# Patient Record
Sex: FEMALE | Race: WHITE | NOT HISPANIC OR LATINO | Employment: UNEMPLOYED | URBAN - METROPOLITAN AREA
[De-identification: names, ages, dates, MRNs, and addresses within clinical notes are randomized per-mention and may not be internally consistent; named-entity substitution may affect disease eponyms.]

---

## 2017-01-24 ENCOUNTER — OFFICE VISIT (OUTPATIENT)
Dept: FAMILY MEDICINE | Facility: OTHER | Age: 27
End: 2017-01-24
Payer: COMMERCIAL

## 2017-01-24 VITALS
TEMPERATURE: 98.2 F | BODY MASS INDEX: 21.09 KG/M2 | DIASTOLIC BLOOD PRESSURE: 72 MMHG | RESPIRATION RATE: 12 BRPM | WEIGHT: 119 LBS | SYSTOLIC BLOOD PRESSURE: 112 MMHG | HEIGHT: 63 IN | HEART RATE: 60 BPM

## 2017-01-24 DIAGNOSIS — N30.01 ACUTE CYSTITIS WITH HEMATURIA: Primary | ICD-10-CM

## 2017-01-24 LAB
ALBUMIN UR-MCNC: NEGATIVE MG/DL
APPEARANCE UR: CLEAR
BACTERIA #/AREA URNS HPF: ABNORMAL /HPF
BILIRUB UR QL STRIP: NEGATIVE
COLOR UR AUTO: YELLOW
GLUCOSE UR STRIP-MCNC: NEGATIVE MG/DL
HGB UR QL STRIP: ABNORMAL
KETONES UR STRIP-MCNC: NEGATIVE MG/DL
LEUKOCYTE ESTERASE UR QL STRIP: NEGATIVE
MUCOUS THREADS #/AREA URNS LPF: PRESENT /LPF
NITRATE UR QL: NEGATIVE
NON-SQ EPI CELLS #/AREA URNS LPF: ABNORMAL /LPF
PH UR STRIP: 5.5 PH (ref 5–7)
RBC #/AREA URNS AUTO: ABNORMAL /HPF (ref 0–2)
SP GR UR STRIP: 1.02 (ref 1–1.03)
URN SPEC COLLECT METH UR: ABNORMAL
UROBILINOGEN UR STRIP-ACNC: 0.2 EU/DL (ref 0.2–1)
WBC #/AREA URNS AUTO: ABNORMAL /HPF (ref 0–2)

## 2017-01-24 PROCEDURE — 81001 URINALYSIS AUTO W/SCOPE: CPT | Performed by: NURSE PRACTITIONER

## 2017-01-24 PROCEDURE — 99213 OFFICE O/P EST LOW 20 MIN: CPT | Performed by: NURSE PRACTITIONER

## 2017-01-24 RX ORDER — NITROFURANTOIN 25; 75 MG/1; MG/1
100 CAPSULE ORAL 2 TIMES DAILY
Qty: 14 CAPSULE | Refills: 0 | Status: SHIPPED | OUTPATIENT
Start: 2017-01-24 | End: 2018-02-28

## 2017-01-24 NOTE — MR AVS SNAPSHOT
"              After Visit Summary   2017    Lizzie Oconnell    MRN: [id number]           Patient Information     Date Of Birth          1990        Visit Information        Provider Department      2017 2:40 PM Belle Correia APRN CNP [facility]        Today's Diagnoses     Burning with urination    -  1     Acute cystitis with hematuria            Follow-ups after your visit        Who to contact     If you have questions or need follow up information about today's clinic visit or your schedule please contact [facility] directly at [phone].  Normal or non-critical lab and imaging results will be communicated to you by Etubicshart, letter or phone within 4 business days after the clinic has received the results. If you do not hear from us within 7 days, please contact the clinic through Etubicshart or phone. If you have a critical or abnormal lab result, we will notify you by phone as soon as possible.  Submit refill requests through PolyRemedy or call your pharmacy and they will forward the refill request to us. Please allow 3 business days for your refill to be completed.          Additional Information About Your Visit        MyChart Information     PolyRemedy lets you send messages to your doctor, view your test results, renew your prescriptions, schedule appointments and more. To sign up, go to [email or web address] . Click on \"Log in\" on the left side of the screen, which will take you to the Welcome page. Then click on \"Sign up Now\" on the right side of the page.     You will be asked to enter the access code listed below, as well as some personal information. Please follow the directions to create your username and password.     Your access code is: [email or web address]  Expires: 2017  2:57 PM     Your access code will  in 90 days. If you need help or a new code, please call your [facility] or [phone].        Care EveryWhere ID     This is your " "Care EveryWhere ID. This could be used by other organizations to access your [facility] medical records  [id number]        Your Vitals Were     Pulse Temperature Respirations Height BMI (Body Mass Index) Last Period    60 98.2  F (36.8  C) (Temporal) 12 5' 2.6\" (1.59 m) 21.35 kg/m2 01/18/2017    Breastfeeding?                   No            Blood Pressure from Last 3 Encounters:   01/24/17 112/72   03/14/15 110/72   03/13/15 111/75    Weight from Last 3 Encounters:   01/24/17 119 lb (53.978 kg)   03/14/15 111 lb 4.8 oz (50.485 kg)   03/13/15 109 lb (49.442 kg)              We Performed the Following     UA with Microscopic reflex to Culture          Today's Medication Changes          These changes are accurate as of: 1/24/17  2:57 PM.  If you have any questions, ask your nurse or doctor.               Start taking these medicines.        Dose/Directions    nitrofurantoin (macrocrystal-monohydrate) 100 MG capsule   Commonly known as:  MACROBID   Used for:  Acute cystitis with hematuria   Started by:  Belle Correia APRN CNP        Dose:  100 mg   Take 1 capsule (100 mg) by mouth 2 times daily   Quantity:  14 capsule   Refills:  0            Where to get your medicines      Some of these will need a paper prescription and others can be bought over the counter.  Ask your nurse if you have questions.     Bring a paper prescription for each of these medications    - nitrofurantoin (macrocrystal-monohydrate) 100 MG capsule             Primary Care Provider    None       No address on file        Thank you!     Thank you for choosing [facility]  for your care. Our goal is always to provide you with excellent care. Hearing back from our patients is one way we can continue to improve our services. Please take a few minutes to complete the written survey that you may receive in the mail after your visit with us. Thank you!             Your Updated Medication List - Protect others around you: " Learn how to safely use, store and throw away your medicines at www.disposemymeds.org.          This list is accurate as of: 1/24/17  2:57 PM.  Always use your most recent med list.                   Brand Name Dispense Instructions for use    AZO TABS PO          nitrofurantoin (macrocrystal-monohydrate) 100 MG capsule    MACROBID    14 capsule    Take 1 capsule (100 mg) by mouth 2 times daily

## 2017-01-24 NOTE — PROGRESS NOTES
Quick Note:    Results discussed with patient in clinic. States understanding of these results.    Belle Correia CNP  ______

## 2017-01-24 NOTE — PROGRESS NOTES
"  SUBJECTIVE:                                                    Lizzie Oconnell is a 27 year old female who presents to clinic today for the following health issues:    HPI    URINARY TRACT SYMPTOMS   Onset: 5 days   Description:   Painful urination (Dysuria): YES  Blood in urine (Hematuria): no   Delay in urine (Hesitency): YES  Intensity: moderate  Progression of Symptoms:  same  Accompanying Signs & Symptoms:  Fever/chills: no   Flank pain YES  Nausea and vomiting: no   Any vaginal symptoms: none  Abdominal/Pelvic Pain: YES History:   History of frequent UTI's: YES  History of kidney stones: no   Sexually Active: YES  Possibility of pregnancy: No  Precipitating factors:          Therapies Tried and outcome: AZO and Pyridium         Problem list and histories reviewed & adjusted, as indicated.  Additional history: as documented        Labs reviewed in EPIC  Problem list, Medication list, Allergies, and Medical/Social/Surgical histories reviewed in [facility] and updated as appropriate.    ROS:  Constitutional, HEENT, cardiovascular, pulmonary, GI, , musculoskeletal, neuro, skin, endocrine and psych systems are negative, except as otherwise noted.    OBJECTIVE:                                                    /72 mmHg  Pulse 60  Temp(Src) 98.2  F (36.8  C) (Temporal)  Resp 12  Ht 5' 2.6\" (1.59 m)  Wt 119 lb (53.978 kg)  BMI 21.35 kg/m2  LMP 01/18/2017  Breastfeeding? No  Body mass index is 21.35 kg/(m^2).  GENERAL: healthy, alert and no distress  EYES: Eyes grossly normal to inspection, PERRL and conjunctivae and sclerae normal  NECK: no adenopathy, no asymmetry, masses, or scars and thyroid normal to palpation  RESP: lungs clear to auscultation - no rales, rhonchi or wheezes  CV: regular rate and rhythm, normal S1 S2, no S3 or S4, no murmur, click or rub, no peripheral edema and peripheral pulses strong  ABDOMEN: soft, nontender, no hepatosplenomegaly, no masses and bowel sounds normal  MS: no gross " musculoskeletal defects noted, no edema  BACK: no CVA tenderness, no paralumbar tenderness    Diagnostic Test Results:  Results for orders placed or performed in visit on 01/24/17 (from the past 24 hour(s))   UA with Microscopic reflex to Culture   Result Value Ref Range    Color Urine Yellow     Appearance Urine Clear     Glucose Urine Negative NEG mg/dL    Bilirubin Urine Negative NEG    Ketones Urine Negative NEG mg/dL    Specific Gravity Urine 1.020 1.003 - 1.035    pH Urine 5.5 5.0 - 7.0 pH    Protein Albumin Urine Negative NEG mg/dL    Urobilinogen Urine 0.2 0.2 - 1.0 EU/dL    Nitrite Urine Negative NEG    Blood Urine Trace (A) NEG    Leukocyte Esterase Urine Negative NEG    Source Unspecified Urine     WBC Urine O - 2 0 - 2 /HPF    RBC Urine O - 2 0 - 2 /HPF    Squamous Epithelial /LPF Urine Few FEW /LPF    Bacteria Urine Few (A) NEG /HPF    Mucous Urine Present (A) NEG /LPF        ASSESSMENT/PLAN:                                                      1. Burning with urination  Negative urine for bacterial infection.  - UA with Microscopic reflex to Culture    2. Acute cystitis with hematuria  Printed script for antibiotic given to patient she will start if symptoms worsen or do not improve with increased fluids over the next few days.   - nitrofurantoin, macrocrystal-monohydrate, (MACROBID) 100 MG capsule; Take 1 capsule (100 mg) by mouth 2 times daily  Dispense: 14 capsule; Refill: 0    See Patient Instructions    [place] Alcantara [facility]

## 2017-01-24 NOTE — NURSING NOTE
"Chief Complaint   Patient presents with     UTI       Initial /72 mmHg  Pulse 60  Temp(Src) 98.2  F (36.8  C) (Temporal)  Resp 12  Ht 5' 2.6\" (1.59 m)  Wt 119 lb (53.978 kg)  BMI 21.35 kg/m2  LMP 01/18/2017  Breastfeeding? No Estimated body mass index is 21.35 kg/(m^2) as calculated from the following:    Height as of this encounter: 5' 2.6\" (1.59 m).    Weight as of this encounter: 119 lb (53.978 kg).  BP completed using cuff size: balwinder Holbrook CMA    "

## 2018-02-24 ENCOUNTER — HEALTH MAINTENANCE LETTER (OUTPATIENT)
Age: 28
End: 2018-02-24

## 2018-02-27 NOTE — PROGRESS NOTES
"  SUBJECTIVE:   Lizzie Oconnell is a 28 year old female who presents to clinic today for the following health issues:    History of Present Illness     Depression & Anxiety Follow-up:     Depression/Anxiety:  Anxiety only    Status since last visit::  Stable    Other associated symptoms of anxiety::  YES    Significant life event::  No    Current substance use::  None    Depression symptoms::  None       Today's PHQ-9         PHQ-9 Total Score:     (P) 10   PHQ-9 Q9 Suicidal ideation:   (P) Several days   Thoughts of suicide or self harm:      Self-harm Plan:        Self-harm Action:          Safety concerns for self or others:       TYREL-7 Total Score: (P) 21    TYREL-7 SCORE 2/28/2018   Total Score 21 (severe anxiety)   Total Score 21       PHQ-9 SCORE 2/28/2018   Total Score MyChart 10 (Moderate depression)   Total Score 10       Abnormal Mood Symptoms  Onset: 4 years ago when first son was born, than came on more hard after second was born 1.5 years ago    Description:   Depression: no  Anxiety: YES    Accompanying Signs & Symptoms:  Still participating in activities that you used to enjoy: YES  Fatigue: no  Irritability: YES  Difficulty concentrating: YES  Changes in appetite: YES- \"very hard to eat\"  Problems with sleep: YES- laying awake, waking up frequently  Heart racing/beating fast : YES  Thoughts of hurting yourself or others: yes in past November     History:   Recent stress: YES- son born premature by 6 weeks a year and a half ago, and moved about a year ago  Prior depression hospitalization: None  Family history of depression: YES - moms side  Family history of anxiety: YES - moms side     Precipitating factors:   Alcohol/drug use: no    Alleviating factors:  None    Therapies Tried and outcome: None    Problem list and histories reviewed & adjusted, as indicated.  Additional history: as documented        No current outpatient prescriptions on file.     BP Readings from Last 3 Encounters:   02/28/18 " "128/66   01/24/17 112/72   03/14/15 110/72    Wt Readings from Last 3 Encounters:   02/28/18 106 lb 12.8 oz (48.4 kg)   01/24/17 119 lb (54 kg)   03/14/15 111 lb 4.8 oz (50.5 kg)                 ROS:  CONSTITUTIONAL: NEGATIVE for fever, chills, change in weight  RESP: NEGATIVE for significant cough or SOB  CV: NEGATIVE for chest pain, palpitations or peripheral edema  ENDOCRINE: NEGATIVE for temperature intolerance, skin/hair changes and Hx thyroid disease    OBJECTIVE:     /66  Pulse 84  Temp 98.6  F (37  C) (Temporal)  Resp 12  Ht 5' 2\" (1.575 m)  Wt 106 lb 12.8 oz (48.4 kg)  SpO2 100%  BMI 19.53 kg/m2  Body mass index is 19.53 kg/(m^2).    GENERAL: healthy, alert and no distress  NEURO: Normal strength and tone, mentation intact and speech normal  PSYCH: mentation appears normal, affect normal/bright    Diagnostic Test Results:  Results for orders placed or performed in visit on 02/28/18   Vitamin D Deficiency   Result Value Ref Range    Vitamin D Deficiency screening 44 20 - 75 ug/L   TSH with free T4 reflex   Result Value Ref Range    TSH 2.00 0.40 - 4.00 mU/L       ASSESSMENT/PLAN:       ICD-10-CM    1. TYREL (generalized anxiety disorder) F41.1 Vitamin D Deficiency     TSH with free T4 reflex     MENTAL HEALTH REFERRAL  - Adult; Outpatient Treatment; Individual/Couples/Family/Group Therapy/Health Psychology; [facility] [phone]; We will contact you to schedule the appointment or please call with any questions     hydrOXYzine (ATARAX) 25 MG tablet   2. Mild major depression (H) F32.0 Vitamin D Deficiency     TSH with free T4 reflex     MENTAL HEALTH REFERRAL  - Adult; Outpatient Treatment; Individual/Couples/Family/Group Therapy/Health Psychology; [facility] [phone]; We will contact you to schedule the appointment or please call with any questions     hydrOXYzine (ATARAX) 25 MG tablet   3. Anxiety attack F41.0 hydrOXYzine (ATARAX) 25 MG tablet "     Discussed Depression and anxiety treatment. At this time patient would like to hold off on starting antidepressant. She would like atarax as needed for panic attacks. I educated her on Zoloft including the impacts on mood instabilities. We discussed counseling, which she is interested in. Will check Vitamin D and Thyroid levels. Discussed Atarax side effects and medication.     Follow up in 1 month.     The patient indicates understanding of these issues and agrees with the plan.      [place] Coker [facility]

## 2018-02-28 ENCOUNTER — OFFICE VISIT (OUTPATIENT)
Dept: FAMILY MEDICINE | Facility: OTHER | Age: 28
End: 2018-02-28
Payer: COMMERCIAL

## 2018-02-28 VITALS
HEIGHT: 62 IN | WEIGHT: 106.8 LBS | BODY MASS INDEX: 19.65 KG/M2 | HEART RATE: 84 BPM | OXYGEN SATURATION: 100 % | TEMPERATURE: 98.6 F | DIASTOLIC BLOOD PRESSURE: 66 MMHG | RESPIRATION RATE: 12 BRPM | SYSTOLIC BLOOD PRESSURE: 128 MMHG

## 2018-02-28 DIAGNOSIS — F41.0 ANXIETY ATTACK: ICD-10-CM

## 2018-02-28 DIAGNOSIS — F41.1 GAD (GENERALIZED ANXIETY DISORDER): Primary | ICD-10-CM

## 2018-02-28 DIAGNOSIS — F32.0 MILD MAJOR DEPRESSION (H): ICD-10-CM

## 2018-02-28 LAB — TSH SERPL DL<=0.005 MIU/L-ACNC: 2 MU/L (ref 0.4–4)

## 2018-02-28 PROCEDURE — 99213 OFFICE O/P EST LOW 20 MIN: CPT | Performed by: NURSE PRACTITIONER

## 2018-02-28 PROCEDURE — 36415 COLL VENOUS BLD VENIPUNCTURE: CPT | Performed by: NURSE PRACTITIONER

## 2018-02-28 PROCEDURE — 82306 VITAMIN D 25 HYDROXY: CPT | Performed by: NURSE PRACTITIONER

## 2018-02-28 PROCEDURE — 84443 ASSAY THYROID STIM HORMONE: CPT | Performed by: NURSE PRACTITIONER

## 2018-02-28 RX ORDER — HYDROXYZINE HYDROCHLORIDE 25 MG/1
25-50 TABLET, FILM COATED ORAL EVERY 6 HOURS PRN
Qty: 60 TABLET | Refills: 1 | Status: SHIPPED | OUTPATIENT
Start: 2018-02-28 | End: 2018-03-08

## 2018-02-28 ASSESSMENT — ANXIETY QUESTIONNAIRES
1. FEELING NERVOUS, ANXIOUS, OR ON EDGE: NEARLY EVERY DAY
4. TROUBLE RELAXING: NEARLY EVERY DAY
GAD7 TOTAL SCORE: 21
3. WORRYING TOO MUCH ABOUT DIFFERENT THINGS: NEARLY EVERY DAY
GAD7 TOTAL SCORE: 21
7. FEELING AFRAID AS IF SOMETHING AWFUL MIGHT HAPPEN: NEARLY EVERY DAY
GAD7 TOTAL SCORE: 21
7. FEELING AFRAID AS IF SOMETHING AWFUL MIGHT HAPPEN: NEARLY EVERY DAY
6. BECOMING EASILY ANNOYED OR IRRITABLE: NEARLY EVERY DAY
2. NOT BEING ABLE TO STOP OR CONTROL WORRYING: NEARLY EVERY DAY
5. BEING SO RESTLESS THAT IT IS HARD TO SIT STILL: NEARLY EVERY DAY

## 2018-02-28 ASSESSMENT — PATIENT HEALTH QUESTIONNAIRE - PHQ9
SUM OF ALL RESPONSES TO PHQ QUESTIONS 1-9: 10
SUM OF ALL RESPONSES TO PHQ QUESTIONS 1-9: 10
10. IF YOU CHECKED OFF ANY PROBLEMS, HOW DIFFICULT HAVE THESE PROBLEMS MADE IT FOR YOU TO DO YOUR WORK, TAKE CARE OF THINGS AT HOME, OR GET ALONG WITH OTHER PEOPLE: VERY DIFFICULT

## 2018-02-28 ASSESSMENT — PAIN SCALES - GENERAL: PAINLEVEL: NO PAIN (0)

## 2018-02-28 NOTE — NURSING NOTE
"Chief Complaint   Patient presents with     Anxiety     Depression     Panel Management     mychart, height, tdap, flu, pap       Initial /66  Pulse 84  Temp 98.6  F (37  C) (Temporal)  Resp 12  Ht 5' 2\" (1.575 m)  Wt 106 lb 12.8 oz (48.4 kg)  SpO2 100%  BMI 19.53 kg/m2 Estimated body mass index is 19.53 kg/(m^2) as calculated from the following:    Height as of this encounter: 5' 2\" (1.575 m).    Weight as of this encounter: 106 lb 12.8 oz (48.4 kg).  Medication Reconciliation: complete  "

## 2018-02-28 NOTE — MR AVS SNAPSHOT
After Visit Summary   2/28/2018    Lizzie Oconnell    MRN: [id number]           Patient Information     Date Of Birth          1990        Visit Information        Provider Department      2/28/2018 4:00 PM Charlene Springer APRN CNP [facility]        Today's Diagnoses     TYREL (generalized anxiety disorder)    -  1    Mild major depression (H)        Anxiety attack           Follow-ups after your visit        Additional Services     MENTAL HEALTH REFERRAL  - Adult; Outpatient Treatment; Individual/Couples/Family/Group Therapy/Health Psychology; [facility]: [facility] [phone]; We will contact you to schedule the appointment or please call with any questions       All scheduling is subject to the client's specific insurance plan & benefits, provider/location availability, and provider clinical specialities.  Please arrive 15 minutes early for your first appointment and bring your completed paperwork.    Please be aware that coverage of these services is subject to the terms and limitations of your health insurance plan.  Call member services at your health plan with any benefit or coverage questions.                            Who to contact     If you have questions or need follow up information about today's clinic visit or your schedule please contact [facility] directly at [phone].  Normal or non-critical lab and imaging results will be communicated to you by MyChart, letter or phone within 4 business days after the clinic has received the results. If you do not hear from us within 7 days, please contact the clinic through MyChart or phone. If you have a critical or abnormal lab result, we will notify you by phone as soon as possible.  Submit refill requests through CompareAwayt or call your pharmacy and they will forward the refill request to us. Please allow 3 business days for your refill to be completed.          Additional Information About  "Your Visit        Accipiter RadarharKlangoo Information     Milk lets you send messages to your doctor, view your test results, renew your prescriptions, schedule appointments and more. To sign up, go to [email or web address] . Click on \"Log in\" on the left side of the screen, which will take you to the Welcome page. Then click on \"Sign up Now\" on the right side of the page.     You will be asked to enter the access code listed below, as well as some personal information. Please follow the directions to create your username and password.     Your access code is: [email or web address]  Expires: 2018  4:37 PM     Your access code will  in 90 days. If you need help or a new code, please call your [place] clinic or [phone].        Care EveryWhere ID     This is your Care EveryWhere ID. This could be used by other organizations to access your [place] medical records  [id number]        Your Vitals Were     Pulse Temperature Respirations Height Pulse Oximetry BMI (Body Mass Index)    84 98.6  F (37  C) (Temporal) 12 5' 2\" (1.575 m) 100% 19.53 kg/m2       Blood Pressure from Last 3 Encounters:   18 128/66   17 112/72   03/14/15 110/72    Weight from Last 3 Encounters:   18 106 lb 12.8 oz (48.4 kg)   17 119 lb (54 kg)   03/14/15 111 lb 4.8 oz (50.5 kg)              We Performed the Following     MENTAL HEALTH REFERRAL  - Adult; Outpatient Treatment; Individual/Couples/Family/Group Therapy/Health Psychology; [facility]: [facility] [phone]; We will contact you to schedule the appointment or please call with any questions     TSH with free T4 reflex     Vitamin D Deficiency          Today's Medication Changes          These changes are accurate as of 18  4:37 PM.  If you have any questions, ask your nurse or doctor.               Start taking these medicines.        Dose/Directions    hydrOXYzine 25 MG tablet   Commonly known as:  ATARAX   Used for:  TYREL (generalized anxiety " disorder), Mild major depression (H), Anxiety attack   Started by:  Charlene Springer APRN CNP        Dose:  25-50 mg   Take 1-2 tablets (25-50 mg) by mouth every 6 hours as needed for anxiety Do not take while driving or operating machinery.   Quantity:  60 tablet   Refills:  1            Where to get your medicines      These medications were sent to [facility] [address] [facility] - [place], [place] - [address]  [address], [facility] [address]     Phone:  [phone]     hydrOXYzine 25 MG tablet                Primary Care Provider Office Phone # Fax #    Nupur Connolly [phone]       [address] [facility] [address]  [employer] [address]        Equal Access to Services     [facility]byterian[facility] : Hadii isabella hortono Sodavid, waaxda luqadaha, qaybta kaalmada adeegyada, amina jain . So [facility] [phone].    ATENCIÓN: Si habla español, tiene a schafer disposición servicios gratuitos de asistencia lingüística. [facility] [phone].    We comply with applicable federal civil rights laws and [place] laws. We do not discriminate on the basis of race, color, national origin, age, disability, sex, sexual orientation, or gender identity.            Thank you!     Thank you for choosing [facility]  for your care. Our goal is always to provide you with excellent care. Hearing back from our patients is one way we can continue to improve our services. Please take a few minutes to complete the written survey that you may receive in the mail after your visit with us. Thank you!             Your Updated Medication List - Protect others around you: Learn how to safely use, store and throw away your medicines at www.disposemymeds.org.          This list is accurate as of 2/28/18  4:37 PM.  Always use your most recent med list.                   Brand Name Dispense Instructions for use Diagnosis    hydrOXYzine 25 MG tablet    ATARAX    60 tablet    Take 1-2 tablets (25-50 mg) by mouth every  6 hours as needed for anxiety Do not take while driving or operating machinery.    TYREL (generalized anxiety disorder), Mild major depression (H), Anxiety attack

## 2018-03-01 ENCOUNTER — TELEPHONE (OUTPATIENT)
Dept: FAMILY MEDICINE | Facility: OTHER | Age: 28
End: 2018-03-01

## 2018-03-01 LAB — DEPRECATED CALCIDIOL+CALCIFEROL SERPL-MC: 44 UG/L (ref 20–75)

## 2018-03-01 ASSESSMENT — ANXIETY QUESTIONNAIRES: GAD7 TOTAL SCORE: 21

## 2018-03-01 ASSESSMENT — PATIENT HEALTH QUESTIONNAIRE - PHQ9: SUM OF ALL RESPONSES TO PHQ QUESTIONS 1-9: 10

## 2018-03-01 NOTE — TELEPHONE ENCOUNTER
----- Message from [place] Hannah CNP sent at 3/1/2018  2:29 PM CST -----  Please let patient know her Vitamin D and Thyroid are normal. Continue OTC vitamin D daily. Follow up with me in clinic as discussed at OV.     [place] Coker CNP

## 2018-03-06 ENCOUNTER — OFFICE VISIT (OUTPATIENT)
Dept: FAMILY MEDICINE | Facility: OTHER | Age: 28
End: 2018-03-06
Payer: COMMERCIAL

## 2018-03-06 VITALS
RESPIRATION RATE: 16 BRPM | BODY MASS INDEX: 19.51 KG/M2 | TEMPERATURE: 97.7 F | HEART RATE: 86 BPM | SYSTOLIC BLOOD PRESSURE: 104 MMHG | DIASTOLIC BLOOD PRESSURE: 66 MMHG | HEIGHT: 62 IN | OXYGEN SATURATION: 100 % | WEIGHT: 106 LBS

## 2018-03-06 DIAGNOSIS — F41.9 ANXIETY: Primary | ICD-10-CM

## 2018-03-06 PROCEDURE — 99213 OFFICE O/P EST LOW 20 MIN: CPT | Performed by: FAMILY MEDICINE

## 2018-03-06 ASSESSMENT — PAIN SCALES - GENERAL: PAINLEVEL: NO PAIN (0)

## 2018-03-06 NOTE — MR AVS SNAPSHOT
After Visit Summary   3/6/2018    Lizzie Oconnell    MRN: [id number]           Patient Information     Date Of Birth          1990        Visit Information        Provider Department      3/6/2018 3:40 PM Nupur Connolly MD [facility]        Today's Diagnoses     Anxiety    -  1       Follow-ups after your visit        Your next 10 appointments already scheduled     Mar 09, 2018  3:40 PM CST   Office Visit with DENNYS Hannah CNP   [facility] ([facility]    [address]  [facility] [address]   [phone]           Bring a current list of meds and any records pertaining to this visit. For Physicals, please bring immunization records and any forms needing to be filled out. Please arrive 10 minutes early to complete paperwork.            Mar 21, 2018  4:00 PM CDT   Office Visit with DENNYS Hannah CNP   [facility] ([facility]    [address]  [facility] [id number] [phone]           Bring a current list of meds and any records pertaining to this visit. For Physicals, please bring immunization records and any forms needing to be filled out. Please arrive 10 minutes early to complete paperwork.            Apr 12, 2018  2:00 PM CDT   (Arrive by 1:30 PM)   New Visit with Shannon Rivera   [facility] ([facility])    [address]  [facility] [address] [phone]            Apr 19, 2018  4:00 PM CDT   Return Visit with Shannon Rivera   [facility] ([facility])    [address]  [facility] [address] [phone]              Who to contact     If you have questions or need follow up information about today's clinic visit or your schedule please contact [facility] directly at [phone].  Normal or non-critical lab and imaging results will be communicated to  "you by MyChart, letter or phone within 4 business days after the clinic has received the results. If you do not hear from us within 7 days, please contact the clinic through InnerWirelesst or phone. If you have a critical or abnormal lab result, we will notify you by phone as soon as possible.  Submit refill requests through Spotjournal or call your pharmacy and they will forward the refill request to us. Please allow 3 business days for your refill to be completed.          Additional Information About Your Visit        Digital LabharRoot4 Information     Spotjournal lets you send messages to your doctor, view your test results, renew your prescriptions, schedule appointments and more. To sign up, go to [email or web address] . Click on \"Log in\" on the left side of the screen, which will take you to the Welcome page. Then click on \"Sign up Now\" on the right side of the page.     You will be asked to enter the access code listed below, as well as some personal information. Please follow the directions to create your username and password.     Your access code is: [email or web address]  Expires: 2018  4:37 PM     Your access code will  in 90 days. If you need help or a new code, please call your [place] clinic or [phone].        Care EveryWhere ID     This is your Care EveryWhere ID. This could be used by other organizations to access your [place] medical records  [id number]        Your Vitals Were     Pulse Temperature Respirations Height Pulse Oximetry BMI (Body Mass Index)    86 97.7  F (36.5  C) (Oral) 16 5' 2\" (1.575 m) 100% 19.39 kg/m2       Blood Pressure from Last 3 Encounters:   18 110/70   18 104/66   18 104/66    Weight from Last 3 Encounters:   18 107 lb (48.5 kg)   18 106 lb (48.1 kg)   18 106 lb (48.1 kg)              Today, you had the following     No orders found for display       Primary Care Provider Office Phone # Fax #    Nupur Connolly [phone]       " [address]  [facility] [address]        Equal Access to Services     LORE REES : Hadii isabella Flores, arnol pulido, louann lundy, amina finnegan. So [facility] [phone].    ATENCIÓN: Si habla español, tiene a schafer disposición servicios gratuitos de asistencia lingüística. Llame al [phone].    We comply with applicable federal civil rights laws and [place] laws. We do not discriminate on the basis of race, color, national origin, age, disability, sex, sexual orientation, or gender identity.            Thank you!     Thank you for choosing [facility]  for your care. Our goal is always to provide you with excellent care. Hearing back from our patients is one way we can continue to improve our services. Please take a few minutes to complete the written survey that you may receive in the mail after your visit with us. Thank you!             Your Updated Medication List - Protect others around you: Learn how to safely use, store and throw away your medicines at www.disposemymeds.org.      Notice  As of 3/6/2018 11:59 PM    You have not been prescribed any medications.

## 2018-03-06 NOTE — PROGRESS NOTES
SUBJECTIVE:   Lizzie Oconnell is a 28 year old female who presents to clinic today for the following health issues:      HPI  Depression and Anxiety Follow-Up    Status since last visit: No change    Other associated symptoms:None    Complicating factors:     Significant life event: No     Current substance abuse: None    Patient stated she has taken the medication 3 times so far.  Does not kick in for 45 minutes and once it does it wipes patient out.  Feels really groggy the rest of the day and the next morning feels like she is hung over.    PHQ-9 2/28/2018 3/6/2018   Total Score 10 13   Q9: Suicide Ideation Several days Not at all     TYREL-7 SCORE 2/28/2018   Total Score 21 (severe anxiety)   Total Score 21     .  PHQ-9  English  PHQ-9   Any Language  TYREL-7  Suicide Assessment Five-step Evaluation and Treatment (SAFE-T)  Problem list and histories reviewed & adjusted, as indicated.  Additional history: as documented        Patient Active Problem List   Diagnosis     Normal labor     Pain in joint, multiple sites     Postoperative pain     Delivery normal     Vaginal bleeding     Vaginal discharge     TYREL (generalized anxiety disorder)     Mild major depression (H)     Panic attack     Past Surgical History:   Procedure Laterality Date     GALLBLADDER SURGERY         Social History   Substance Use Topics     Smoking status: Never Smoker     Smokeless tobacco: Never Used     Alcohol use No     Family History   Problem Relation Age of Onset     Anxiety Disorder Mother      Depression Mother      Anxiety Disorder Brother      Depression Brother          Current Outpatient Prescriptions   Medication Sig Dispense Refill     DULoxetine (CYMBALTA) 20 MG EC capsule Take 1 capsule (20 mg) by mouth daily 60 capsule 0     ALPRAZolam (XANAX) 0.25 MG tablet Take 1 tablet (0.25 mg) by mouth 3 times daily as needed for anxiety 10 tablet 0     Allergies   Allergen Reactions     Aminothiols      Amoxicillin      Penicillins   "    BP Readings from Last 3 Encounters:   03/07/18 110/70   03/06/18 104/66   03/06/18 104/66    Wt Readings from Last 3 Encounters:   03/07/18 107 lb (48.5 kg)   03/06/18 106 lb (48.1 kg)   03/06/18 106 lb (48.1 kg)                  Labs reviewed in EPIC    ROS:  Constitutional, HEENT, cardiovascular, pulmonary, gi and gu systems are negative, except as otherwise noted.    OBJECTIVE:     /66 (BP Location: Left arm, Patient Position: Chair, Cuff Size: Adult Small)  Pulse 86  Temp 97.7  F (36.5  C) (Oral)  Resp 16  Ht 5' 2\" (1.575 m)  Wt 106 lb (48.1 kg)  SpO2 100%  BMI 19.39 kg/m2  Body mass index is 19.39 kg/(m^2).   Physical Exam   Constitutional: She appears well-developed and well-nourished.   Psychiatric: Her behavior is normal. Judgment and thought content normal.   Anxious appearing         Diagnostic Test Results:  none     ASSESSMENT/PLAN:     Problem List Items Addressed This Visit     None      pt was quite upset that she was not seeing Charlene miller today. I did discuss about rescheduling vs talking. Initially she appeared to open up and discuss why she was here and once I laid out the plan and declined doing xanax, she said she would rather discuss with Charlene Miller and left  Nupur Connolly MD  [facility]"

## 2018-03-06 NOTE — NURSING NOTE
"Chief Complaint   Patient presents with     Anxiety     Depression     Health Maintenance       Initial /66 (BP Location: Left arm, Patient Position: Chair, Cuff Size: Adult Small)  Pulse 86  Temp 97.7  F (36.5  C) (Oral)  Resp 16  Ht 5' 2\" (1.575 m)  Wt 106 lb (48.1 kg)  SpO2 100%  BMI 19.39 kg/m2 Estimated body mass index is 19.39 kg/(m^2) as calculated from the following:    Height as of this encounter: 5' 2\" (1.575 m).    Weight as of this encounter: 106 lb (48.1 kg).  Medication Reconciliation: complete   Dona Blanco CMA (AAMA)      "

## 2018-03-07 ENCOUNTER — OFFICE VISIT (OUTPATIENT)
Dept: FAMILY MEDICINE | Facility: OTHER | Age: 28
End: 2018-03-07
Payer: COMMERCIAL

## 2018-03-07 VITALS
HEART RATE: 88 BPM | WEIGHT: 107 LBS | OXYGEN SATURATION: 100 % | SYSTOLIC BLOOD PRESSURE: 110 MMHG | DIASTOLIC BLOOD PRESSURE: 70 MMHG | TEMPERATURE: 97.7 F | BODY MASS INDEX: 19.57 KG/M2 | RESPIRATION RATE: 16 BRPM

## 2018-03-07 DIAGNOSIS — F41.0 PANIC ATTACK: ICD-10-CM

## 2018-03-07 DIAGNOSIS — F41.1 GAD (GENERALIZED ANXIETY DISORDER): Primary | ICD-10-CM

## 2018-03-07 PROCEDURE — 99214 OFFICE O/P EST MOD 30 MIN: CPT | Performed by: NURSE PRACTITIONER

## 2018-03-07 RX ORDER — ALPRAZOLAM 0.25 MG
0.25 TABLET ORAL 3 TIMES DAILY PRN
Qty: 10 TABLET | Refills: 0 | Status: SHIPPED | OUTPATIENT
Start: 2018-03-07 | End: 2018-05-08

## 2018-03-07 RX ORDER — DULOXETIN HYDROCHLORIDE 20 MG/1
20 CAPSULE, DELAYED RELEASE ORAL DAILY
Qty: 60 CAPSULE | Refills: 0 | Status: SHIPPED | OUTPATIENT
Start: 2018-03-07 | End: 2018-04-18

## 2018-03-07 ASSESSMENT — PATIENT HEALTH QUESTIONNAIRE - PHQ9
5. POOR APPETITE OR OVEREATING: NEARLY EVERY DAY
SUM OF ALL RESPONSES TO PHQ QUESTIONS 1-9: 13
SUM OF ALL RESPONSES TO PHQ QUESTIONS 1-9: 13
10. IF YOU CHECKED OFF ANY PROBLEMS, HOW DIFFICULT HAVE THESE PROBLEMS MADE IT FOR YOU TO DO YOUR WORK, TAKE CARE OF THINGS AT HOME, OR GET ALONG WITH OTHER PEOPLE: VERY DIFFICULT

## 2018-03-07 ASSESSMENT — ANXIETY QUESTIONNAIRES
3. WORRYING TOO MUCH ABOUT DIFFERENT THINGS: NEARLY EVERY DAY
5. BEING SO RESTLESS THAT IT IS HARD TO SIT STILL: NEARLY EVERY DAY
2. NOT BEING ABLE TO STOP OR CONTROL WORRYING: NEARLY EVERY DAY
7. FEELING AFRAID AS IF SOMETHING AWFUL MIGHT HAPPEN: MORE THAN HALF THE DAYS
1. FEELING NERVOUS, ANXIOUS, OR ON EDGE: NEARLY EVERY DAY
GAD7 TOTAL SCORE: 19
6. BECOMING EASILY ANNOYED OR IRRITABLE: MORE THAN HALF THE DAYS
IF YOU CHECKED OFF ANY PROBLEMS ON THIS QUESTIONNAIRE, HOW DIFFICULT HAVE THESE PROBLEMS MADE IT FOR YOU TO DO YOUR WORK, TAKE CARE OF THINGS AT HOME, OR GET ALONG WITH OTHER PEOPLE: VERY DIFFICULT

## 2018-03-07 NOTE — NURSING NOTE
"No chief complaint on file.      Initial /70 (BP Location: Left arm, Patient Position: Chair, Cuff Size: Adult Regular)  Pulse 88  Temp 97.7  F (36.5  C) (Oral)  Resp 16  Wt 107 lb (48.5 kg)  SpO2 100%  BMI 19.57 kg/m2 Estimated body mass index is 19.57 kg/(m^2) as calculated from the following:    Height as of 3/6/18: 5' 2\" (1.575 m).    Weight as of this encounter: 107 lb (48.5 kg).  Medication Reconciliation: complete    "

## 2018-03-07 NOTE — MR AVS SNAPSHOT
After Visit Summary   3/7/2018    Lizzie Oconnell    MRN: [id number]           Patient Information     Date Of Birth          1990        Visit Information        Provider Department      3/7/2018 4:00 PM Belle Correia APRN CNP [facility]        Today's Diagnoses     TYREL (generalized anxiety disorder)    -  1    Panic attack          Care Instructions    Please return to clinic in 6 weeks for follow up anxiety and new medication treatment plan.     Thank you  Belle Correia CNP            Follow-ups after your visit        Your next 10 appointments already scheduled     Mar 09, 2018  3:40 PM CST   Office Visit with DENNYS Hannah CNP   [facility] ([facility])    [address]  [facility] [address]   [phone]           Bring a current list of meds and any records pertaining to this visit. For Physicals, please bring immunization records and any forms needing to be filled out. Please arrive 10 minutes early to complete paperwork.            Mar 21, 2018  4:00 PM CDT   Office Visit with DENNYS Hannah CNP   [facility] ([facility])    [address]  [facility] [address]   [phone]           Bring a current list of meds and any records pertaining to this visit. For Physicals, please bring immunization records and any forms needing to be filled out. Please arrive 10 minutes early to complete paperwork.            Apr 12, 2018  2:00 PM CDT   (Arrive by 1:30 PM)   New Visit with Shannon Rivera   [facility] ([facility])    [address]  [facility] [address]   [phone]            Apr 19, 2018  4:00 PM CDT   [facility]    [address]  [facility] [address]   [phone]              Who to contact     If you have  "questions or need follow up information about today's clinic visit or your schedule please contact [facility] directly at [phone].  Normal or non-critical lab and imaging results will be communicated to you by MyChart, letter or phone within 4 business days after the clinic has received the results. If you do not hear from us within 7 days, please contact the clinic through MyChart or phone. If you have a critical or abnormal lab result, we will notify you by phone as soon as possible.  Submit refill requests through RunAlong or call your pharmacy and they will forward the refill request to us. Please allow 3 business days for your refill to be completed.          Additional Information About Your Visit        ROOOMERSharGini Information     RunAlong lets you send messages to your doctor, view your test results, renew your prescriptions, schedule appointments and more. To sign up, go to [email or web address] . Click on \"Log in\" on the left side of the screen, which will take you to the Welcome page. Then click on \"Sign up Now\" on the right side of the page.     You will be asked to enter the access code listed below, as well as some personal information. Please follow the directions to create your username and password.     Your access code is: [email or web address]  Expires: 2018  4:37 PM     Your access code will  in 90 days. If you need help or a new code, please call your [place] clinic or [phone].        Care EveryWhere ID     This is your Care EveryWhere ID. This could be used by other organizations to access your [place] medical records  [id number]        Your Vitals Were     Pulse Temperature Respirations Pulse Oximetry BMI (Body Mass Index)       88 97.7  F (36.5  C) (Oral) 16 100% 19.57 kg/m2        Blood Pressure from Last 3 Encounters:   18 110/70   18 104/66   18 104/66    Weight from Last 3 Encounters:   18 107 lb (48.5 kg)   18 106 lb (48.1 kg) "   03/06/18 106 lb (48.1 kg)              We Performed the Following     DEPRESSION ACTION PLAN (DAP)          Today's Medication Changes          These changes are accurate as of 3/7/18  4:50 PM.  If you have any questions, ask your nurse or doctor.               Start taking these medicines.        Dose/Directions    ALPRAZolam 0.25 MG tablet   Commonly known as:  XANAX   Used for:  Panic attack   Started by:  Belle Correia APRN CNP        Dose:  0.25 mg   Take 1 tablet (0.25 mg) by mouth 3 times daily as needed for anxiety   Quantity:  10 tablet   Refills:  0       DULoxetine 20 MG EC capsule   Commonly known as:  CYMBALTA   Used for:  TYREL (generalized anxiety disorder)   Started by:  Belle Correia APRN CNP        Dose:  20 mg   Take 1 capsule (20 mg) by mouth daily   Quantity:  60 capsule   Refills:  0            Where to get your medicines      These medications were sent to [place] [address] [place] [facility] [address] [address] [place] [address]  [address] [address] [place] [address]     Phone:  [phone]     DULoxetine 20 MG EC capsule         Some of these will need a paper prescription and others can be bought over the counter.  Ask your nurse if you have questions.     Bring a paper prescription for each of these medications     ALPRAZolam 0.25 MG tablet                Primary Care Provider Office Phone # Fax #    Nupur Connolly [phone]       [address] [facility] [address]  [facility] [address]        Equal Access to Services     [facility] HospitalRAJESH AH: Hadii isabella hortono Sodavid, waaxda luqadaha, qaybta kaalmada aderonnie, waxay felecia finnegan. So [facility] [phone].    ATENCIÓN: Si shyamla rachel, tiene a schafer disposición servicios gratuitos de asistencia lingüística. Llame al [phone].    We comply with applicable federal civil rights laws and [place] laws. We do not discriminate on the basis of race, color, national origin, age, disability, sex, sexual orientation, or  gender identity.            Thank you!     Thank you for choosing [facility]  for your care. Our goal is always to provide you with excellent care. Hearing back from our patients is one way we can continue to improve our services. Please take a few minutes to complete the written survey that you may receive in the mail after your visit with us. Thank you!             Your Updated Medication List - Protect others around you: Learn how to safely use, store and throw away your medicines at www.disposemymeds.org.          This list is accurate as of 3/7/18  4:50 PM.  Always use your most recent med list.                   Brand Name Dispense Instructions for use Diagnosis    ALPRAZolam 0.25 MG tablet    XANAX    10 tablet    Take 1 tablet (0.25 mg) by mouth 3 times daily as needed for anxiety    Panic attack       DULoxetine 20 MG EC capsule    CYMBALTA    60 capsule    Take 1 capsule (20 mg) by mouth daily    TYREL (generalized anxiety disorder)       hydrOXYzine 25 MG tablet    ATARAX    60 tablet    Take 1-2 tablets (25-50 mg) by mouth every 6 hours as needed for anxiety Do not take while driving or operating machinery.    TYREL (generalized anxiety disorder), Mild major depression (H), Anxiety attack

## 2018-03-07 NOTE — LETTER
My Depression Action Plan  Name: Lizzie Oconnell   Date of Birth 1990  Date: 3/7/2018    My doctor: Nupur Connolly   My clinic: [address]  [facility] [address] [phone]          GREEN    ZONE   Good Control    What it looks like:     Things are going generally well. You have normal up s and down s. You may even feel depressed from time to time, but bad moods usually last less than a day.   What you need to do:  1. Continue to care for yourself (see self care plan)  2. Check your depression survival kit and update it as needed  3. Follow your physician s recommendations including any medication.  4. Do not stop taking medication unless you consult with your physician first.           YELLOW         ZONE Getting Worse    What it looks like:     Depression is starting to interfere with your life.     It may be hard to get out of bed; you may be starting to isolate yourself from others.    Symptoms of depression are starting to last most all day and this has happened for several days.     You may have suicidal thoughts but they are not constant.   What you need to do:     1. Call your care team, your response to treatment will improve if you keep your care team informed of your progress. Yellow periods are signs an adjustment may need to be made.     2. Continue your self-care, even if you have to fake it!    3. Talk to someone in your support network    4. Open up your depression survival kit           RED    ZONE Medical Alert - Get Help    What it looks like:     Depression is seriously interfering with your life.     You may experience these or other symptoms: You can t get out of bed most days, can t work or engage in other necessary activities, you have trouble taking care of basic hygiene, or basic responsibilities, thoughts of suicide or death that will not go away, self-injurious behavior.     What you need to do:  1. Call your care team and  request a same-day appointment. If they are not available (weekends or after hours) call your local crisis line, emergency room or 911.      Electronically signed by: Henna Puente, March 7, 2018    Depression Self Care Plan / Survival Kit    Self-Care for Depression  Here s the deal. Your body and mind are really not as separate as most people think.  What you do and think affects how you feel and how you feel influences what you do and think. This means if you do things that people who feel good do, it will help you feel better.  Sometimes this is all it takes.  There is also a place for medication and therapy depending on how severe your depression is, so be sure to consult with your medical provider and/ or Behavioral Health Consultant if your symptoms are worsening or not improving.     In order to better manage my stress, I will:    Exercise  Get some form of exercise, every day. This will help reduce pain and release endorphins, the  feel good  chemicals in your brain. This is almost as good as taking antidepressants!  This is not the same as joining a gym and then never going! (they count on that by the way ) It can be as simple as just going for a walk or doing some gardening, anything that will get you moving.      Hygiene   Maintain good hygiene (Get out of bed in the morning, Make your bed, Brush your teeth, Take a shower, and Get dressed like you were going to work, even if you are unemployed).  If your clothes don't fit try to get ones that do.    Diet  I will strive to eat foods that are good for me, drink plenty of water, and avoid excessive sugar, caffeine, alcohol, and other mood-altering substances.  Some foods that are helpful in depression are: complex carbohydrates, B vitamins, flaxseed, fish or fish oil, fresh fruits and vegetables.    Psychotherapy  I agree to participate in Individual Therapy (if recommended).    Medication  If prescribed medications, I agree to take them.  Missing doses can  result in serious side effects.  I understand that drinking alcohol, or other illicit drug use, may cause potential side effects.  I will not stop my medication abruptly without first discussing it with my provider.    Staying Connected With Others  I will stay in touch with my friends, family members, and my primary care provider/team.    Use your imagination  Be creative.  We all have a creative side; it doesn t matter if it s oil painting, sand castles, or mud pies! This will also kick up the endorphins.    Witness Beauty  (AKA stop and smell the roses) Take a look outside, even in mid-winter. Notice colors, textures. Watch the squirrels and birds.     Service to others  Be of service to others.  There is always someone else in need.  By helping others we can  get out of ourselves  and remember the really important things.  This also provides opportunities for practicing all the other parts of the program.    Humor  Laugh and be silly!  Adjust your TV habits for less news and crime-drama and more comedy.    Control your stress  Try breathing deep, massage therapy, biofeedback, and meditation. Find time to relax each day.     My support system    Clinic Contact:  Phone number:    Contact 1:  Phone number:    Contact 2:  Phone number:    Jainism/:  Phone number:    Therapist:  Phone number:    Local crisis center:    Phone number:    Other community support:  Phone number:

## 2018-03-07 NOTE — PATIENT INSTRUCTIONS
Please return to clinic in 6 weeks for follow up anxiety and new medication treatment plan.     Thank you  Belle Correia CNP

## 2018-03-07 NOTE — PROGRESS NOTES
SUBJECTIVE:   Lizzie Oconnell is a 28 year old female who presents to clinic today for the following health issues:      HPI   Answers for HPI/ROS submitted by the patient on 3/7/2018   If you checked off any problems, how difficult have these problems made it for you to do your work, take care of things at home, or get along with other people?: Very difficult  PHQ9 TOTAL SCORE: 13      Depression and Anxiety Follow-Up    Status since last visit: Worsened, still trying to figure out a treatment. Prn med is not working out     Other associated symptoms: hard time eating because stomach is so upset from nerves, anxious throughout the day, racing thoughts     Complicating factors:     Significant life event: No     Current substance abuse: None    PHQ-9 2/28/2018 3/6/2018 3/7/2018   Total Score 10 13 13   Q9: Suicide Ideation Several days Not at all Not at all     TYREL-7 SCORE 2/28/2018   Total Score 21 (severe anxiety)   Total Score 21     Has an appointment with therapy in April to start therapy as she has not been seen by therapist in past.   Started Hydroxyzine and did not like the side effect she stated that she took 1/2 tab (12.5 mg) which did not help so she waited about 45 min then took another 12.5 mg and fell asleep for hours. The next evening she tried a 25 mg tab and again fell asleep and was very drowsy when she woke up . She states she has young children at home and does not want to take any medication if it will make her this sleepy.   She states she has a strong family history of anxiety and depression; mother and brother both take medication for anxiety.     She has not wanted to start taking a medication to help her with her symptoms however she feels that her symptoms have exacerbated after having children. She is concerned because she is worried all of the time. She is unable to fall asleep or stay asleep due to her worry. She has been having increased panic attacks due to obsessing over worrying  "about \"everything\".   She explained that in highschool she felt she was somewhat compulsive and orderly. She had and continues to have a hard time with concentration and completing tasks. She denies every having formal psychiatry evaluation. Would recommend this for her.     She describes her panic attacks as being overwhelming; sweating, rapid heart rate, and feeling of doom. \"its just awful and I can't control it\"     She was referred to therapy which she states she has not done in past and feels that this will help her.       In the past two weeks have you had thoughts of suicide or self-harm?  No.    Do you have concerns about your personal safety or the safety of others?   No  PHQ-9  English  PHQ-9   Any Language  TYREL-7  Suicide Assessment Five-step Evaluation and Treatment (SAFE-T)  Problem list and histories reviewed & adjusted, as indicated.  Additional history: as documented      Patient Active Problem List   Diagnosis     Normal labor     Pain in joint, multiple sites     Postoperative pain     Delivery normal     Vaginal bleeding     Vaginal discharge     TYREL (generalized anxiety disorder)     Mild major depression (H)     Panic attack     Past Surgical History:   Procedure Laterality Date     GALLBLADDER SURGERY         Social History   Substance Use Topics     Smoking status: Never Smoker     Smokeless tobacco: Never Used     Alcohol use No     Family History   Problem Relation Age of Onset     Anxiety Disorder Mother      Depression Mother      Anxiety Disorder Brother      Depression Brother          Current Outpatient Prescriptions   Medication Sig Dispense Refill     DULoxetine (CYMBALTA) 20 MG EC capsule Take 1 capsule (20 mg) by mouth daily 60 capsule 0     ALPRAZolam (XANAX) 0.25 MG tablet Take 1 tablet (0.25 mg) by mouth 3 times daily as needed for anxiety 10 tablet 0     hydrOXYzine (ATARAX) 25 MG tablet Take 1-2 tablets (25-50 mg) by mouth every 6 hours as needed for anxiety Do not take while " driving or operating machinery. 60 tablet 1     Allergies   Allergen Reactions     Aminothiols      Amoxicillin      Penicillins      BP Readings from Last 3 Encounters:   03/07/18 110/70   03/06/18 104/66   03/06/18 104/66    Wt Readings from Last 3 Encounters:   03/07/18 107 lb (48.5 kg)   03/06/18 106 lb (48.1 kg)   03/06/18 106 lb (48.1 kg)                  Labs reviewed in EPIC    ROS:  Constitutional, HEENT, cardiovascular, pulmonary, gi and gu systems are negative, except as otherwise noted.    OBJECTIVE:     /70 (BP Location: Left arm, Patient Position: Chair, Cuff Size: Adult Regular)  Pulse 88  Temp 97.7  F (36.5  C) (Oral)  Resp 16  Wt 107 lb (48.5 kg)  SpO2 100%  BMI 19.57 kg/m2  Body mass index is 19.57 kg/(m^2).  GENERAL: healthy, alert and no distress  EYES: Eyes grossly normal to inspection, PERRL and conjunctivae and sclerae normal  NECK: no adenopathy, no asymmetry, masses, or scars and thyroid normal to palpation  RESP: lungs clear to auscultation - no rales, rhonchi or wheezes  CV: regular rate and rhythm, normal S1 S2, no S3 or S4, no murmur, click or rub, no peripheral edema and peripheral pulses strong  MS: no gross musculoskeletal defects noted, no edema  SKIN: no suspicious lesions or rashes  PSYCH: mentation appears normal, tearful, anxious, judgement and insight intact and appearance well groomed    Diagnostic Test Results:  none     ASSESSMENT/PLAN:     1. TYREL (generalized anxiety disorder)  Recommend starting an SNRI due to the amount of worry she has. Encouraged to follow up with therapy as she has planned.   Side effects of medications, proper use, the associated risk/benefits and other options were discussed. Patient understands these risks and agrees to take the medication as instructed.   Recommend starting at a low dose and increasing as tolerated with close monitoring. Discussed the length of time it will take this medication to have optimal therapeutic effect (6 wks)    - DULoxetine (CYMBALTA) 20 MG EC capsule; Take 1 capsule (20 mg) by mouth daily  Dispense: 60 capsule; Refill: 0    2. Panic attack  Due to the severity of her panic attacks recommend Alprazolam discussed taking 1/2 tab (12.5 mg) if not effective may take total of 12.5 mg and as directed.   Discussed that this is a medication that is for short term use only until able to control her anxiety and reach a therapeutic dose of Duloxetine and establish therapy. She voices understanding.   - ALPRAZolam (XANAX) 0.25 MG tablet; Take 1 tablet (0.25 mg) by mouth 3 times daily as needed for anxiety  Dispense: 10 tablet; Refill: 0    Patient Instructions   Please return to clinic in 6 weeks for follow up anxiety and new medication treatment plan.     Thank you  [facility]

## 2018-03-08 PROBLEM — F41.0 PANIC ATTACK: Status: ACTIVE | Noted: 2018-03-08

## 2018-03-08 ASSESSMENT — ANXIETY QUESTIONNAIRES: GAD7 TOTAL SCORE: 19

## 2018-03-08 ASSESSMENT — PATIENT HEALTH QUESTIONNAIRE - PHQ9: SUM OF ALL RESPONSES TO PHQ QUESTIONS 1-9: 13

## 2018-03-14 NOTE — PROGRESS NOTES
SUBJECTIVE:   Lizzie Oconnell is a 28 year old female who presents to clinic today for the following health issues:      HPI  Anxiety Follow-Up    Status since last visit: Improved     Other associated symptoms:trouble sleeping     Complicating factors:   Significant life event: Yes-  Grandpa passed away    Current substance abuse: None  Depression symptoms: No  TYRLE-7 SCORE 2/28/2018 3/7/2018   Total Score 21 (severe anxiety) -   Total Score 21 19     PHQ-9 SCORE 3/6/2018 3/7/2018 4/18/2018   Total Score MyChart 13 (Moderate depression) 13 (Moderate depression) -   Total Score 13 13 5     TYREL-7 SCORE 2/28/2018 3/7/2018 4/18/2018   Total Score 21 (severe anxiety) - -   Total Score 21 19 13     Has been using xanax very sparingly 1/2 of a 0.25 mg tab at a time that is helping a great deal.   She also started taking Cymbalta daily which she states has decreased the amount of panic attacks that she has been having.   Stressors include her grandfather passed away in past 2 weeks and she is going to be the maid of honor in best friends wedding in July. She is in another wedding in May that is out of town she has a phobia of flying.   She would also like to discuss that she and her spouse are going to be trying to have a baby.  And she is concerned about taking medication while she is pregnant.      TYREL-7  Problem list and histories reviewed & adjusted, as indicated.  Additional history: as documented        Patient Active Problem List   Diagnosis     Normal labor     Pain in joint, multiple sites     Postoperative pain     Delivery normal     Vaginal bleeding     Vaginal discharge     TYREL (generalized anxiety disorder)     Mild major depression (H)     Panic attack     Past Surgical History:   Procedure Laterality Date     GALLBLADDER SURGERY         Social History   Substance Use Topics     Smoking status: Never Smoker     Smokeless tobacco: Never Used     Alcohol use No     Family History   Problem Relation Age of  Onset     Anxiety Disorder Mother      Depression Mother      Anxiety Disorder Brother      Depression Brother          Current Outpatient Prescriptions   Medication Sig Dispense Refill     ALPRAZolam (XANAX) 0.25 MG tablet Take 1 tablet (0.25 mg) by mouth 3 times daily as needed for anxiety 10 tablet 0     DULoxetine (CYMBALTA) 20 MG EC capsule Take 1 capsule (20 mg) by mouth daily 60 capsule 1     Allergies   Allergen Reactions     Aminothiols      Amoxicillin      Penicillins      BP Readings from Last 3 Encounters:   04/18/18 118/70   04/04/18 104/68   03/07/18 110/70    Wt Readings from Last 3 Encounters:   04/18/18 108 lb (49 kg)   04/04/18 106 lb (48.1 kg)   03/07/18 107 lb (48.5 kg)                  Labs reviewed in EPIC    ROS:  Constitutional, HEENT, cardiovascular, pulmonary, gi and gu systems are negative, except as otherwise noted.    OBJECTIVE:     /70 (BP Location: Left arm, Patient Position: Chair, Cuff Size: Adult Regular)  Pulse 70  Temp 98.1  F (36.7  C) (Oral)  Resp 16  Wt 108 lb (49 kg)  BMI 19.75 kg/m2  Body mass index is 19.75 kg/(m^2).  GENERAL: healthy, alert and no distress  NECK: no adenopathy, no asymmetry, masses, or scars and thyroid normal to palpation  RESP: lungs clear to auscultation - no rales, rhonchi or wheezes  CV: regular rate and rhythm, normal S1 S2, no S3 or S4, no murmur, click or rub, no peripheral edema and peripheral pulses strong  MS: no gross musculoskeletal defects noted, no edema  PSYCH: mentation appears normal, affect normal/bright    ASSESSMENT/PLAN:       1. TYREL (generalized anxiety disorder)  Refill given for Cymbalta she denies current side effects of this medication. Discussed the use of Xanax she has been utilizing this very minimally as stated above.  - DULoxetine (CYMBALTA) 20 MG EC capsule; Take 1 capsule (20 mg) by mouth daily  Dispense: 60 capsule; Refill: 1    Discussed pregnancy risks related to SSRI and SNRI medications. Noted that ACOG  states risk of being off therapy can also be detrimental to fetus as well as the risks of being on the medication.  Both would have to be weighed.  Discussed that she may continue on Cymbalta until she is known to be pregnant and then she has 6 weeks to wean off of this medication once pregnancy is confirmed if this is a path that she chooses.  Recommended starting to interview obstetricians at this time to build a relationship and have a discussion related to this with her OB/GYN.    Home care instructions were reviewed with the patient. The risks, benefits and treatment options of prescribed medications or other treatments have been discussed with the patient. The patient verbalized their understanding and should call or follow up if no improvement or if they develop further problems.      Patient Instructions   May have Xanax 0.25 mg 10 tabs refilled if needed in May or later.   Please follow in 6 months. You are also due for physical including pap.     Thank you  [facility]

## 2018-04-04 ENCOUNTER — OFFICE VISIT (OUTPATIENT)
Dept: FAMILY MEDICINE | Facility: OTHER | Age: 28
End: 2018-04-04
Payer: COMMERCIAL

## 2018-04-04 VITALS
RESPIRATION RATE: 16 BRPM | BODY MASS INDEX: 19.51 KG/M2 | HEIGHT: 62 IN | TEMPERATURE: 98.7 F | OXYGEN SATURATION: 98 % | WEIGHT: 106 LBS | DIASTOLIC BLOOD PRESSURE: 68 MMHG | HEART RATE: 82 BPM | SYSTOLIC BLOOD PRESSURE: 104 MMHG

## 2018-04-04 DIAGNOSIS — J02.0 STREPTOCOCCAL SORE THROAT: Primary | ICD-10-CM

## 2018-04-04 DIAGNOSIS — J32.9 BACTERIAL SINUSITIS: ICD-10-CM

## 2018-04-04 DIAGNOSIS — B96.89 BACTERIAL SINUSITIS: ICD-10-CM

## 2018-04-04 LAB
DEPRECATED S PYO AG THROAT QL EIA: NORMAL
SPECIMEN SOURCE: NORMAL

## 2018-04-04 PROCEDURE — 99213 OFFICE O/P EST LOW 20 MIN: CPT | Performed by: NURSE PRACTITIONER

## 2018-04-04 PROCEDURE — 87880 STREP A ASSAY W/OPTIC: CPT | Performed by: NURSE PRACTITIONER

## 2018-04-04 PROCEDURE — 87081 CULTURE SCREEN ONLY: CPT | Performed by: NURSE PRACTITIONER

## 2018-04-04 RX ORDER — AZITHROMYCIN 250 MG/1
TABLET, FILM COATED ORAL
Qty: 6 TABLET | Refills: 0 | Status: SHIPPED | OUTPATIENT
Start: 2018-04-04 | End: 2018-04-18

## 2018-04-04 NOTE — PATIENT INSTRUCTIONS
Please take antibiotic with a probiotic or yogurt (3 small containers) daily not directly with the antibiotic for optimal good bacterial protection.     Return to clinic in 3-5 days is symptoms are not improving or worsening    Thank you  [facility] CNP    Sinusitis   What is sinusitis?   Sinusitis is swollen, infected linings of the sinuses. The sinuses are hollow spaces in the bones of your face and skull. They connect with the nose through small openings. Like the nose, their linings make mucus.   How does it occur?   Sinusitis occurs when the sinus linings become infected. The passageways from the sinuses to the nose are very narrow. Swelling and mucus may block the passageways. This leads to pressure changes in the sinuses that can be painful.   A number of things can cause swelling and sinusitis. Most often it's allergens (things that cause allergies, like pollen and mold) and viruses, such as viruses that cause the common cold. Whether the cause is allergies or a virus, the sinus linings can swell. When swelling causes the sinus passageway to swell shut, bacteria, viruses, and even fungus can be trapped in the sinuses and cause a sinus infection.   If your nasal bones have been injured or are deformed, causing partial blockage of the sinus openings, you are more likely to get sinusitis.   What are the symptoms?   Symptoms include:   feeling of fullness or pressure in your head   a headache that is most painful when you first wake up in the morning or when you bend your head down or forward   pain above or below your eyes   aching in the upper jaw and teeth   runny or stuffy nose   cough, especially at night   fluid draining down the back of your throat (postnasal drainage)   sore throat in the morning or evening.   How is it diagnosed?   Your healthcare provider will ask about your symptoms and will examine you. You may have an X-ray to look for swelling, fluid, or small benign growths (polyps) in the  sinuses.   How is it treated?   Decongestants may help. They may be nonprescription or prescription. They are available as liquids, pills, and nose sprays.   Your healthcare provider may prescribe an antibiotic. In some cases you may need to take decongestants and antibiotics for several weeks.   You may need nonprescription medicine for pain, such as acetaminophen or ibuprofen. Children under 18 years of age should not take aspirin or products containing salicylate (such as Pepto-Bismol) because of the risk of Reye's syndrome unless recommended by a healthcare provider.   If you have chronic or repeated sinus infections, allergies may be the cause. Your healthcare provider may prescribe antihistamine tablets or prescription nasal sprays (steroids or cromolyn) to treat the allergies.   If you have chronic, severe sinusitis that does not respond to treatment with medicines, surgery may be done. The surgeon can create an extra or enlarged passageway in the wall of the sinus cavity. This allows the sinuses to drain more easily through the nasal passages. This should help them stay free of infection.   How long will the effects last?   Symptoms may improve gradually over 3 to 10 days. Depending on what caused the sinusitis and how severe it is, it may last for days or weeks.   How can I take care of myself?   Follow your healthcare provider's instructions.   Avoid tobacco smoke.   If you have allergies, take care to avoid the things you are allergic to, such as animal dander.   Add moisture to the air with a humidifier or a vaporizer, unless you have mold allergy (mold may grow in your vaporizer).   Inhale steam from a basin of hot water or shower to open your sinuses and relieve pain.   Use saline nasal sprays to help wash out nasal passages and clear some mucus from the airways.   Use decongestants as directed on the label or by your provider.   If you are using a nonprescription nasal-spray decongestant, generally  you should not use it for more than 3 days. After 3 days it may cause your symptoms to get worse. Ask your healthcare provider if it is OK for you to use a nasal spray decongestant longer than this.   Get plenty of rest.   Drink more fluids to keep the mucus as moist and liquid as possible to help draining of your sinuses.   Put warm compresses on painful areas.   Take antibiotics as prescribed. Use all of the medicine, even after you feel better. Some sinus infections require 2 to 4 weeks of antibiotic treatment.   See your healthcare provider if the pain lasts for several days or gets worse.   If the sinus areas above or below your eyes are swollen or bulging, see your healthcare provider right away. This symptom may mean that the infection is spreading. A spreading infection can affect other parts of your body--even the brain--and needs to be treated promptly.   How can I help prevent sinusitis?   Treat your colds and allergies promptly. Use decongestants as soon as you start having symptoms.   Do not smoke and stay away from secondhand smoke.   Drink lots of fluids to keep the mucus thin.   Humidify your home if the air is particularly dry.   If you have sinus infections often, consider having allergy tests.   If sinusitis continues to be a problem despite treatment, you might need an exam by an ear, nose, and throat doctor (called an ENT or otolaryngologist). The specialist will check for polyps or a deformed bone that may be blocking your sinuses.

## 2018-04-04 NOTE — MR AVS SNAPSHOT
After Visit Summary   4/4/2018    Lizzie Oconnell    MRN: [id number]           Patient Information     Date Of Birth          1990        Visit Information        Provider Department      4/4/2018 4:00 PM Belle Correia APRN CNP [facility]        Today's Diagnoses     Streptococcal sore throat    -  1    Bacterial sinusitis          Care Instructions    Please take antibiotic with a probiotic or yogurt (3 small containers) daily not directly with the antibiotic for optimal good bacterial protection.     Return to clinic in 3-5 days is symptoms are not improving or worsening    Thank you  Belle Correia CNP    Sinusitis   What is sinusitis?   Sinusitis is swollen, infected linings of the sinuses. The sinuses are hollow spaces in the bones of your face and skull. They connect with the nose through small openings. Like the nose, their linings make mucus.   How does it occur?   Sinusitis occurs when the sinus linings become infected. The passageways from the sinuses to the nose are very narrow. Swelling and mucus may block the passageways. This leads to pressure changes in the sinuses that can be painful.   A number of things can cause swelling and sinusitis. Most often it's allergens (things that cause allergies, like pollen and mold) and viruses, such as viruses that cause the common cold. Whether the cause is allergies or a virus, the sinus linings can swell. When swelling causes the sinus passageway to swell shut, bacteria, viruses, and even fungus can be trapped in the sinuses and cause a sinus infection.   If your nasal bones have been injured or are deformed, causing partial blockage of the sinus openings, you are more likely to get sinusitis.   What are the symptoms?   Symptoms include:   feeling of fullness or pressure in your head   a headache that is most painful when you first wake up in the morning or when you bend your head down or forward   pain above or  below your eyes   aching in the upper jaw and teeth   runny or stuffy nose   cough, especially at night   fluid draining down the back of your throat (postnasal drainage)   sore throat in the morning or evening.   How is it diagnosed?   Your healthcare provider will ask about your symptoms and will examine you. You may have an X-ray to look for swelling, fluid, or small benign growths (polyps) in the sinuses.   How is it treated?   Decongestants may help. They may be nonprescription or prescription. They are available as liquids, pills, and nose sprays.   Your healthcare provider may prescribe an antibiotic. In some cases you may need to take decongestants and antibiotics for several weeks.   You may need nonprescription medicine for pain, such as acetaminophen or ibuprofen. Children under 18 years of age should not take aspirin or products containing salicylate (such as Pepto-Bismol) because of the risk of Reye's syndrome unless recommended by a healthcare provider.   If you have chronic or repeated sinus infections, allergies may be the cause. Your healthcare provider may prescribe antihistamine tablets or prescription nasal sprays (steroids or cromolyn) to treat the allergies.   If you have chronic, severe sinusitis that does not respond to treatment with medicines, surgery may be done. The surgeon can create an extra or enlarged passageway in the wall of the sinus cavity. This allows the sinuses to drain more easily through the nasal passages. This should help them stay free of infection.   How long will the effects last?   Symptoms may improve gradually over 3 to 10 days. Depending on what caused the sinusitis and how severe it is, it may last for days or weeks.   How can I take care of myself?   Follow your healthcare provider's instructions.   Avoid tobacco smoke.   If you have allergies, take care to avoid the things you are allergic to, such as animal dander.   Add moisture to the air with a humidifier or a  vaporizer, unless you have mold allergy (mold may grow in your vaporizer).   Inhale steam from a basin of hot water or shower to open your sinuses and relieve pain.   Use saline nasal sprays to help wash out nasal passages and clear some mucus from the airways.   Use decongestants as directed on the label or by your provider.   If you are using a nonprescription nasal-spray decongestant, generally you should not use it for more than 3 days. After 3 days it may cause your symptoms to get worse. Ask your healthcare provider if it is OK for you to use a nasal spray decongestant longer than this.   Get plenty of rest.   Drink more fluids to keep the mucus as moist and liquid as possible to help draining of your sinuses.   Put warm compresses on painful areas.   Take antibiotics as prescribed. Use all of the medicine, even after you feel better. Some sinus infections require 2 to 4 weeks of antibiotic treatment.   See your healthcare provider if the pain lasts for several days or gets worse.   If the sinus areas above or below your eyes are swollen or bulging, see your healthcare provider right away. This symptom may mean that the infection is spreading. A spreading infection can affect other parts of your body--even the brain--and needs to be treated promptly.   How can I help prevent sinusitis?   Treat your colds and allergies promptly. Use decongestants as soon as you start having symptoms.   Do not smoke and stay away from secondhand smoke.   Drink lots of fluids to keep the mucus thin.   Humidify your home if the air is particularly dry.   If you have sinus infections often, consider having allergy tests.   If sinusitis continues to be a problem despite treatment, you might need an exam by an ear, nose, and throat doctor (called an ENT or otolaryngologist). The specialist will check for polyps or a deformed bone that may be blocking your sinuses.             Follow-ups after your visit        Your next 10  "appointments already scheduled     Apr 12, 2018  2:00 PM CDT   (Arrive by 1:30 PM)   New Visit with Shannon Sawyerlure   [facility] ([facility])    [address]  [place] [id number] [phone]            Apr 18, 2018  4:00 PM CDT   [facility] with DENNYS Camargo CNP   [facility]    [address]  [place] [id number] [phone]           Bring a current list of meds and any records pertaining to this visit. For Physicals, please bring immunization records and any forms needing to be filled out. Please arrive 10 minutes early to complete paperwork.            Apr 19, 2018  4:00 PM CDT   [facility]    [address]  [place] [id number] [phone]              Who to contact     If you have questions or need follow up information about today's clinic visit or your schedule please contact [facility] directly at [phone].  Normal or non-critical lab and imaging results will be communicated to you by MyChart, letter or phone within 4 business days after the clinic has received the results. If you do not hear from us within 7 days, please contact the clinic through [employer] or phone. If you have a critical or abnormal lab result, we will notify you by phone as soon as possible.  Submit refill requests through [employer] ( [employer] ) or call your pharmacy and they will forward the refill request to us. Please allow 3 business days for your refill to be completed.          Additional Information About Your Visit        [employer] ( [employer] ) Information     [employer] ( [employer] ) lets you send messages to your doctor, view your test results, renew your prescriptions, schedule appointments and more. To sign up, go to [email or web address] . Click on \"Log in\" on the left side of the screen, which will take you to the " "Welcome page. Then click on \"Sign up Now\" on the right side of the page.     You will be asked to enter the access code listed below, as well as some personal information. Please follow the directions to create your username and password.     Your access code is: [email or web address]  Expires: 2018  5:37 PM     Your access code will  in 90 days. If you need help or a new code, please call your [facility] or [phone].        Care EveryWhere ID     This is your Care EveryWhere ID. This could be used by other organizations to access your [facility] medical records  [id number]        Your Vitals Were     Pulse Temperature Respirations Height Pulse Oximetry BMI (Body Mass Index)    82 98.7  F (37.1  C) (Temporal) 16 5' 2\" (1.575 m) 98% 19.39 kg/m2       Blood Pressure from Last 3 Encounters:   18 104/68   18 110/70   18 104/66    Weight from Last 3 Encounters:   18 106 lb (48.1 kg)   18 107 lb (48.5 kg)   18 106 lb (48.1 kg)              We Performed the Following     Beta strep group A culture     Strep, Rapid Screen          Today's Medication Changes          These changes are accurate as of 18  4:12 PM.  If you have any questions, ask your nurse or doctor.               Start taking these medicines.        Dose/Directions    azithromycin 250 MG tablet   Commonly known as:  ZITHROMAX   Used for:  Bacterial sinusitis   Started by:  Belle Correia APRN CNP        Two tablets first day, then one tablet daily for four days.   Quantity:  6 tablet   Refills:  0            Where to get your medicines      These medications were sent to [place] [address] [place] [facility] [place] - [address]  [address] [facility] [address]     Phone:  [phone]     azithromycin 250 MG tablet                Primary Care Provider Office Phone # Fax #    DENNYS Camargo [phone]       [address]  [facility] [address]        Equal Access " to Services     LORE REES : Manasa Flores, waisaiah pulido, qaamina jung. So [facility] [phone].    ATENCIÓN: Si habla rachel, tiene a schafer disposición servicios gratuitos de asistencia lingüística. Llame al [phone].    We comply with applicable federal civil rights laws and [place] laws. We do not discriminate on the basis of race, color, national origin, age, disability, sex, sexual orientation, or gender identity.            Thank you!     Thank you for choosing [facility]  for your care. Our goal is always to provide you with excellent care. Hearing back from our patients is one way we can continue to improve our services. Please take a few minutes to complete the written survey that you may receive in the mail after your visit with us. Thank you!             Your Updated Medication List - Protect others around you: Learn how to safely use, store and throw away your medicines at www.disposemymeds.org.          This list is accurate as of 4/4/18  4:12 PM.  Always use your most recent med list.                   Brand Name Dispense Instructions for use Diagnosis    ALPRAZolam 0.25 MG tablet    XANAX    10 tablet    Take 1 tablet (0.25 mg) by mouth 3 times daily as needed for anxiety    Panic attack       azithromycin 250 MG tablet    ZITHROMAX    6 tablet    Two tablets first day, then one tablet daily for four days.    Bacterial sinusitis       DULoxetine 20 MG EC capsule    CYMBALTA    60 capsule    Take 1 capsule (20 mg) by mouth daily    TYREL (generalized anxiety disorder)

## 2018-04-04 NOTE — PROGRESS NOTES
"  SUBJECTIVE:   Lizzie Oconnell is a 28 year old female who presents to clinic today for the following health issues:      HPI  Acute Illness   Acute illness concerns: sore throat   Onset: 1 week     Fever: YES    Chills/Sweats: YES- chills    Headache (location?): no     Sinus Pressure:no    Conjunctivitis:  no    Ear Pain: YES: right    Rhinorrhea: no    Congestion: no    Sore Throat: YES     Cough: no    Wheeze: no    Decreased Appetite: no    Nausea: no    Vomiting: no    Diarrhea:  no    Dysuria/Freq.: no    Fatigue/Achiness: YES- achey    Sick/Strep Exposure: YES- son is now sick      Therapies Tried and outcome: tylenol and ibuprofen last dose was at 10 am.   Max temp 100. Ear pain in right ear waking her up at night      Problem list and histories reviewed & adjusted, as indicated.  Additional history: as documented    BP Readings from Last 3 Encounters:   04/04/18 104/68   03/07/18 110/70   03/06/18 104/66    Wt Readings from Last 3 Encounters:   04/04/18 106 lb (48.1 kg)   03/07/18 107 lb (48.5 kg)   03/06/18 106 lb (48.1 kg)                  Labs reviewed in EPIC    ROS:  Constitutional, HEENT, cardiovascular, pulmonary, gi and gu systems are negative, except as otherwise noted.    OBJECTIVE:     /68 (BP Location: Left arm, Patient Position: Chair, Cuff Size: Adult Regular)  Pulse 82  Temp 98.7  F (37.1  C) (Temporal)  Resp 16  Ht 5' 2\" (1.575 m)  Wt 106 lb (48.1 kg)  SpO2 98%  BMI 19.39 kg/m2  Body mass index is 19.39 kg/(m^2).  GENERAL: alert and mild distress  EYES: Eyes grossly normal to inspection, PERRL and conjunctivae and sclerae normal  HENT: normal cephalic/atraumatic, right ear: erythematous, left ear: normal: no effusions, no erythema, normal landmarks, nose and mouth without ulcers or lesions, nasal mucosa edematous , rhinorrhea yellow, oropharynx clear, oral mucous membranes moist and tonsillar erythema  NECK: bilateral anterior cervical adenopathy, no asymmetry, masses, or " scars and thyroid normal to palpation  RESP: lungs clear to auscultation - no rales, rhonchi or wheezes  CV: regular rate and rhythm, normal S1 S2, no S3 or S4, no murmur, click or rub, no peripheral edema and peripheral pulses strong  MS: no gross musculoskeletal defects noted, no edema    ASSESSMENT/PLAN:     1. Streptococcal sore throat    - Strep, Rapid Screen  - Beta strep group A culture    2. Bacterial sinusitis  Side effects of medications, proper use, the associated risk/benefits and other options were discussed. Patient understands these risks and agrees to take the medication as instructed.     - azithromycin (ZITHROMAX) 250 MG tablet; Two tablets first day, then one tablet daily for four days.  Dispense: 6 tablet; Refill: 0    Patient Instructions   Please take antibiotic with a probiotic or yogurt (3 small containers) daily not directly with the antibiotic for optimal good bacterial protection.     Return to clinic in 3-5 days is symptoms are not improving or worsening    Thank you  Belle Correia CNP    Sinusitis   What is sinusitis?   Sinusitis is swollen, infected linings of the sinuses. The sinuses are hollow spaces in the bones of your face and skull. They connect with the nose through small openings. Like the nose, their linings make mucus.   How does it occur?   Sinusitis occurs when the sinus linings become infected. The passageways from the sinuses to the nose are very narrow. Swelling and mucus may block the passageways. This leads to pressure changes in the sinuses that can be painful.   A number of things can cause swelling and sinusitis. Most often it's allergens (things that cause allergies, like pollen and mold) and viruses, such as viruses that cause the common cold. Whether the cause is allergies or a virus, the sinus linings can swell. When swelling causes the sinus passageway to swell shut, bacteria, viruses, and even fungus can be trapped in the sinuses and cause a sinus infection.    If your nasal bones have been injured or are deformed, causing partial blockage of the sinus openings, you are more likely to get sinusitis.   What are the symptoms?   Symptoms include:   feeling of fullness or pressure in your head   a headache that is most painful when you first wake up in the morning or when you bend your head down or forward   pain above or below your eyes   aching in the upper jaw and teeth   runny or stuffy nose   cough, especially at night   fluid draining down the back of your throat (postnasal drainage)   sore throat in the morning or evening.   How is it diagnosed?   Your healthcare provider will ask about your symptoms and will examine you. You may have an X-ray to look for swelling, fluid, or small benign growths (polyps) in the sinuses.   How is it treated?   Decongestants may help. They may be nonprescription or prescription. They are available as liquids, pills, and nose sprays.   Your healthcare provider may prescribe an antibiotic. In some cases you may need to take decongestants and antibiotics for several weeks.   You may need nonprescription medicine for pain, such as acetaminophen or ibuprofen. Children under 18 years of age should not take aspirin or products containing salicylate (such as Pepto-Bismol) because of the risk of Reye's syndrome unless recommended by a healthcare provider.   If you have chronic or repeated sinus infections, allergies may be the cause. Your healthcare provider may prescribe antihistamine tablets or prescription nasal sprays (steroids or cromolyn) to treat the allergies.   If you have chronic, severe sinusitis that does not respond to treatment with medicines, surgery may be done. The surgeon can create an extra or enlarged passageway in the wall of the sinus cavity. This allows the sinuses to drain more easily through the nasal passages. This should help them stay free of infection.   How long will the effects last?   Symptoms may improve  gradually over 3 to 10 days. Depending on what caused the sinusitis and how severe it is, it may last for days or weeks.   How can I take care of myself?   Follow your healthcare provider's instructions.   Avoid tobacco smoke.   If you have allergies, take care to avoid the things you are allergic to, such as animal dander.   Add moisture to the air with a humidifier or a vaporizer, unless you have mold allergy (mold may grow in your vaporizer).   Inhale steam from a basin of hot water or shower to open your sinuses and relieve pain.   Use saline nasal sprays to help wash out nasal passages and clear some mucus from the airways.   Use decongestants as directed on the label or by your provider.   If you are using a nonprescription nasal-spray decongestant, generally you should not use it for more than 3 days. After 3 days it may cause your symptoms to get worse. Ask your healthcare provider if it is OK for you to use a nasal spray decongestant longer than this.   Get plenty of rest.   Drink more fluids to keep the mucus as moist and liquid as possible to help draining of your sinuses.   Put warm compresses on painful areas.   Take antibiotics as prescribed. Use all of the medicine, even after you feel better. Some sinus infections require 2 to 4 weeks of antibiotic treatment.   See your healthcare provider if the pain lasts for several days or gets worse.   If the sinus areas above or below your eyes are swollen or bulging, see your healthcare provider right away. This symptom may mean that the infection is spreading. A spreading infection can affect other parts of your body--even the brain--and needs to be treated promptly.   How can I help prevent sinusitis?   Treat your colds and allergies promptly. Use decongestants as soon as you start having symptoms.   Do not smoke and stay away from secondhand smoke.   Drink lots of fluids to keep the mucus thin.   Humidify your home if the air is particularly dry.   If you  have sinus infections often, consider having allergy tests.   If sinusitis continues to be a problem despite treatment, you might need an exam by an ear, nose, and throat doctor (called an ENT or otolaryngologist). The specialist will check for polyps or a deformed bone that may be blocking your sinuses.         [place] [facility]

## 2018-04-04 NOTE — PROGRESS NOTES
Results discussed with patient in clinic. States understanding of these results.    Belle Correia CNP

## 2018-04-05 LAB
BACTERIA SPEC CULT: NORMAL
SPECIMEN SOURCE: NORMAL

## 2018-04-18 ENCOUNTER — OFFICE VISIT (OUTPATIENT)
Dept: FAMILY MEDICINE | Facility: OTHER | Age: 28
End: 2018-04-18
Payer: COMMERCIAL

## 2018-04-18 VITALS
WEIGHT: 108 LBS | DIASTOLIC BLOOD PRESSURE: 70 MMHG | HEART RATE: 70 BPM | RESPIRATION RATE: 16 BRPM | BODY MASS INDEX: 19.75 KG/M2 | SYSTOLIC BLOOD PRESSURE: 118 MMHG | TEMPERATURE: 98.1 F

## 2018-04-18 DIAGNOSIS — F41.1 GAD (GENERALIZED ANXIETY DISORDER): ICD-10-CM

## 2018-04-18 PROCEDURE — 99214 OFFICE O/P EST MOD 30 MIN: CPT | Performed by: NURSE PRACTITIONER

## 2018-04-18 RX ORDER — DULOXETIN HYDROCHLORIDE 20 MG/1
20 CAPSULE, DELAYED RELEASE ORAL DAILY
Qty: 60 CAPSULE | Refills: 1 | Status: SHIPPED | OUTPATIENT
Start: 2018-04-18 | End: 2018-08-28

## 2018-04-18 ASSESSMENT — ANXIETY QUESTIONNAIRES
7. FEELING AFRAID AS IF SOMETHING AWFUL MIGHT HAPPEN: SEVERAL DAYS
3. WORRYING TOO MUCH ABOUT DIFFERENT THINGS: NEARLY EVERY DAY
5. BEING SO RESTLESS THAT IT IS HARD TO SIT STILL: MORE THAN HALF THE DAYS
GAD7 TOTAL SCORE: 13
6. BECOMING EASILY ANNOYED OR IRRITABLE: SEVERAL DAYS
2. NOT BEING ABLE TO STOP OR CONTROL WORRYING: MORE THAN HALF THE DAYS
IF YOU CHECKED OFF ANY PROBLEMS ON THIS QUESTIONNAIRE, HOW DIFFICULT HAVE THESE PROBLEMS MADE IT FOR YOU TO DO YOUR WORK, TAKE CARE OF THINGS AT HOME, OR GET ALONG WITH OTHER PEOPLE: SOMEWHAT DIFFICULT
1. FEELING NERVOUS, ANXIOUS, OR ON EDGE: SEVERAL DAYS

## 2018-04-18 ASSESSMENT — PATIENT HEALTH QUESTIONNAIRE - PHQ9: 5. POOR APPETITE OR OVEREATING: NEARLY EVERY DAY

## 2018-04-18 NOTE — PATIENT INSTRUCTIONS
May have Xanax 0.25 mg 10 tabs refilled if needed in May or later.   Please follow in 6 months. You are also due for physical including pap.     Thank you  Belle Correia CNP

## 2018-04-18 NOTE — NURSING NOTE
"Chief Complaint   Patient presents with     RECHECK     Panel Management     tdap, pap       Initial /70 (BP Location: Left arm, Patient Position: Chair, Cuff Size: Adult Regular)  Pulse 70  Temp 98.1  F (36.7  C) (Oral)  Resp 16  Wt 108 lb (49 kg)  BMI 19.75 kg/m2 Estimated body mass index is 19.75 kg/(m^2) as calculated from the following:    Height as of 4/4/18: 5' 2\" (1.575 m).    Weight as of this encounter: 108 lb (49 kg).  Medication Reconciliation: complete    "

## 2018-04-18 NOTE — MR AVS SNAPSHOT
"              After Visit Summary   4/18/2018    Lizzie Oconnell    MRN: [id number]           Patient Information     Date Of Birth          1990        Visit Information        Provider Department      4/18/2018 4:00 PM Belle Correia APRN CNP [facility]        Today's Diagnoses     TYREL (generalized anxiety disorder)          Care Instructions    May have Xanax 0.25 mg 10 tabs refilled if needed in May or later.   Please follow in 6 months. You are also due for physical including pap.     Thank you  Belle Correia CNP            Follow-ups after your visit        Who to contact     If you have questions or need follow up information about today's clinic visit or your schedule please contact [facility] directly at [phone].  Normal or non-critical lab and imaging results will be communicated to you by MyChart, letter or phone within 4 business days after the clinic has received the results. If you do not hear from us within 7 days, please contact the clinic through MyChart or phone. If you have a critical or abnormal lab result, we will notify you by phone as soon as possible.  Submit refill requests through [employer] or call your pharmacy and they will forward the refill request to us. Please allow 3 business days for your refill to be completed.          Additional Information About Your Visit        MyChart Information     [employer] lets you send messages to your doctor, view your test results, renew your prescriptions, schedule appointments and more. To sign up, go to [email or web address] . Click on \"Log in\" on the left side of the screen, which will take you to the Welcome page. Then click on \"Sign up Now\" on the right side of the page.     You will be asked to enter the access code listed below, as well as some personal information. Please follow the directions to create your username and password.     Your access code is: [email or web address]  Expires: 5/29/2018  " 5:37 PM     Your access code will  in 90 days. If you need help or a new code, please call your [place] clinic or [phone].        Care EveryWhere ID     This is your Care EveryWhere ID. This could be used by other organizations to access your [place] medical records  [id number]        Your Vitals Were     Pulse Temperature Respirations BMI (Body Mass Index)          70 98.1  F (36.7  C) (Oral) 16 19.75 kg/m2         Blood Pressure from Last 3 Encounters:   18 118/70   18 104/68   18 110/70    Weight from Last 3 Encounters:   18 108 lb (49 kg)   18 106 lb (48.1 kg)   18 107 lb (48.5 kg)              Today, you had the following     No orders found for display         Where to get your medicines      These medications were sent to [place] [address] [place] [facility] [address] [address] [place] [address]  [address] [address] [address]     Phone:  [phone]     DULoxetine 20 MG EC capsule          Primary Care Provider Office Phone # Fax #    Belle Sue Bronson Correia, APRN [phone]       [address] [facility] [address]  [facility] [address]        Equal Access to Services     LORE REES AH: Hadii isabella ku hadasho Soomaali, waaxda luqadaha, qaybta kaalmada adeegyada, amina izquierdo haydonte jain . So [facility] [phone].    ATENCIÓN: Si habla español, tiene a schafer disposición servicios gratuitos de asistencia lingüística. Llame al [phone].    We comply with applicable federal civil rights laws and [place] laws. We do not discriminate on the basis of race, color, national origin, age, disability, sex, sexual orientation, or gender identity.            Thank you!     Thank you for choosing [facility]  for your care. Our goal is always to provide you with excellent care. Hearing back from our patients is one way we can continue to improve our services. Please take a few minutes to complete the written survey that you may receive in the mail after  your visit with us. Thank you!             Your Updated Medication List - Protect others around you: Learn how to safely use, store and throw away your medicines at www.disposemymeds.org.          This list is accurate as of 4/18/18  4:30 PM.  Always use your most recent med list.                   Brand Name Dispense Instructions for use Diagnosis    ALPRAZolam 0.25 MG tablet    XANAX    10 tablet    Take 1 tablet (0.25 mg) by mouth 3 times daily as needed for anxiety    Panic attack       DULoxetine 20 MG EC capsule    CYMBALTA    60 capsule    Take 1 capsule (20 mg) by mouth daily    TYREL (generalized anxiety disorder)

## 2018-04-19 ASSESSMENT — ANXIETY QUESTIONNAIRES: GAD7 TOTAL SCORE: 13

## 2018-04-19 ASSESSMENT — PATIENT HEALTH QUESTIONNAIRE - PHQ9: SUM OF ALL RESPONSES TO PHQ QUESTIONS 1-9: 5

## 2018-05-08 DIAGNOSIS — F41.0 PANIC ATTACK: ICD-10-CM

## 2018-05-08 NOTE — TELEPHONE ENCOUNTER
Requested Prescriptions   Pending Prescriptions Disp Refills     ALPRAZolam (XANAX) 0.25 MG tablet [Pharmacy Med Name: ALPRAZOLAM 0.25 MG TABLET] 10 tablet 0     Sig: TAKE 1 TABLET BY MOUTH THREE TIMES A DAY AS NEEDED FOR ANXIETY    There is no refill protocol information for this order

## 2018-05-10 RX ORDER — ALPRAZOLAM 0.25 MG
TABLET ORAL
Qty: 10 TABLET | Refills: 0 | Status: SHIPPED | OUTPATIENT
Start: 2018-05-10 | End: 2018-07-09

## 2018-05-10 NOTE — TELEPHONE ENCOUNTER
Refill given for Xanax 0.25 mg 10 tabs please fax [facility].     Thank you  Belle Correia CNP

## 2018-05-22 ENCOUNTER — TELEPHONE (OUTPATIENT)
Dept: FAMILY MEDICINE | Facility: OTHER | Age: 28
End: 2018-05-22

## 2018-05-22 NOTE — TELEPHONE ENCOUNTER
Please abstract the following data from this visit with this patient into the appropriate field in Epic:    Pap smear done on this date: 08/2016 (approximately), by this group: [facility], results.    Juana Quintero       Panel Management Review      Patient has the following on her problem list:     Depression / Dysthymia review    Measure:  Needs PHQ-9 score of 4 or less during index window.  Administer PHQ-9 and if score is 5 or more, send encounter to provider for next steps.        PHQ-9 SCORE 3/6/2018 3/7/2018 4/18/2018   Total Score MyChart 13 (Moderate depression) 13 (Moderate depression) -   Total Score 13 13 5       If PHQ-9 recheck is 5 or more, route to provider for next steps.    Patient is due for:  None      Composite cancer screening  Chart review shows that this patient is due/due soon for the following Pap Smear  Summary:    Patient is due/failing the following:   PAP    Action needed:   Patient needs office visit for PAP .    Type of outreach:    Phone, spoke to patient.  patient states she completed PAP in 08/2016 after her son was born at [facility] in [place]     Questions for provider review:    None                                                                                                                                    Juana Quintero       Chart routed to Care Team .

## 2018-07-09 ENCOUNTER — PRENATAL OFFICE VISIT (OUTPATIENT)
Dept: OBGYN | Facility: OTHER | Age: 28
End: 2018-07-09
Payer: COMMERCIAL

## 2018-07-09 VITALS
WEIGHT: 112.5 LBS | BODY MASS INDEX: 20.7 KG/M2 | DIASTOLIC BLOOD PRESSURE: 76 MMHG | HEIGHT: 62 IN | SYSTOLIC BLOOD PRESSURE: 110 MMHG | HEART RATE: 66 BPM

## 2018-07-09 DIAGNOSIS — Z34.80 SUPERVISION OF OTHER NORMAL PREGNANCY, ANTEPARTUM: Primary | ICD-10-CM

## 2018-07-09 LAB
ABO + RH BLD: NORMAL
ABO + RH BLD: NORMAL
ALBUMIN UR-MCNC: NEGATIVE MG/DL
APPEARANCE UR: CLEAR
BILIRUB UR QL STRIP: NEGATIVE
BLD GP AB SCN SERPL QL: NORMAL
BLOOD BANK CMNT PATIENT-IMP: NORMAL
COLOR UR AUTO: YELLOW
ERYTHROCYTE [DISTWIDTH] IN BLOOD BY AUTOMATED COUNT: 13.2 % (ref 10–15)
GLUCOSE UR STRIP-MCNC: NEGATIVE MG/DL
HCT VFR BLD AUTO: 38.5 % (ref 35–47)
HGB BLD-MCNC: 12.6 G/DL (ref 11.7–15.7)
HGB UR QL STRIP: NEGATIVE
KETONES UR STRIP-MCNC: NEGATIVE MG/DL
LEUKOCYTE ESTERASE UR QL STRIP: NEGATIVE
MCH RBC QN AUTO: 29.4 PG (ref 26.5–33)
MCHC RBC AUTO-ENTMCNC: 32.7 G/DL (ref 31.5–36.5)
MCV RBC AUTO: 90 FL (ref 78–100)
NITRATE UR QL: NEGATIVE
PH UR STRIP: 6.5 PH (ref 5–7)
PLATELET # BLD AUTO: 331 10E9/L (ref 150–450)
RBC # BLD AUTO: 4.29 10E12/L (ref 3.8–5.2)
SOURCE: NORMAL
SP GR UR STRIP: <=1.005 (ref 1–1.03)
SPECIMEN EXP DATE BLD: NORMAL
UROBILINOGEN UR STRIP-ACNC: 0.2 EU/DL (ref 0.2–1)
WBC # BLD AUTO: 10.2 10E9/L (ref 4–11)

## 2018-07-09 PROCEDURE — 81003 URINALYSIS AUTO W/O SCOPE: CPT | Performed by: OBSTETRICS & GYNECOLOGY

## 2018-07-09 PROCEDURE — 36415 COLL VENOUS BLD VENIPUNCTURE: CPT | Performed by: OBSTETRICS & GYNECOLOGY

## 2018-07-09 PROCEDURE — 87086 URINE CULTURE/COLONY COUNT: CPT | Performed by: OBSTETRICS & GYNECOLOGY

## 2018-07-09 PROCEDURE — 85027 COMPLETE CBC AUTOMATED: CPT | Performed by: OBSTETRICS & GYNECOLOGY

## 2018-07-09 PROCEDURE — 87340 HEPATITIS B SURFACE AG IA: CPT | Performed by: OBSTETRICS & GYNECOLOGY

## 2018-07-09 PROCEDURE — 99207 ZZC FIRST OB VISIT: CPT | Performed by: OBSTETRICS & GYNECOLOGY

## 2018-07-09 PROCEDURE — 86850 RBC ANTIBODY SCREEN: CPT | Performed by: OBSTETRICS & GYNECOLOGY

## 2018-07-09 PROCEDURE — 86900 BLOOD TYPING SEROLOGIC ABO: CPT | Performed by: OBSTETRICS & GYNECOLOGY

## 2018-07-09 PROCEDURE — 86901 BLOOD TYPING SEROLOGIC RH(D): CPT | Performed by: OBSTETRICS & GYNECOLOGY

## 2018-07-09 PROCEDURE — 86780 TREPONEMA PALLIDUM: CPT | Performed by: OBSTETRICS & GYNECOLOGY

## 2018-07-09 PROCEDURE — 86762 RUBELLA ANTIBODY: CPT | Performed by: OBSTETRICS & GYNECOLOGY

## 2018-07-09 PROCEDURE — 87389 HIV-1 AG W/HIV-1&-2 AB AG IA: CPT | Performed by: OBSTETRICS & GYNECOLOGY

## 2018-07-09 ASSESSMENT — ANXIETY QUESTIONNAIRES
2. NOT BEING ABLE TO STOP OR CONTROL WORRYING: NOT AT ALL
5. BEING SO RESTLESS THAT IT IS HARD TO SIT STILL: NOT AT ALL
6. BECOMING EASILY ANNOYED OR IRRITABLE: NOT AT ALL
1. FEELING NERVOUS, ANXIOUS, OR ON EDGE: NOT AT ALL
GAD7 TOTAL SCORE: 0
IF YOU CHECKED OFF ANY PROBLEMS ON THIS QUESTIONNAIRE, HOW DIFFICULT HAVE THESE PROBLEMS MADE IT FOR YOU TO DO YOUR WORK, TAKE CARE OF THINGS AT HOME, OR GET ALONG WITH OTHER PEOPLE: NOT DIFFICULT AT ALL
3. WORRYING TOO MUCH ABOUT DIFFERENT THINGS: NOT AT ALL
7. FEELING AFRAID AS IF SOMETHING AWFUL MIGHT HAPPEN: NOT AT ALL

## 2018-07-09 ASSESSMENT — PATIENT HEALTH QUESTIONNAIRE - PHQ9: 5. POOR APPETITE OR OVEREATING: NOT AT ALL

## 2018-07-09 NOTE — PROGRESS NOTES
28 year old  at about 8 wks by sure LMP presents for New OB appointment.  Estimated Date of Delivery:   by LMP.      Some nausea, no vomiting, no weight loss.    No vaginal bleeding, no leaking fluid, no contractions.      Electronic chart, including labs and imaging reviewed.  History of term SGA baby and  delivery.    Both the patient and her  are small patient per patient.      Last PHQ-9 score on record=   PHQ-9 SCORE 2018   Total Score MyChart -   Total Score 5       Obstetric History  Obstetric History       T1      L2     SAB0   TAB0   Ectopic0   Multiple0   Live Births2       # Outcome Date GA Lbr Chu/2nd Weight Sex Delivery Anes PTL Lv   4 Current            3  16 34w0d  4 lb 11 oz (2.126 kg) M    DESIREE      Complications:  premature rupture of membranes (PPROM) with onset of labor after 24 hours of rupture in third trimester, antepartum   2 Term 13 39w0d  5 lb 8 oz (2.495 kg) F   N DESIREE      Complications: SGA (small for gestational age)   1 AB                 History of spontaneous vaginal delivery at [facility] 13 at 39 wks, 5 lb 7.8 oz (SGA).  Female  History of PPROM at 33w 2d at [facility], IOL at 34 weeks --> spontaneous vaginal delivery 4# 11 oz  16  Male      Most Recent Immunizations   Administered Date(s) Administered     HepB 2004     Hib (PRP-T) 1992     Historical DTP/aP 1999     MMR 10/11/2001     OPV, trivalent, live 1992     Poliovirus, inactivated (IPV) 2001     TD (ADULT, 7+) 1999     Varicella 2001        Patient Active Problem List   Diagnosis     Normal labor     Pain in joint, multiple sites     Postoperative pain     Delivery normal     Vaginal bleeding     Vaginal discharge     TYREL (generalized anxiety disorder)     Mild major depression (H)     Panic attack       Current Outpatient Prescriptions   Medication     DULoxetine (CYMBALTA) 20 MG EC capsule  "    No current facility-administered medications for this visit.        Allergies   Allergen Reactions     Aminothiols      Amoxicillin      Penicillins        History  Past Medical History:   Diagnosis Date     Anxiety 2018     Depressive disorder      Pregnancy      Past Surgical History:   Procedure Laterality Date     GALLBLADDER SURGERY         Social History     Social History     Marital status:      Spouse name: N/A     Number of children: N/A     Years of education: N/A     Occupational History     Not on file.     Social History Main Topics     Smoking status: Never Smoker     Smokeless tobacco: Never Used     Alcohol use No     Drug use: No     Sexual activity: Yes     Partners: Male      Comment: pregnant     Other Topics Concern     Not on file     Social History Narrative       ROS - Please see HPI, otherwise 10pt ROS negative.      Physical Exam  Vitals: /76 (BP Location: Left arm, Patient Position: Chair, Cuff Size: Adult Regular)  Pulse 66  Ht 5' 2.32\" (1.583 m)  Wt 112 lb 8 oz (51 kg)  LMP 2018 (Exact Date)  BMI 20.36 kg/m2  BMI= Body mass index is 20.36 kg/(m^2).  Gen: Alert and oriented times 3, no acute distress.  Well developed, well nourished, pleasant.    Neck: Supple, no masses.  No thyromegaly.  Chest:  Non labored.  Clear to auscultation bilaterally.    Heart: Regular, normal S1, S2.  No murmurs.   Abdomen: Soft, nontender, nondistended.  No palpable masses.    :  Normal female external genitalia, Ayden stage V.  No lesions.  Speculum exam reveals a normal vaginal vault, normal cervix.  No abnormal discharge.  Bimanual exam reveals a normal, mobile, nontender uterus, size consistent with dates.  No cervical motion tenderness.  Adnexa nontender with no palpable masses.   Extremities:  Nontender, no edema.        Assessment and Plan:  28 year old  with IUP at 8 wks by LMP, which she is sure of.        ICD-10-CM    1. Supervision of other normal pregnancy, " antepartum Z34.80 CBC with Platelets     HIV Antigen Antibody Combo     Rubella Antibody IgG Quantitative     Hepatitis B surface antigen     Treponema Abs w Reflex to RPR and Titer     ABO/RH Type and Screen     Urine Culture Aerobic Bacterial     UA reflex to Microscopic     US OB < 14 Weeks w Transvaginal     CANCELED: US OB > 14 Weeks Complete Single     History of PPROM with IOL at 34 weeks.  Plan to refer to [facility] at about 16 weeks for CL and 17OHPC    Nausea - discussed supportive care.  She will let me know if her symptoms worsen.      Cymbalta for moderate to severe anxiety.  Will continue for now.      Autism in the FOB's brothers, also some other possible genetic abnormality    History of anemia in pregnancy    Discussed:    Genetic screening options:  Declined    Folder given - exercise, weight gain, schedule of visits, routine and indicated ultrasounds, prenatal vitamins and childbirth education.      Body mass index is 20.36 kg/(m^2). - recommend 25-35 lbs (BMI 18.5-24.9)    Return in 4 weeks for routine OB care      30 minutes was spent face to face with the patient today discussing her history, diagnosis, and follow-up plan as noted above.  Over 50% of the visit was spent in counseling and coordination of care.    Diane Dolan MD FACOG

## 2018-07-09 NOTE — MR AVS SNAPSHOT
After Visit Summary   7/9/2018    Lizzie Oconnell    MRN: [id number]           Patient Information     Date Of Birth          1990        Visit Information        Provider Department      7/9/2018 2:00 PM Diane Dolan MD [facility]        Today's Diagnoses     Supervision of other normal pregnancy, antepartum    -  1      Care Instructions      Treatment of Nausea and Vomiting in Pregnancy    Stop prenatal vitamin and start a folic acid supplement only until nausea improves (folic acid 400 micrograms by mouth daily)    Ginger capsules 250 mg by mouth daily     Consider acupressure with wrist bands    Vitamin B6 (pyridoxine) 10-25 mg by mouth 3-4 times per day.  This may be taken in combination with doxylamine.  Doxylamine 25 mg by mouth every night before bed.  You may also take doxylamine 12.5 mg (half tab) in the am and in the afternoon as needed.      It is important to stay hydrated.  Please let us know if your symptoms worsen or do not improve with the treatment plan listed above.                Follow-ups after your visit        Future tests that were ordered for you today     Open Future Orders        Priority Expected Expires Ordered    US OB > 14 Weeks Complete Single Routine  7/9/2019 7/9/2018            Who to contact     If you have questions or need follow up information about today's clinic visit or your schedule please contact [facility] directly at [phone].  Normal or non-critical lab and imaging results will be communicated to you by MyChart, letter or phone within 4 business days after the clinic has received the results. If you do not hear from us within 7 days, please contact the clinic through MyChart or phone. If you have a critical or abnormal lab result, we will notify you by phone as soon as possible.  Submit refill requests through [employer] or call your pharmacy and they will forward the refill request to us. Please allow 3  "business days for your refill to be completed.          Additional Information About Your Visit        MyChart Information     [employer] lets you send messages to your doctor, view your test results, renew your prescriptions, schedule appointments and more. To sign up, go to [email or web address] . Click on \"Log in\" on the left side of the screen, which will take you to the Welcome page. Then click on \"Sign up Now\" on the right side of the page.     You will be asked to enter the access code listed below, as well as some personal information. Please follow the directions to create your username and password.     Your access code is: [email or web address]  Expires: 10/7/2018  1:53 PM     Your access code will  in 90 days. If you need help or a new code, please call your [place] clinic or [phone].        Care EveryWhere ID     This is your [facility] EveryWhere ID. This could be used by other organizations to access your [place] medical records  [id number]        Your Vitals Were     Pulse Height Last Period BMI (Body Mass Index)          66 5' 2.32\" (1.583 m) 2018 (Exact Date) 20.36 kg/m2         Blood Pressure from Last 3 Encounters:   18 110/76   18 118/70   18 104/68    Weight from Last 3 Encounters:   18 112 lb 8 oz (51 kg)   18 108 lb (49 kg)   18 106 lb (48.1 kg)              We Performed the Following     ABO/RH Type and Screen     CBC with Platelets     Hepatitis B surface antigen     HIV Antigen Antibody Combo     Rubella Antibody IgG Quantitative     Treponema Abs w Reflex to RPR and Titer     UA reflex to Microscopic     Urine Culture Aerobic Bacterial          Today's Medication Changes          These changes are accurate as of 18  2:30 PM.  If you have any questions, ask your nurse or doctor.               Stop taking these medicines if you haven't already. Please contact your care team if you have questions.     ALPRAZolam 0.25 MG tablet   Commonly known as: "  XANAX   Stopped by:  Diane Dolan MD                    Primary Care Provider Office Phone # Fax #    DENNYS Camargo [phone]       [address] [place] [address]        Equal Access to Services     [facility]: Hadii aad ku hadasho Soomaali, waaxda luqadaha, qaybta kaalmada adeegyada, waxay idiin hayaan adeeg kharash lasebastiann . So [facility] [phone].    ATENCIÓN: Si habla español, tiene a schafer disposición servicios gratuitos de asistencia lingüística. Martiname al [phone].    We comply with applicable federal civil rights laws and [place] laws. We do not discriminate on the basis of race, color, national origin, age, disability, sex, sexual orientation, or gender identity.            Thank you!     Thank you for choosing [facility]  for your care. Our goal is always to provide you with excellent care. Hearing back from our patients is one way we can continue to improve our services. Please take a few minutes to complete the written survey that you may receive in the mail after your visit with us. Thank you!             Your Updated Medication List - Protect others around you: Learn how to safely use, store and throw away your medicines at www.disposemymeds.org.          This list is accurate as of 7/9/18  2:30 PM.  Always use your most recent med list.                   Brand Name Dispense Instructions for use Diagnosis    DULoxetine 20 MG EC capsule    CYMBALTA    60 capsule    Take 1 capsule (20 mg) by mouth daily    TYREL (generalized anxiety disorder)

## 2018-07-09 NOTE — PATIENT INSTRUCTIONS
Treatment of Nausea and Vomiting in Pregnancy    Stop prenatal vitamin and start a folic acid supplement only until nausea improves (folic acid 400 micrograms by mouth daily)    Ginger capsules 250 mg by mouth daily     Consider acupressure with wrist bands    Vitamin B6 (pyridoxine) 10-25 mg by mouth 3-4 times per day.  This may be taken in combination with doxylamine.  Doxylamine 25 mg by mouth every night before bed.  You may also take doxylamine 12.5 mg (half tab) in the am and in the afternoon as needed.      It is important to stay hydrated.  Please let us know if your symptoms worsen or do not improve with the treatment plan listed above.

## 2018-07-09 NOTE — NURSING NOTE
"Chief Complaint   Patient presents with     Prenatal Care     new prenatal       Initial /76 (BP Location: Left arm, Patient Position: Chair, Cuff Size: Adult Regular)  Pulse 66  Ht 5' 2.32\" (1.583 m)  Wt 112 lb 8 oz (51 kg)  LMP 2018 (Exact Date)  BMI 20.36 kg/m2 Estimated body mass index is 20.36 kg/(m^2) as calculated from the following:    Height as of this encounter: 5' 2.32\" (1.583 m).    Weight as of this encounter: 112 lb 8 oz (51 kg).  BP completed using cuff size: regular        Tia Fry, RAMIRO  2018             "

## 2018-07-10 ENCOUNTER — RADIANT APPOINTMENT (OUTPATIENT)
Dept: ULTRASOUND IMAGING | Facility: OTHER | Age: 28
End: 2018-07-10
Attending: OBSTETRICS & GYNECOLOGY
Payer: COMMERCIAL

## 2018-07-10 DIAGNOSIS — Z34.80 SUPERVISION OF OTHER NORMAL PREGNANCY, ANTEPARTUM: ICD-10-CM

## 2018-07-10 LAB
BACTERIA SPEC CULT: NO GROWTH
HBV SURFACE AG SERPL QL IA: NONREACTIVE
HIV 1+2 AB+HIV1 P24 AG SERPL QL IA: NONREACTIVE
Lab: NORMAL
RUBV IGG SERPL IA-ACNC: 11 IU/ML
SPECIMEN SOURCE: NORMAL
T PALLIDUM AB SER QL: NONREACTIVE

## 2018-07-10 PROCEDURE — 76801 OB US < 14 WKS SINGLE FETUS: CPT

## 2018-07-10 PROCEDURE — 76817 TRANSVAGINAL US OBSTETRIC: CPT

## 2018-07-10 ASSESSMENT — ANXIETY QUESTIONNAIRES: GAD7 TOTAL SCORE: 0

## 2018-07-12 ENCOUNTER — TELEPHONE (OUTPATIENT)
Dept: OBGYN | Facility: OTHER | Age: 28
End: 2018-07-12

## 2018-07-12 NOTE — TELEPHONE ENCOUNTER
LM asking patient to return our call.     When she calls back, please let her know her ultrasound was normal, but the baby was measuring closer to 7 weeks.    I recommend we use the due date by the Ultrasound 2/25/19.    I am happy to answer any questions she may have

## 2018-07-12 NOTE — TELEPHONE ENCOUNTER
Pt returned call,relayed below. Pt would like to speak with dimple as she has some additional questions regarding these results. Please advise

## 2018-07-12 NOTE — TELEPHONE ENCOUNTER
Patient actually had no questions about her US aside from wanting to keep her due date by LMP.    She had an episode of dizziness the other night and her left hand went numb.  This has resolved and she has no symptoms at this time.  I encouraged her to come in for evaluation if this happens again. She is only 7 weeks pregnant, so should not be pregnancy related.  It is reassuring that it resolved spontaneously.  Patient voiced understanding and had no further questions.   Hemoglobin   Date Value Ref Range Status   07/09/2018 12.6 11.7 - 15.7 g/dL Final

## 2018-07-24 ENCOUNTER — TELEPHONE (OUTPATIENT)
Dept: FAMILY MEDICINE | Facility: OTHER | Age: 28
End: 2018-07-24

## 2018-07-24 DIAGNOSIS — O21.9 NAUSEA/VOMITING IN PREGNANCY: Primary | ICD-10-CM

## 2018-07-24 RX ORDER — ONDANSETRON 4 MG/1
4 TABLET, FILM COATED ORAL EVERY 8 HOURS PRN
Qty: 30 TABLET | Refills: 0 | Status: SHIPPED | OUTPATIENT
Start: 2018-07-24 | End: 2018-12-10

## 2018-07-24 NOTE — TELEPHONE ENCOUNTER
Reason for call:  Medication  Reason for Call:  Medication or medication refill:    Do you use a [facility]?  [facility] [place]    Name of the medication requested: zofran 8mg every 8 hours as needed    Other request: pt has had it with 2 previous pregnancy     Can we leave a detailed message on this number? YES    Phone number patient can be reached at: Cell number on file:    Telephone Information:   Mobile [phone]       Best Time: anytime    Call taken on 7/24/2018 at 12:42 PM by Diane Chambers

## 2018-07-24 NOTE — TELEPHONE ENCOUNTER
Zofran 8mg tablet   Routing refill request to provider for review/approval because:  Drug not active on patient's medication list  Nancy Warren RN, BSN         '

## 2018-08-09 ENCOUNTER — PRENATAL OFFICE VISIT (OUTPATIENT)
Dept: OBGYN | Facility: OTHER | Age: 28
End: 2018-08-09
Payer: COMMERCIAL

## 2018-08-09 VITALS
HEART RATE: 74 BPM | SYSTOLIC BLOOD PRESSURE: 100 MMHG | DIASTOLIC BLOOD PRESSURE: 60 MMHG | BODY MASS INDEX: 20.68 KG/M2 | WEIGHT: 114.25 LBS

## 2018-08-09 DIAGNOSIS — Z87.59 HISTORY OF PRIOR PREGNANCY WITH SGA NEWBORN: ICD-10-CM

## 2018-08-09 DIAGNOSIS — O09.899 HISTORY OF PRETERM DELIVERY, CURRENTLY PREGNANT: ICD-10-CM

## 2018-08-09 PROCEDURE — 99207 ZZC COMPLICATED OB VISIT: CPT | Performed by: OBSTETRICS & GYNECOLOGY

## 2018-08-09 NOTE — PROGRESS NOTES
Presents for routine  appointment.    Nauseated, but that is improving. She is using Zofran occasionally.  No vomiting.   No LOF/VB/Ctxs.    ROS:   and GI  negative.     Please see Prenatal Vitals and Notes Flowsheet for objective data.    A/P:  28 year old  at 12w3d       ICD-10-CM    1. History of prior pregnancy with SGA  Z87.59    2. History of  delivery, currently pregnant O09.219          Discussed changing due date to that by US 7/10/18 at 7w 1d, GINGER 19 - Pt declined and would like to keep GINGER by LMP.    Hx PPROM --> PTD at 34 wks.  Plan 17OHP.  M referral placed.    History of term SGA baby, likely constitutional  Genetic screening declined  Moderate/severe anxiety - stable on cymbalta.  She has not tried anything else for this.  She is not willing to try a different medication and would just stop the cymbalta if needed.    Follow up in 4  weeks.      Diane Dolan MD

## 2018-08-09 NOTE — NURSING NOTE
"Chief Complaint   Patient presents with     Prenatal Care       Initial /60 (BP Location: Right arm, Patient Position: Chair, Cuff Size: Adult Regular)  Pulse 74  Wt 114 lb 4 oz (51.8 kg)  LMP 2018 (Exact Date)  BMI 20.68 kg/m2 Estimated body mass index is 20.68 kg/(m^2) as calculated from the following:    Height as of 18: 5' 2.32\" (1.583 m).    Weight as of this encounter: 114 lb 4 oz (51.8 kg).  BP completed using cuff size: regular        Tia Fry, RAMIRO  2018          "

## 2018-08-09 NOTE — MR AVS SNAPSHOT
After Visit Summary   2018    Lizzie Oconnell    MRN: [id number]           Patient Information     Date Of Birth          1990        Visit Information        Provider Department      2018 4:30 PM Diane Dolan MD [facility]        Today's Diagnoses     History of prior pregnancy with SGA         History of  delivery, currently pregnant           Follow-ups after your visit        Follow-up notes from your care team     Return in about 4 weeks (around 2018) for OB Visit.      Your next 10 appointments already scheduled     Sep 06, 2018  1:00 PM CDT   ESTABLISHED PRENATAL with Diane Dolan MD   [facility] ([facility]    [address] [place] [address] [phone]              Who to contact     If you have questions or need follow up information about today's clinic visit or your schedule please contact [facility] directly at [phone].  Normal or non-critical lab and imaging results will be communicated to you by MyChart, letter or phone within 4 business days after the clinic has received the results. If you do not hear from us within 7 days, please contact the clinic through Skemazhart or phone. If you have a critical or abnormal lab result, we will notify you by phone as soon as possible.  Submit refill requests through [email or web address] or call your pharmacy and they will forward the refill request to us. Please allow 3 business days for your refill to be completed.          Additional Information About Your Visit        Care EveryWhere ID     This is your Care EveryWhere ID. This could be used by other organizations to access your [place] medical records  [id number]        Your Vitals Were     Pulse Last Period BMI (Body Mass Index)             74 2018 (Exact Date) 20.68 kg/m2          Blood Pressure from Last 3 Encounters:   18 100/60   18 110/76   18 118/70     Weight from Last 3 Encounters:   08/09/18 114 lb 4 oz (51.8 kg)   07/09/18 112 lb 8 oz (51 kg)   04/18/18 108 lb (49 kg)              Today, you had the following     No orders found for display       Primary Care Provider Office Phone # Fax #    [place] Camargo [phone]       [address]  [facility] [address]        Equal Access to Services     LORE REES : Hadii aad ku hadasho Soomaali, waaxda luqadaha, qaybta kaalmada adeegyada, waxay idiin hayaan adeeg kharash la'donte . So [facility] [phone].    ATENCIÓN: Si habla español, tiene a schafer disposición servicios gratuitos de asistencia lingüística. Ll[facility] [phone].    We comply with applicable federal civil rights laws and [place] laws. We do not discriminate on the basis of race, color, national origin, age, disability, sex, sexual orientation, or gender identity.            Thank you!     Thank you for choosing [facility]  for your care. Our goal is always to provide you with excellent care. Hearing back from our patients is one way we can continue to improve our services. Please take a few minutes to complete the written survey that you may receive in the mail after your visit with us. Thank you!             Your Updated Medication List - Protect others around you: Learn how to safely use, store and throw away your medicines at www.disposemymeds.org.          This list is accurate as of 8/9/18  5:03 PM.  Always use your most recent med list.                   Brand Name Dispense Instructions for use Diagnosis    DULoxetine 20 MG EC capsule    CYMBALTA    60 capsule    Take 1 capsule (20 mg) by mouth daily    TYREL (generalized anxiety disorder)       ondansetron 4 MG tablet    ZOFRAN    30 tablet    Take 1 tablet (4 mg) by mouth every 8 hours as needed for nausea    Nausea/vomiting in pregnancy

## 2018-08-10 ENCOUNTER — TELEPHONE (OUTPATIENT)
Dept: OBGYN | Facility: OTHER | Age: 28
End: 2018-08-10

## 2018-08-10 NOTE — TELEPHONE ENCOUNTER
LM asking patient to return our call.    When she calls back please let her know that I talked with someone at [facility] an she can continue the Cymbalta.

## 2018-08-28 DIAGNOSIS — F41.1 GAD (GENERALIZED ANXIETY DISORDER): ICD-10-CM

## 2018-08-29 NOTE — TELEPHONE ENCOUNTER
"Requested Prescriptions   Pending Prescriptions Disp Refills     DULoxetine (CYMBALTA) 20 MG EC capsule [Pharmacy Med Name: DULOXETINE HCL DR 20 MG CAP] 60 capsule 1     Sig: TAKE 1 CAPSULE (20 MG) BY MOUTH DAILY    Serotonin-Norepinephrine Reuptake Inhibitors  Failed    8/28/2018  1:59 AM       Failed - No active pregnancy on record       Passed - Blood pressure under 140/90 in past 12 months    BP Readings from Last 3 Encounters:   08/09/18 100/60   07/09/18 110/76   04/18/18 118/70          Passed - Recent (12 mo) or future (30 days) visit within the authorizing provider's specialty    Patient had office visit in the last 12 months or has a visit in the next 30 days with authorizing provider or within the authorizing provider's specialty.  See \"Patient Info\" tab in inbasket, or \"Choose Columns\" in Meds & Orders section of the refill encounter.           Passed - Patient is age 18 or older       Passed - No positive pregnancy test in past 12 months        DULoxetine (CYMBALTA) 20 MG EC capsule  Routing refill request to provider for review/approval because:  Patient is pregnant, known will be on medication and wean prior to delivery    Nancy Warren, RN, BSN           "

## 2018-08-30 RX ORDER — DULOXETIN HYDROCHLORIDE 20 MG/1
CAPSULE, DELAYED RELEASE ORAL
Qty: 60 CAPSULE | Refills: 0 | Status: SHIPPED | OUTPATIENT
Start: 2018-08-30 | End: 2018-10-25

## 2018-09-04 ENCOUNTER — TRANSFERRED RECORDS (OUTPATIENT)
Dept: HEALTH INFORMATION MANAGEMENT | Facility: CLINIC | Age: 28
End: 2018-09-04

## 2018-09-06 ENCOUNTER — PRENATAL OFFICE VISIT (OUTPATIENT)
Dept: OBGYN | Facility: OTHER | Age: 28
End: 2018-09-06
Payer: COMMERCIAL

## 2018-09-06 ENCOUNTER — TELEPHONE (OUTPATIENT)
Dept: OBGYN | Facility: OTHER | Age: 28
End: 2018-09-06

## 2018-09-06 VITALS
WEIGHT: 117.5 LBS | DIASTOLIC BLOOD PRESSURE: 60 MMHG | HEART RATE: 80 BPM | BODY MASS INDEX: 21.27 KG/M2 | SYSTOLIC BLOOD PRESSURE: 110 MMHG

## 2018-09-06 DIAGNOSIS — F41.1 GAD (GENERALIZED ANXIETY DISORDER): ICD-10-CM

## 2018-09-06 DIAGNOSIS — O09.899 HISTORY OF PRETERM DELIVERY, CURRENTLY PREGNANT: Primary | ICD-10-CM

## 2018-09-06 PROCEDURE — 99207 ZZC COMPLICATED OB VISIT: CPT | Performed by: OBSTETRICS & GYNECOLOGY

## 2018-09-06 RX ORDER — HYDROXYPROGESTERONE CAPROATE 250 MG/ML
250 INJECTION INTRAMUSCULAR
Qty: 4 ML | Refills: 5 | Status: SHIPPED | OUTPATIENT
Start: 2018-09-06 | End: 2018-09-10

## 2018-09-06 NOTE — MR AVS SNAPSHOT
After Visit Summary   2018    Lizzie Oconnell    MRN: [id number]           Patient Information     Date Of Birth          1990        Visit Information        Provider Department      2018 1:00 PM Diane Dolan MD [facility]        Today's Diagnoses     History of  delivery, currently pregnant    -  1    TYREL (generalized anxiety disorder)           Follow-ups after your visit        Follow-up notes from your care team     Return in about 4 weeks (around 10/4/2018).      Your next 10 appointments already scheduled     Oct 04, 2018  3:30 PM CDT   ESTABLISHED PRENATAL with Diane Dolan MD   [facility] ([facility]    [address] [facility] [address] [phone]              Who to contact     If you have questions or need follow up information about today's clinic visit or your schedule please contact [facility] directly at [phone].  Normal or non-critical lab and imaging results will be communicated to you by MyChart, letter or phone within 4 business days after the clinic has received the results. If you do not hear from us within 7 days, please contact the clinic through MyChart or phone. If you have a critical or abnormal lab result, we will notify you by phone as soon as possible.  Submit refill requests through [employer]ize or call your pharmacy and they will forward the refill request to us. Please allow 3 business days for your refill to be completed.          Additional Information About Your Visit        Care EveryWhere ID     This is your Care EveryWhere ID. This could be used by other organizations to access your [place] medical records  [id number]        Your Vitals Were     Pulse Last Period BMI (Body Mass Index)             80 2018 (Exact Date) 21.27 kg/m2          Blood Pressure from Last 3 Encounters:   18 110/60   18 100/60   18 110/76    Weight from Last 3  Encounters:   18 117 lb 8 oz (53.3 kg)   18 114 lb 4 oz (51.8 kg)   18 112 lb 8 oz (51 kg)              Today, you had the following     No orders found for display         Today's Medication Changes          These changes are accurate as of 18  1:19 PM.  If you have any questions, ask your nurse or doctor.               Start taking these medicines.        Dose/Directions    hydroxyprogesterone caproate in oil 250 mg/mL IM injection   Commonly known as:  alcohol free--compound   Used for:  History of  delivery, currently pregnant   Started by:  Diane Dolan MD        Dose:  250 mg   Inject 1 mL (250 mg) into the muscle every 7 days Please dispense in 1 mL vials.   Quantity:  4 vial   Refills:  5            Where to get your medicines      These medications were sent to [place] [address] [facility] - [place], [place] - [address]  [address] [facility] [address]     Phone:  [phone]     hydroxyprogesterone caproate in oil 250 mg/mL IM injection                Primary Care Provider Office Phone # Fax #    Belley[facility] [place] Bronson Corre[place], [place] [phone] [address] [place] [address]        Equal Access to Services     LORE REES AH: Hadii isabella ku hadasho Soomaali, waaxda luqadaha, qaybta kaalmada adeegyada, waxay felecia haydonte finnegan. So [facility] [phone].    ATENCIÓN: Si habla español, tiene a schafer disposición servicios gratuitos de asistencia lingüística. Llame al [phone].    We comply with applicable federal civil rights laws and [place] laws. We do not discriminate on the basis of race, color, national origin, age, disability, sex, sexual orientation, or gender identity.            Thank you!     Thank you for choosing [facility]  for your care. Our goal is always to provide you with excellent care. Hearing back from our patients is one way we can continue to improve our services. Please take a few minutes to complete the  written survey that you may receive in the mail after your visit with us. Thank you!             Your Updated Medication List - Protect others around you: Learn how to safely use, store and throw away your medicines at www.disposemymeds.org.          This list is accurate as of 18  1:19 PM.  Always use your most recent med list.                   Brand Name Dispense Instructions for use Diagnosis    DULoxetine 20 MG EC capsule    CYMBALTA    60 capsule    TAKE 1 CAPSULE (20 MG) BY MOUTH DAILY    TYREL (generalized anxiety disorder)       hydroxyprogesterone caproate in oil 250 mg/mL IM injection    alcohol free--compound    4 vial    Inject 1 mL (250 mg) into the muscle every 7 days Please dispense in 1 mL vials.    History of  delivery, currently pregnant       ondansetron 4 MG tablet    ZOFRAN    30 tablet    Take 1 tablet (4 mg) by mouth every 8 hours as needed for nausea    Nausea/vomiting in pregnancy

## 2018-09-06 NOTE — TELEPHONE ENCOUNTER
RN spoke with [facility] ([phone]) and informed this is now considered a commercial medication and can be sent to pharmacy. RN called local pharmacy and informed they are unable to do this medication as it is a compound. RN LM for [facility] to discuss alternative options further. Nancy Warren RN, BSN

## 2018-09-06 NOTE — TELEPHONE ENCOUNTER
Katelyn at [facility] was informed. She will let her pharmacist know. We do not need to do anything further at this time and they will take it from here. They will call back if any questions arise.     Adriana Linares, RN, BSN

## 2018-09-06 NOTE — NURSING NOTE
"Chief Complaint   Patient presents with     Prenatal Care       Initial /60 (BP Location: Left arm, Patient Position: Chair, Cuff Size: Adult Regular)  Pulse 80  Wt 117 lb 8 oz (53.3 kg)  LMP 2018 (Exact Date)  BMI 21.27 kg/m2 Estimated body mass index is 21.27 kg/(m^2) as calculated from the following:    Height as of 18: 5' 2.32\" (1.583 m).    Weight as of this encounter: 117 lb 8 oz (53.3 kg).  BP completed using cuff size: regular        Tia Fry, RAMIRO  2018           "

## 2018-09-06 NOTE — TELEPHONE ENCOUNTER
[facility] received a script for hydroxyprogesterone caproate in oil 250 mg/mL (ALCOHOL FREE--COMPOUND) IM injection, was this sent to them by accident? They do not do this at that pharmacy.

## 2018-09-06 NOTE — PROGRESS NOTES
Presents for routine  appointment.     No complaints.    No LOF/VB/Ctxs.    ROS:   and GI  negative.     Please see Prenatal Vitals and Notes Flowsheet for objective data.  MFM US reviewed.      A/P:  28 year old  at 16w3d       ICD-10-CM    1. History of  delivery, currently pregnant O09.219 hydroxyprogesterone caproate in oil 250 mg/mL (ALCOHOL FREE--COMPOUND) IM injection   2. TYREL (generalized anxiety disorder) F41.1        History of PPROM --> PTD.  Plan to start weekly 17-OHP.  Normal CL 18.  Plan repeat CL with anatomy scan at about 20 wks with [facility]   ASA 81 mg due to h x of nonspecified connective tissue disorder  TYREL/Depression stable on Cymbalta   Follow up in 4  week(s).      Diane Dolan MD

## 2018-09-06 NOTE — TELEPHONE ENCOUNTER
hydroxyprogesterone caproate in oil 250 mg/mL (ALCOHOL FREE--COMPOUND) IM injection 4 vial 5 9/6/2018  --   Sig - Route: Inject 1 mL (250 mg) into the muscle every 7 days Please dispense in 1 mL vials. - Intramuscular   Class: E-Prescribe   Notes to Pharmacy: Please deliver to [facility] ([address] [facility])     Phone call to pharmacy and spoke to Lynne. She verified that  [facility] does still do a PA on [place] Rx's and need to have the Rx sent to that pharmacy instead of local [facility].   Unsure why Dr. Dolan ordered Alcohol free compound.   Pended [facility] Rx for Dr. Dolan's approval.   Phone call to [facility] pharmacy and cancelled the Rx that was sent incorrectly.    Will route to Dr. Dolan for review & orders. Freda Ramirez RN, BAN

## 2018-09-10 ENCOUNTER — TELEPHONE (OUTPATIENT)
Dept: OBGYN | Facility: OTHER | Age: 28
End: 2018-09-10

## 2018-09-10 DIAGNOSIS — O09.899 HISTORY OF PRETERM DELIVERY, CURRENTLY PREGNANT: ICD-10-CM

## 2018-09-10 RX ORDER — HYDROXYPROGESTERONE CAPROATE 250 MG/ML
250 INJECTION INTRAMUSCULAR
Qty: 4 ML | Refills: 5 | Status: SHIPPED | OUTPATIENT
Start: 2018-09-10 | End: 2018-11-20

## 2018-09-10 NOTE — TELEPHONE ENCOUNTER
Phone call placed to  [facility].  Spoke to Ann. Verified [facility] Rx needs to be resent with MD's signature. Sun Mccain RN on 9/10/2018 at 3:43 PM

## 2018-09-10 NOTE — TELEPHONE ENCOUNTER
Reason for Call: Request for an order or referral:    Order or referral being requested: progesterone inj    Date needed: as soon as possible    Has the patient been seen by the PCP for this problem? YES    Additional comments: will need order. Call when order in to schedule.    Phone number Patient can be reached at:  Home number on file [phone] (home)    Best Time:  any    Can we leave a detailed message on this number?  YES    Call taken on 9/10/2018 at 1:34 PM by Lyla Mcdaniel

## 2018-09-10 NOTE — TELEPHONE ENCOUNTER
Phone call to  Specialty Pharmacy- they state they never received the [facility] prescription sent by Dr. Dolan on 9/6/18.  Informed Pharmacy of notes in 9/6 telephone encounter- they are unsure why the prescription was not received.  Was asked to give a verbal order to the pharmacist- however- will need to OB RN call back with this.

## 2018-09-10 NOTE — TELEPHONE ENCOUNTER
Please let the patient know we are still working on this and will get back to her as soon as possible.

## 2018-09-11 ENCOUNTER — TELEPHONE (OUTPATIENT)
Dept: OBGYN | Facility: OTHER | Age: 28
End: 2018-09-11

## 2018-09-11 NOTE — TELEPHONE ENCOUNTER
[facility] Prior Authorization Team   Phone: [phone]  Fax: [phone]    PA Initiation    Medication: HYDROXYprogesterone caproate in oil 250 mg/mL - PA Pending  Insurance Company: [facility] [employer] - Phone [phone] Fax [phone]:  [facility]  Pharmacy Filling the Rx: [facility] - [address] [place] [address]  Filling Pharmacy Phone:    Filling Pharmacy Fax:    Start Date: 9/11/2018

## 2018-09-12 NOTE — TELEPHONE ENCOUNTER
Prior Authorization Approval    Authorization Effective Date: 9/11/2018  Authorization Expiration Date: 1/29/2019  Medication: HYDROXYprogesterone caproate in oil 250 mg/mL - PA Approved  Approved Dose/Quantity: 4ml per 28 days  Reference #: [id number]   Insurance Company: [employer] - Phone [phone] Fax [phone]:  [facility]  Expected CoPay: N/A     CoPay Card Available:      Foundation Assistance Needed: N/A  Which Pharmacy is filling the prescription (Not needed for infusion/clinic administered): [place], [place] - [address] [place] [address]  Pharmacy Notified: Yes  Patient Notified: Yes

## 2018-09-20 NOTE — TELEPHONE ENCOUNTER
Received fax from [facility] on patient's Hydroxyprogesterone 250 mg prescription.  Fax dated 9/20/18 [facility]- Formulary Exception/ Prior Authorization request.  Forwarded to PA pool to advise- as it appears the PA was approved below?

## 2018-09-25 ENCOUNTER — TELEPHONE (OUTPATIENT)
Dept: OBGYN | Facility: OTHER | Age: 28
End: 2018-09-25

## 2018-09-25 NOTE — TELEPHONE ENCOUNTER
Reason for Call:  Medication or medication refill:    Do you use a [facility]?  Name of the pharmacy and phone number for the current request:  [facility] pharmacy    Name of the medication requested: sancho    Other request: please send rx to [facility]  Fax [phone]    Can we leave a detailed message on this number? YES    Phone number patient can be reached at: Other phone number:  [phone]*    Best Time: any    Call taken on 9/25/2018 at 3:14 PM by Kimber Alarcon

## 2018-09-26 NOTE — TELEPHONE ENCOUNTER
This has already been ordered.  RN please fax to pharmacy as requested.  Ok for verbal order if it needs to be reordered.  This has been in process for weeks now, so please assist the patient asap.

## 2018-09-26 NOTE — TELEPHONE ENCOUNTER
Called placed to [facility].  Verbal [facility] order, NPI number and [facility] address given to Daniella at [facility]. Informed it needed to be ASAP.  Daniella stated it will be filled and call will be placed to pt when it is ready. Sun Mccain RN on 9/26/2018 at 12:24 PM

## 2018-10-02 ENCOUNTER — TELEPHONE (OUTPATIENT)
Dept: OBGYN | Facility: CLINIC | Age: 28
End: 2018-10-02

## 2018-10-02 ENCOUNTER — TRANSFERRED RECORDS (OUTPATIENT)
Dept: HEALTH INFORMATION MANAGEMENT | Facility: CLINIC | Age: 28
End: 2018-10-02

## 2018-10-02 NOTE — TELEPHONE ENCOUNTER
"Phone call from Dr. Monaco stating she was seen in clinic today and discussed starting [facility].   Phone call to [facility] at [phone] and spoke with Alida. She was not able to get an update on [facility] and suggested this RN contact [facility] at [phone].   Phone call to [facility] and spoke to Belle. She noted that [facility] orders have been received but they not contacted patient to get her approval to ship the medication.   Belle called patient and discussed insurance coverage with patient. Belle stated patient's insurance has a $200 copay and that is \"more than she can afford.\" Belle gave patient the [facility] assistance phone number and encouraged patient to contact them. Belle thinks patient will call clinic if she decides she can afford med. Explained patient has appt with Dr. Dolan on 10-04-18 and she can discuss at that appt.   Will route to Dr. Dolan as an FYI. Appt notes edited to discuss [facility]. Freda Ramirez RN, BAN      "

## 2018-10-03 ENCOUNTER — TELEPHONE (OUTPATIENT)
Dept: OBGYN | Facility: OTHER | Age: 28
End: 2018-10-03

## 2018-10-03 NOTE — TELEPHONE ENCOUNTER
Reason for Call:  Medication or medication refill:    Do you use a [facility]?  Name of the pharmacy and phone number for the current request:  [facility] specialty pharmacy [phone] option [phone] fax [phone]    Name of the medication requested: sancho    Other request: pharmacy requesting rx for sancho. Needs the brand not generic of this due to co pay assistance    Can we leave a detailed message on this number? YES    Phone number patient can be reached at: Home number on file [phone] (home)    Best Time: any    Call taken on 10/3/2018 at 10:49 AM by Lyla Mcdaniel

## 2018-10-04 ENCOUNTER — PRENATAL OFFICE VISIT (OUTPATIENT)
Dept: OBGYN | Facility: OTHER | Age: 28
End: 2018-10-04
Payer: COMMERCIAL

## 2018-10-04 VITALS
DIASTOLIC BLOOD PRESSURE: 64 MMHG | HEART RATE: 76 BPM | WEIGHT: 122 LBS | SYSTOLIC BLOOD PRESSURE: 102 MMHG | BODY MASS INDEX: 22.08 KG/M2

## 2018-10-04 DIAGNOSIS — O09.899 HISTORY OF PRETERM DELIVERY, CURRENTLY PREGNANT: ICD-10-CM

## 2018-10-04 DIAGNOSIS — Z87.59 HISTORY OF PRIOR PREGNANCY WITH SGA NEWBORN: Primary | ICD-10-CM

## 2018-10-04 PROCEDURE — 99207 ZZC PRENATAL VISIT: CPT | Performed by: OBSTETRICS & GYNECOLOGY

## 2018-10-04 ASSESSMENT — PATIENT HEALTH QUESTIONNAIRE - PHQ9
SUM OF ALL RESPONSES TO PHQ QUESTIONS 1-9: 7
10. IF YOU CHECKED OFF ANY PROBLEMS, HOW DIFFICULT HAVE THESE PROBLEMS MADE IT FOR YOU TO DO YOUR WORK, TAKE CARE OF THINGS AT HOME, OR GET ALONG WITH OTHER PEOPLE: SOMEWHAT DIFFICULT
SUM OF ALL RESPONSES TO PHQ QUESTIONS 1-9: 7

## 2018-10-04 NOTE — NURSING NOTE
"Chief Complaint   Patient presents with     Prenatal Care       Initial /64 (BP Location: Left arm, Patient Position: Chair, Cuff Size: Adult Regular)  Pulse 76  Wt 122 lb (55.3 kg)  LMP 2018 (Exact Date)  BMI 22.08 kg/m2 Estimated body mass index is 22.08 kg/(m^2) as calculated from the following:    Height as of 18: 5' 2.32\" (1.583 m).    Weight as of this encounter: 122 lb (55.3 kg).  BP completed using cuff size: regular        Tia Fry, RAMIRO  2018          "

## 2018-10-04 NOTE — PROGRESS NOTES
Presents for routine  appointment.    She has had some cramping.  Started about 3 weeks ago for two days.  It has been off and on since.  It will usually last all day, but not really intense.  It is achy.  Full bladder makes it worse.  No symptoms of UTI.  No abnormal discharge.  Normal fetal movements.    No LOF/VB/Ctxs.    ROS:   and GI  negative.     Please see Prenatal Vitals and Notes Flowsheet for objective data.    A/P:  28 year old  at 20w3d       ICD-10-CM    1. History of prior pregnancy with SGA  Z87.59 OB hemoglobin: FUTURE     Glucose Challenge Test (GCT)1 Hour, (Future)     Treponema Abs w Reflex to RPR and Titer   2. History of  delivery, currently pregnant O09.219        Patient has not received the 17OHP yet due to insurance problems.  iCare placed.  I will contact her when we get the medication.    MFM results reviewed. Cervix is closed.  CL 31.3 mm.  This is still considered normal but shorter.  Repeat planned in two weeks.   GCT, hgb and anti-treponema testing  after 24 weeks   Follow up in 4  weeks.      Diane Dolan MD        Answers for HPI/ROS submitted by the patient on 10/4/2018   If you checked off any problems, how difficult have these problems made it for you to do your work, take care of things at home, or get along with other people?: Somewhat difficult  PHQ9 TOTAL SCORE: 7    MFM Impression 10/2/18  Impression  =========    1) Intrauterine pregnancy at 20 1/7 weeks gestational age.  2) None of the anomalies commonly detected by ultrasound were evident in the detailed fetal anatomic survey described above.  3) Growth parameters and estimated fetal weight were consistent with an appropriate for gestation age pattern of growth.  4) The amniotic fluid volume appeared normal.  5) The cervix appeared long and closed and without funneling by transvaginal imaging.    Recommendation  ==============    We discussed the findings on today's ultrasound with  the patient.    Lizzie reports that she desires 17-OH progesterone treatment, but that she has not yet received the medication. She reports that she last talked with the specialty  pharmacy approximately 3 weeks ago and was told that they would be calling her providers office for a prescription. She wishes to start the medication and is frustrated  with the process of trying to obtain the medication. I called Dr. Dolan's nurse and conveyed the difficulties that Lizzie has been experiencing obtaining 17-OH  progesterone and asked if they could follow up and assist Lizzie further.    A repeat ultrasound has been scheduled here in 2 weeks to reevaluate the cervical length given the history of  birth.    Return to primary provider for continued prenatal care.    Thank you for the opportunity to participate in the care of this patient. If you have questions regarding today's evaluation or if we can be of further service, please contact the  [facility].    **Fetal anomalies may be present but not detected**.

## 2018-10-04 NOTE — TELEPHONE ENCOUNTER
Called [facility] specialty pharmacy and gave a verbal order for the [facility].  They state they are going to get that shipped out asa.  They needed the verbal order to make sure the 5 mL vials were ok ([facility] brand is not available in the 1 mL vials).

## 2018-10-04 NOTE — MR AVS SNAPSHOT
After Visit Summary   10/4/2018    Lizzie Oconnell    MRN: [id number]           Patient Information     Date Of Birth          1990        Visit Information        Provider Department      10/4/2018 3:30 PM Diane Dolan MD [facility]        Today's Diagnoses     History of prior pregnancy with SGA     -  1    History of  delivery, currently pregnant           Follow-ups after your visit        Follow-up notes from your care team     Return in about 4 weeks (around 2018) for OB Visit.      Who to contact     If you have questions or need follow up information about today's clinic visit or your schedule please contact [facility] directly at [phone].  Normal or non-critical lab and imaging results will be communicated to you by MyChart, letter or phone within 4 business days after the clinic has received the results. If you do not hear from us within 7 days, please contact the clinic through MyChart or phone. If you have a critical or abnormal lab result, we will notify you by phone as soon as possible.  Submit refill requests through [employer] or call your pharmacy and they will forward the refill request to us. Please allow 3 business days for your refill to be completed.          Additional Information About Your Visit        Care EveryWhere ID     This is your Care EveryWhere ID. This could be used by other organizations to access your [facility] medical records  [id number]        Your Vitals Were     Pulse Last Period BMI (Body Mass Index)             76 2018 (Exact Date) 22.08 kg/m2          Blood Pressure from Last 3 Encounters:   10/04/18 102/64   18 110/60   18 100/60    Weight from Last 3 Encounters:   10/04/18 122 lb (55.3 kg)   18 117 lb 8 oz (53.3 kg)   18 114 lb 4 oz (51.8 kg)               Primary Care Provider Office Phone # Fax #    [place] [phone]        [address] [place] [address]        Equal Access to Services     LORE REES : Hadii isabella Flores, arnol pulido, louann lundy, amina finnegan. So [facility] [phone].    ATENCIÓN: Si habla español, tiene a schafer disposición servicios gratuitos de asistencia lingüística. Ll[facility] [phone].    We comply with applicable federal civil rights laws and [place] laws. We do not discriminate on the basis of race, color, national origin, age, disability, sex, sexual orientation, or gender identity.            Thank you!     Thank you for choosing [facility]  for your care. Our goal is always to provide you with excellent care. Hearing back from our patients is one way we can continue to improve our services. Please take a few minutes to complete the written survey that you may receive in the mail after your visit with us. Thank you!             Your Updated Medication List - Protect others around you: Learn how to safely use, store and throw away your medicines at www.disposemymeds.org.          This list is accurate as of 10/4/18 11:59 PM.  Always use your most recent med list.                   Brand Name Dispense Instructions for use Diagnosis    DULoxetine 20 MG EC capsule    CYMBALTA    60 capsule    TAKE 1 CAPSULE (20 MG) BY MOUTH DAILY    TYREL (generalized anxiety disorder)       HYDROXYprogesterone caproate in oil 250 mg/mL intramuscular injection    AKUA    4 mL    Inject 1 mL (250 mg) into the muscle every 7 days    History of  delivery, currently pregnant       ondansetron 4 MG tablet    ZOFRAN    30 tablet    Take 1 tablet (4 mg) by mouth every 8 hours as needed for nausea    Nausea/vomiting in pregnancy       PRENATAL VITAMINS PO

## 2018-10-05 ENCOUNTER — TELEPHONE (OUTPATIENT)
Dept: OBGYN | Facility: OTHER | Age: 28
End: 2018-10-05

## 2018-10-05 ASSESSMENT — PATIENT HEALTH QUESTIONNAIRE - PHQ9: SUM OF ALL RESPONSES TO PHQ QUESTIONS 1-9: 7

## 2018-10-05 NOTE — TELEPHONE ENCOUNTER
Per [facility] pharmacy- they received the patient's copayment amount this morning.  Once the payment is processed- they will contact patient to verify they can ship to ER clinic.  [facility] will then contact clinic to verify shipping information.  Asked to put a rush on this- and they state this should be done by end of business day today.   FYI to provider.

## 2018-10-05 NOTE — TELEPHONE ENCOUNTER
Please call Pharmacy to find out if they have shipped the [place] yet and when we can expect it at our [facility].      [facility] [phone] option [phone] fax [phone]

## 2018-10-08 NOTE — TELEPHONE ENCOUNTER
Phone call placed to [facility].  Per Maddy GARCIA Certified Pharmacy Tech, mail date for [facility] is today 10/8/2018. Sun Mccain RN on 10/8/2018 at 12:09 PM

## 2018-10-09 ENCOUNTER — ALLIED HEALTH/NURSE VISIT (OUTPATIENT)
Dept: FAMILY MEDICINE | Facility: OTHER | Age: 28
End: 2018-10-09
Payer: COMMERCIAL

## 2018-10-09 DIAGNOSIS — O09.899 HISTORY OF PRETERM DELIVERY, CURRENTLY PREGNANT: Primary | ICD-10-CM

## 2018-10-09 PROCEDURE — 96372 THER/PROPH/DIAG INJ SC/IM: CPT

## 2018-10-09 NOTE — NURSING NOTE
MEDICATION: [place] 250 MG/ML  ROUTE: IM  SITE: Ventrogluteal - Left  DOSE: 1 ML  LOT #: [id number]  :  [employer]  EXPIRATION DATE:  4/2020  NDC: 38488-782-65    Prior to injection verified patient identity using patient's name and date of birth.  Due to injection administration, patient instructed to remain in clinic for 15 minutes  afterwards, and to report any adverse reaction to me immediately.    Patient has own [facility] medication in our med room locked up.  In a large plastic bag with patient's label on it.  Needs to be drawn up (1 ML) each time out of a multi dose vial.      Dona Blanco CMA (AKOSUA)

## 2018-10-09 NOTE — MR AVS SNAPSHOT
After Visit Summary   10/9/2018    Lizzie Oconnell    MRN: [id number]           Patient Information     Date Of Birth          1990        Visit Information        Provider Department      10/9/2018 1:30 PM JAY JAY CORRALES TEAM B, [facility]        Today's Diagnoses     History of  delivery, currently pregnant    -  1       Follow-ups after your visit        Your next 10 appointments already scheduled     Oct 16, 2018 11:30 AM CDT   Nurse Only with JAY JAY CORRALES TEAM DAVID, [facility] ([facility])    [address]  [facility] [id number] [phone]            2018  3:30 PM CST   ESTABLISHED PRENATAL with Diane Dolna [facility]   [facility]    [address] [id number] [phone]              Who to contact     If you have questions or need follow up information about today's clinic visit or your schedule please contact [facility] directly at [phone].  Normal or non-critical lab and imaging results will be communicated to you by MyChart, letter or phone within 4 business days after the clinic has received the results. If you do not hear from us within 7 days, please contact the clinic through MyChart or phone. If you have a critical or abnormal lab result, we will notify you by phone as soon as possible.  Submit refill requests through [employer] or call your pharmacy and they will forward the refill request to us. Please allow 3 business days for your refill to be completed.          Additional Information About Your Visit        Care EveryWhere ID     This is your Care EveryWhere ID. This could be used by other organizations to access your [facility] medical records  [id number]        Your Vitals Were     Last Period                   2018 (Exact Date)            Blood Pressure from Last 3 Encounters:   10/04/18 102/64   18 110/60    18 100/60    Weight from Last 3 Encounters:   10/04/18 122 lb (55.3 kg)   18 117 lb 8 oz (53.3 kg)   18 114 lb 4 oz (51.8 kg)              We Performed the Following     INJECTION INTRAMUSCULAR OR SUB-Q        Primary Care Provider Office Phone # Fax #    [place], [place] [phone]       [address] [place] [address]        Equal Access to Services     LORE REES : Hadii aad ku hadasho Soomaali, waaxda luqadaha, qaybta kaalmada adeegyada, waxay idiin hayaan adeeg kharash susy . So [facility] [phone].    ATENCIÓN: Si habla español, tiene a schafer disposición servicios gratuitos de asistencia lingüística. Ll[facility] [phone].    We comply with applicable federal civil rights laws and [place] laws. We do not discriminate on the basis of race, color, national origin, age, disability, sex, sexual orientation, or gender identity.            Thank you!     Thank you for choosing [facility]  for your care. Our goal is always to provide you with excellent care. Hearing back from our patients is one way we can continue to improve our services. Please take a few minutes to complete the written survey that you may receive in the mail after your visit with us. Thank you!             Your Updated Medication List - Protect others around you: Learn how to safely use, store and throw away your medicines at www.disposemymeds.org.          This list is accurate as of 10/9/18  2:10 PM.  Always use your most recent med list.                   Brand Name Dispense Instructions for use Diagnosis    DULoxetine 20 MG EC capsule    CYMBALTA    60 capsule    TAKE 1 CAPSULE (20 MG) BY MOUTH DAILY    TYREL (generalized anxiety disorder)       HYDROXYprogesterone caproate in oil 250 mg/mL intramuscular injection    AKUA    4 mL    Inject 1 mL (250 mg) into the muscle every 7 days    History of  delivery, currently pregnant       ondansetron 4 MG tablet     ZOFRAN    30 tablet    Take 1 tablet (4 mg) by mouth every 8 hours as needed for nausea    Nausea/vomiting in pregnancy       PRENATAL VITAMINS PO

## 2018-10-10 ENCOUNTER — NURSE TRIAGE (OUTPATIENT)
Dept: NURSING | Facility: CLINIC | Age: 28
End: 2018-10-10

## 2018-10-10 ENCOUNTER — TELEPHONE (OUTPATIENT)
Dept: OBGYN | Facility: OTHER | Age: 28
End: 2018-10-10

## 2018-10-10 ENCOUNTER — PRENATAL OFFICE VISIT (OUTPATIENT)
Dept: OBGYN | Facility: CLINIC | Age: 28
End: 2018-10-10
Payer: COMMERCIAL

## 2018-10-10 ENCOUNTER — RADIANT APPOINTMENT (OUTPATIENT)
Dept: ULTRASOUND IMAGING | Facility: CLINIC | Age: 28
End: 2018-10-10
Attending: OBSTETRICS & GYNECOLOGY
Payer: COMMERCIAL

## 2018-10-10 VITALS
OXYGEN SATURATION: 100 % | DIASTOLIC BLOOD PRESSURE: 76 MMHG | HEART RATE: 87 BPM | BODY MASS INDEX: 22.52 KG/M2 | SYSTOLIC BLOOD PRESSURE: 112 MMHG | WEIGHT: 124.4 LBS

## 2018-10-10 DIAGNOSIS — O09.892 HISTORY OF PRETERM DELIVERY, CURRENTLY PREGNANT IN SECOND TRIMESTER: ICD-10-CM

## 2018-10-10 DIAGNOSIS — O09.892 HISTORY OF PRETERM DELIVERY, CURRENTLY PREGNANT IN SECOND TRIMESTER: Primary | ICD-10-CM

## 2018-10-10 PROCEDURE — 76816 OB US FOLLOW-UP PER FETUS: CPT | Performed by: STUDENT IN AN ORGANIZED HEALTH CARE EDUCATION/TRAINING PROGRAM

## 2018-10-10 PROCEDURE — 99207 ZZC COMPLICATED OB VISIT: CPT | Performed by: OBSTETRICS & GYNECOLOGY

## 2018-10-10 NOTE — PROGRESS NOTES
She presents c/o uterine contractions which began this morning at around 0230 and awoke her from sleep.  She initially experienced 2 contractions, about 5 minutes apart.  Since then, she has felt menstrual-like cramping but no spotting or bleeding.  Due to her hx of  delivery, she is quite nervous and would like a measurement of her cervical length today.  She is scheduled with [facility] next week for her next growth US and was instructed to keep this appt.  She just recently started on [place] injections and does not have any questions regarding these.

## 2018-10-10 NOTE — TELEPHONE ENCOUNTER
"Reason for Call/Nurse Assessment:  27 y/o female calls about 21 weeks pregnant, hx of pre-term labor, using progesterone injections, first one yesterday. Had 2 episodes of achy back pain, radiation to stomach and down legs - thinks source of pain was at site of injection. The two waves or contractions of pain were 5 minutes apart - has been almost 1 hour since the episodes, no additional contractions. She is lying in bed, still feels achy in low back but no aditional waves/contractoions. Advise her per protocols, to call PCP in the morning when clinic opens (next 4 hours) and let them know her symptoms, see if there is anything else thay want her to do. If she has another episode or symptoms worsen, call back and we will engage a 2nd level triage with Labor and Delivery. Patient verbalized understanding of and agreement with plan and had no further questions.     Delivering at [facility]     Adriana Church RN - [place] Nurse Advisor  10/10/2018   4:05 AM    Reason for Disposition    Prior history of  labor    Additional Information    Negative: Passed out (i.e., lost consciousness, collapsed and was not responding)    Negative: Shock suspected (e.g., cold/pale/clammy skin, too weak to stand, low BP, rapid pulse)    Negative: Difficult to awaken or acting confused  (e.g., disoriented, slurred speech)    Negative: [1] SEVERE abdominal pain (e.g., excruciating) AND [2] constant AND [3] present > 1 hour    Negative: Severe bleeding (e.g., continuous red blood from vagina, or large blood clots)    Negative: Umbilical cord hanging out of the vagina (shiny, white, curled appearance, \"like telephone cord\")    Negative: Uncontrollable urge to push (i.e., feels like baby is coming out now)    Negative: Can see baby    Negative: Sounds like a life-threatening emergency to the triager    Negative: MODERATE-SEVERE abdominal pain    Negative: Contractions < 10 minutes apart for 1 hour (i.e., 6 or more contractions " an hour)    Negative: [1] Contractions > 10 minutes apart AND [2] persist > 24 hours AND [3] no improvement using Care Advice    Negative: [1] Contractions AND [2] any vaginal bleeding (including: red blood, clots, spotting, or pink/brown mucous)    Negative: Vaginal bleeding    (Exception: spotting after intercourse or pelvic exam, or slight pinkish or brownish mucous discharge)    Negative: Leakage of fluid from vagina    Negative: [1] Baby moving less today (e.g., kick count < 5 in 1 hour or < 10 in 2 hours) AND [2] pregnant 23 or more weeks    Negative: No movement of baby for 8 hours    Negative: New hand or face swelling    Negative: Fever > 100.4 F (38.0 C)    Negative: Increased pressure in pelvic area    Negative: [1] Lower back discomfort AND [2] not relieved by rest    Negative: Has a cerclage (i.e., a procedure where the obstetrician stitches shut the cervix)    Negative: Pinkish or brownish mucous discharge  (Exception: spotting after intercourse or pelvic exam)    Negative: Patient sounds very sick or weak to the triager    Negative: Baby has dropped    Negative: Diarrhea    Negative: Increase in vaginal discharge    Protocols used: PREGNANCY - LABOR - -ADULT-

## 2018-10-10 NOTE — PATIENT INSTRUCTIONS
If you have any questions regarding your visit, Please contact your care team.    [facility]: [phone]      [facility] CLINIC HOURS TELEPHONE NUMBER   Devika Barreto DO.    RAMIRO Contreras -    DEA Barba       Monday, Wednesday, Thursday and Friday, [place]  8:30a.m-5:00 p.m   [facility]  [address] [place] [address]  [phone] ask for [facility]    Imaging [phone]       Urgent Care locations:    [facility] Saturday and Sunday   9 am - 5 pm    Monday-Friday   12 pm - 8 pm  Saturday and Sunday   9 am - 5 pm   [phone]     [facility] Labor and Delivery:  [phone]    If you need a medication refill, please contact your pharmacy. Please allow 3 business days for your refill to be completed.  As always, Thank you for trusting us with your healthcare needs!

## 2018-10-10 NOTE — MR AVS SNAPSHOT
After Visit Summary   10/10/2018    Lizzie Oconnell    MRN: [id number]           Patient Information     Date Of Birth          1990        Visit Information        Provider Department      10/10/2018 1:30 PM Devika Barreto DO [facility]        Care Instructions                                                         If you have any questions regarding your visit, Please contact your care team.    [facility] Access Services: [phone]      Women s Health CLINIC HOURS TELEPHONE NUMBER   Devika Barreto DO.    RAMIRO Contreras -    DAE Barba       Monday, Wednesday, [facility]  8:30a.m-5:00 p.m   [facility]  [address] [place] [address]  [phone] ask for Women's [facility]    Imaging [phone]       Urgent Care locations:    [facility] Saturday and Sunday   9 am - 5 pm    Monday-Friday   12 pm - 8 pm  Saturday and Sunday   9 am - 5 pm   [phone]     [facility] Labor and Delivery:  [phone]    If you need a medication refill, please contact your pharmacy. Please allow 3 business days for your refill to be completed.  As always, Thank you for trusting us with your healthcare needs!                Follow-ups after your visit        Your next 10 appointments already scheduled     Oct 10, 2018  1:30 PM CDT   ESTABLISHED PRENATAL with Devika Barreto DO   [facility]    [address]  [facility] [address] [phone]            Oct 16, 2018 11:30 AM CDT   Nurse Only with NL SAVANNA TEAM B, C   [facility]    [address]  [facility] [place] [facility] [address] [phone]            Nov 05, 2018  3:30 PM CST   ESTABLISHED PRENATAL with Diane Dolan MD   [facility]    [address] [facility] [address] [phone]              Who to contact     If you have questions or need follow up information about today's clinic visit or your schedule please contact [facility] directly at [phone].  Normal or non-critical lab and imaging results will be communicated to you by MyChart, letter or phone within 4 business days after the clinic has received the results. If you do not hear from us within 7 days, please contact the clinic through MyChart or phone. If you have a critical or abnormal lab result, we will notify you by phone as soon as possible.  Submit refill requests through [employer] or call your pharmacy and they will forward the refill request to us. Please allow 3 business days for your refill to be completed.          Additional Information About Your Visit        Care EveryWhere ID     This is your Care EveryWhere ID. This could be used by other organizations to access your [facility] medical records  [id number]        Your Vitals Were     Pulse Last Period Pulse Oximetry Breastfeeding? BMI (Body Mass Index)       87 05/14/2018 (Exact Date) 100% No 22.52 kg/m2        Blood Pressure from Last 3 Encounters:   10/10/18 112/76   10/04/18 102/64   09/06/18 110/60    Weight from Last 3 Encounters:   10/10/18 124 lb 6.4 oz (56.4 kg)   10/04/18 122 lb (55.3 kg)   09/06/18 117 lb 8 oz (53.3 kg)              Today, you had the following     No orders found for display       Primary Care Provider Office Phone # Fax #    [place] Mcn[place] [phone]       [address] [place] [address]        Equal Access to Services     LORE REES : Hadii isabella Flores, washellyda tess, qaybta kaalamina levy. So [facility] [phone].    ATENCIÓN: Si habla español, tiene a schafer disposición servicios gratuitos de asistencia lingüística. Llame al [phone].    We comply with applicable  federal civil rights laws and [place] laws. We do not discriminate on the basis of race, color, national origin, age, disability, sex, sexual orientation, or gender identity.            Thank you!     Thank you for choosing [facility]  for your care. Our goal is always to provide you with excellent care. Hearing back from our patients is one way we can continue to improve our services. Please take a few minutes to complete the written survey that you may receive in the mail after your visit with us. Thank you!             Your Updated Medication List - Protect others around you: Learn how to safely use, store and throw away your medicines at www.disposemymeds.org.          This list is accurate as of 10/10/18  1:20 PM.  Always use your most recent med list.                   Brand Name Dispense Instructions for use Diagnosis    DULoxetine 20 MG EC capsule    CYMBALTA    60 capsule    TAKE 1 CAPSULE (20 MG) BY MOUTH DAILY    TYREL (generalized anxiety disorder)       HYDROXYprogesterone caproate in oil 250 mg/mL intramuscular injection    AKUA    4 mL    Inject 1 mL (250 mg) into the muscle every 7 days    History of  delivery, currently pregnant       ondansetron 4 MG tablet    ZOFRAN    30 tablet    Take 1 tablet (4 mg) by mouth every 8 hours as needed for nausea    Nausea/vomiting in pregnancy       PRENATAL VITAMINS PO

## 2018-10-15 ENCOUNTER — TELEPHONE (OUTPATIENT)
Dept: FAMILY MEDICINE | Facility: OTHER | Age: 28
End: 2018-10-15

## 2018-10-15 DIAGNOSIS — O21.0 HYPEREMESIS GRAVIDARUM: Primary | ICD-10-CM

## 2018-10-15 RX ORDER — ONDANSETRON 8 MG/1
8 TABLET, FILM COATED ORAL EVERY 8 HOURS PRN
Qty: 30 TABLET | Refills: 1 | Status: SHIPPED | OUTPATIENT
Start: 2018-10-15 | End: 2019-10-16

## 2018-10-15 NOTE — TELEPHONE ENCOUNTER
"ondansetron (ZOFRAN) 4 MG tablet 30 tablet 0 7/24/2018  --   Sig - Route: Take 1 tablet (4 mg) by mouth every 8 hours as needed for nausea - Oral     Phone call to patient. Patient stated she was given Zofran 8 mg with her other pregnancies and not sure why she was given 4 mg with this pregnancy. Patient stated she had Zofran 4 mg #12 at the beginning of August and \"just ran out now.\" Patient stated she continues to have occasional nausea and \"nice to have it on hand if I need it.\" Patient stated she took 2 tabs in the past week. Verified pharmacy. Explained will get a message to Dr. Barreto to see if she can give Zofran 8 mg Rx instead of RF Zofran 4 mg.  Verified patient has [place] injection on 10-16-18.   Will route to Dr. Barreto for review & orders. Freda Ramirez RN, BAN      "

## 2018-10-15 NOTE — TELEPHONE ENCOUNTER
Reason for call:  Medication  Reason for Call:  Medication or medication refill:    Do you use a [facility]?  Name of the pharmacy and phone number for the current request:  [facility]    Name of the medication requested: ondansetron (ZOFRAN) 4 MG tablet    Other request: pt stated that she has been requesting 8 mg because that  Is what she takes and her prescription goes quick. Please advise.    Can we leave a detailed message on this number? YES    Phone number patient can be reached at: Cell number on file:    Telephone Information:   Mobile [phone]       Best Time: anytime    Call taken on 10/15/2018 at 11:16 AM by Diane Chambers

## 2018-10-15 NOTE — TELEPHONE ENCOUNTER
Devika Barreto DO  P [facility] Ob[facility] Patient Care        Caller: Unspecified (Today, 11:18 AM)                     Please let this pt know that her refill for Zofran 8 mg was sent to her pharmacy - [facility].  She was given 4 mg before because we start at the lower dose and work up to the higher dose only if needed      ondansetron (ZOFRAN) 8 MG tablet 30 tablet 1 10/15/2018  --   Sig - Route: Take 1 tablet (8 mg) by mouth every 8 hours as needed for nausea - Oral     Phone call to patient. Gave orders per Dr. Barreto as noted above. Patient was appreciative of assistance. Freda Ramirez RN, BAN

## 2018-10-16 ENCOUNTER — ALLIED HEALTH/NURSE VISIT (OUTPATIENT)
Dept: FAMILY MEDICINE | Facility: OTHER | Age: 28
End: 2018-10-16
Payer: COMMERCIAL

## 2018-10-16 ENCOUNTER — TRANSFERRED RECORDS (OUTPATIENT)
Dept: HEALTH INFORMATION MANAGEMENT | Facility: CLINIC | Age: 28
End: 2018-10-16

## 2018-10-16 PROCEDURE — 96372 THER/PROPH/DIAG INJ SC/IM: CPT

## 2018-10-16 PROCEDURE — 99207 ZZC NO CHARGE NURSE ONLY: CPT

## 2018-10-16 NOTE — MR AVS SNAPSHOT
After Visit Summary   10/16/2018    Lizzie Oconnell    MRN: [id number]           Patient Information     Date Of Birth          1990        Visit Information        Provider Department      10/16/2018 11:30 AM JAY JAY CORRALES TEAM B, [facility]        Today's Diagnoses      delivery    -  1       Follow-ups after your visit        Your next 10 appointments already scheduled     2018  3:30 PM CST   ESTABLISHED PRENATAL with Diane Dolan MD   [facility] ([facility]    [address] [facility] [address] [phone]              Who to contact     If you have questions or need follow up information about today's clinic visit or your schedule please contact [facility] directly at [phone].  Normal or non-critical lab and imaging results will be communicated to you by MyChart, letter or phone within 4 business days after the clinic has received the results. If you do not hear from us within 7 days, please contact the clinic through MyChart or phone. If you have a critical or abnormal lab result, we will notify you by phone as soon as possible.  Submit refill requests through [employer] or call your pharmacy and they will forward the refill request to us. Please allow 3 business days for your refill to be completed.          Additional Information About Your Visit        Care EveryWhere ID     This is your Care EveryWhere ID. This could be used by other organizations to access your [place] medical records  [id number]        Your Vitals Were     Last Period                   2018 (Exact Date)            Blood Pressure from Last 3 Encounters:   10/10/18 112/76   10/04/18 102/64   18 110/60    Weight from Last 3 Encounters:   10/10/18 124 lb 6.4 oz (56.4 kg)   10/04/18 122 lb (55.3 kg)   18 117 lb 8 oz (53.3 kg)              Today, you had the following     No orders found for display        Primary Care Provider Office Phone # Fax #    [place], [place] [phone]       [address] [place] [address]        Equal Access to Services     LORE REES : Hadii aad ku hadallyo Soomaali, waaxda luqadaha, qaybta kaalmada adeegyada, amina harrisn dora johns susy finnegan. So [facility] [phone].    ATENCIÓN: Si habla español, tiene a schafer disposición servicios gratuitos de asistencia lingüística. Llame al [phone].    We comply with applicable federal civil rights laws and [place] laws. We do not discriminate on the basis of race, color, national origin, age, disability, sex, sexual orientation, or gender identity.            Thank you!     Thank you for choosing [facility]  for your care. Our goal is always to provide you with excellent care. Hearing back from our patients is one way we can continue to improve our services. Please take a few minutes to complete the written survey that you may receive in the mail after your visit with us. Thank you!             Your Updated Medication List - Protect others around you: Learn how to safely use, store and throw away your medicines at www.disposemymeds.org.          This list is accurate as of 10/16/18 11:59 AM.  Always use your most recent med list.                   Brand Name Dispense Instructions for use Diagnosis    DULoxetine 20 MG EC capsule    CYMBALTA    60 capsule    TAKE 1 CAPSULE (20 MG) BY MOUTH DAILY    TYREL (generalized anxiety disorder)       HYDROXYprogesterone caproate in oil 250 mg/mL intramuscular injection    AKUA    4 mL    Inject 1 mL (250 mg) into the muscle every 7 days    History of  delivery, currently pregnant       * ondansetron 4 MG tablet    ZOFRAN    30 tablet    Take 1 tablet (4 mg) by mouth every 8 hours as needed for nausea    Nausea/vomiting in pregnancy       * ondansetron 8 MG tablet    ZOFRAN    30 tablet    Take 1 tablet (8 mg) by mouth every 8 hours as needed  for nausea    Hyperemesis gravidarum       PRENATAL VITAMINS PO           * Notice:  This list has 2 medication(s) that are the same as other medications prescribed for you. Read the directions carefully, and ask your doctor or other care provider to review them with you.

## 2018-10-16 NOTE — PROGRESS NOTES
The following medication was given:     MEDICATION: Mercy 250 MG/ML  ROUTE: IM  SITE: Ventrogluteal - Right  DOSE: 1 ML  LOT #: [id number]  :  [employer]  EXPIRATION DATE:  4/2020  NDC#: 20968-770-48    Roxana Puente MA

## 2018-10-23 ENCOUNTER — ALLIED HEALTH/NURSE VISIT (OUTPATIENT)
Dept: FAMILY MEDICINE | Facility: OTHER | Age: 28
End: 2018-10-23
Payer: COMMERCIAL

## 2018-10-23 DIAGNOSIS — Z87.51 HISTORY OF PRETERM DELIVERY: ICD-10-CM

## 2018-10-23 PROCEDURE — 96372 THER/PROPH/DIAG INJ SC/IM: CPT

## 2018-10-23 PROCEDURE — 99207 ZZC NO CHARGE NURSE ONLY: CPT

## 2018-10-23 NOTE — MR AVS SNAPSHOT
After Visit Summary   10/23/2018    Lizzie Oconnell    MRN: [id number]           Patient Information     Date Of Birth          1990        Visit Information        Provider Department      10/23/2018 4:00 PM JAY JAY CORRALES TEAM B, [facility]        Today's Diagnoses      labor with  delivery, fetus 1    -  1    History of  delivery           Follow-ups after your visit        Your next 10 appointments already scheduled     Oct 30, 2018  4:00 PM CDT   Nurse Only with JAY JAY CORRALES TEAM DAVID, [facility] ([facility])    [address] [address] [phone]            2018  3:30 PM CST   ESTABLISHED PRENATAL with Diane Dolan MD   [facility] ([facility])    [address] [id number] [phone]              Who to contact     If you have questions or need follow up information about today's clinic visit or your schedule please contact [facility] directly at [phone].  Normal or non-critical lab and imaging results will be communicated to you by MyChart, letter or phone within 4 business days after the clinic has received the results. If you do not hear from us within 7 days, please contact the clinic through MyChart or phone. If you have a critical or abnormal lab result, we will notify you by phone as soon as possible.  Submit refill requests through [employer] or call your pharmacy and they will forward the refill request to us. Please allow 3 business days for your refill to be completed.          Additional Information About Your Visit        Care EveryWhere ID     This is your Care EveryWhere ID. This could be used by other organizations to access your [place] medical records  [id number]        Your Vitals Were     Last Period                   2018 (Exact Date)            Blood Pressure from Last 3 Encounters:    10/10/18 112/76   10/04/18 102/64   18 110/60    Weight from Last 3 Encounters:   10/10/18 124 lb 6.4 oz (56.4 kg)   10/04/18 122 lb (55.3 kg)   18 117 lb 8 oz (53.3 kg)              We Performed the Following     INJECTION INTRAMUSCULAR OR SUB-Q        Primary Care Provider Office Phone # Fax #    [place], [place] [phone]       [address] [place] [address]        Equal Access to Services     [facility]: Hadii aad ku hadasho Soomaali, waaxda luqadaha, qaybta kaalmada adeegyada, amina jain . So [facility] [phone].    ATENCIÓN: Si habla español, tiene a schafer disposición servicios gratuitos de asistencia lingüística. [facility] [phone].    We comply with applicable federal civil rights laws and [place] laws. We do not discriminate on the basis of race, color, national origin, age, disability, sex, sexual orientation, or gender identity.            Thank you!     Thank you for choosing [facility]  for your care. Our goal is always to provide you with excellent care. Hearing back from our patients is one way we can continue to improve our services. Please take a few minutes to complete the written survey that you may receive in the mail after your visit with us. Thank you!             Your Updated Medication List - Protect others around you: Learn how to safely use, store and throw away your medicines at www.disposemymeds.org.          This list is accurate as of 10/23/18  4:11 PM.  Always use your most recent med list.                   Brand Name Dispense Instructions for use Diagnosis    DULoxetine 20 MG EC capsule    CYMBALTA    60 capsule    TAKE 1 CAPSULE (20 MG) BY MOUTH DAILY    TYREL (generalized anxiety disorder)       HYDROXYprogesterone caproate in oil 250 mg/mL intramuscular injection    AKUA    4 mL    Inject 1 mL (250 mg) into the muscle every 7 days    History of  delivery, currently  pregnant       * ondansetron 4 MG tablet    ZOFRAN    30 tablet    Take 1 tablet (4 mg) by mouth every 8 hours as needed for nausea    Nausea/vomiting in pregnancy       * ondansetron 8 MG tablet    ZOFRAN    30 tablet    Take 1 tablet (8 mg) by mouth every 8 hours as needed for nausea    Hyperemesis gravidarum       PRENATAL VITAMINS PO           * Notice:  This list has 2 medication(s) that are the same as other medications prescribed for you. Read the directions carefully, and ask your doctor or other care provider to review them with you.

## 2018-10-23 NOTE — PROGRESS NOTES
The following medication was given:     MEDICATION: Hydroxyprogesterone Caproate 250mg/mL in oil ([facility])  ROUTE: IM  SITE: Ventrogluteal - Left  DOSE: 250ml  LOT #: [id number]  :  [employer]  EXPIRATION DATE:  04/2020  NDC#: 92447-380-43   Jossie Kwong MA  Prior to injection verified patient identity using patient's name and date of birth.  Due to injection administration, patient instructed to remain in clinic for 15 minutes  afterwards, and to report any adverse reaction to me immediately.

## 2018-10-25 DIAGNOSIS — F41.1 GAD (GENERALIZED ANXIETY DISORDER): ICD-10-CM

## 2018-10-25 RX ORDER — DULOXETIN HYDROCHLORIDE 20 MG/1
CAPSULE, DELAYED RELEASE ORAL
Qty: 60 CAPSULE | Refills: 0 | Status: SHIPPED | OUTPATIENT
Start: 2018-10-25 | End: 2019-01-07

## 2018-10-25 NOTE — TELEPHONE ENCOUNTER
Cymbalta    Routing refill request to provider for review/approval because:  Pt is currently pregnant, routing to that provider who has a plan to wean medication prior to delivery    Leora Khalil RN, BSN

## 2018-10-26 ENCOUNTER — TELEPHONE (OUTPATIENT)
Dept: OBGYN | Facility: OTHER | Age: 28
End: 2018-10-26

## 2018-10-26 NOTE — TELEPHONE ENCOUNTER
Phone call placed to pt. Pt reports lightheadedness for the past several days.  Educated on hydration  ounces of water daily.  Informed it could be due to circulation changes or also low hemoglobin.  Encouraged to eat a iron rich diet and along with vitamin c rich juice or foods to help with absorption.  Pt verbalized understanding.  Sun Mccain RN on 10/26/2018 at 10:02 AM

## 2018-10-26 NOTE — TELEPHONE ENCOUNTER
Reason for call:  Patient reporting a symptom    Symptom or request: Lightheadedness    Duration (how long have symptoms been present): several days    Have you been treated for this before? No    Additional comments: Patient would like a call back.     Phone Number patient can be reached at:  Home number on file [phone] (home)    Best Time:  any    Can we leave a detailed message on this number:  YES    Call taken on 10/26/2018 at 9:25 AM by Alek Kwong

## 2018-10-30 ENCOUNTER — ALLIED HEALTH/NURSE VISIT (OUTPATIENT)
Dept: FAMILY MEDICINE | Facility: OTHER | Age: 28
End: 2018-10-30
Payer: COMMERCIAL

## 2018-10-30 DIAGNOSIS — O09.899 HISTORY OF PRETERM DELIVERY, CURRENTLY PREGNANT: Primary | ICD-10-CM

## 2018-10-30 PROCEDURE — 99207 ZZC NO CHARGE NURSE ONLY: CPT

## 2018-10-30 PROCEDURE — 96372 THER/PROPH/DIAG INJ SC/IM: CPT

## 2018-10-30 NOTE — MR AVS SNAPSHOT
After Visit Summary   10/30/2018    Lizzie Oconnell    MRN: [id number]           Patient Information     Date Of Birth          1990        Visit Information        Provider Department      10/30/2018 4:00 PM JAY JAY CORRALES TEAM B, [facility]        Today's Diagnoses     History of  delivery, currently pregnant    -  1       Follow-ups after your visit        Your next 10 appointments already scheduled     2018  3:30 PM CST   ESTABLISHED PRENATAL with Diane Dolan MD   [facility]    [address]  [facility] [id number] [phone]            2018  3:00 PM CST   Nurse Only with JAY JAY CORRALES TEAM B, [facility] ([facility])    [address]  [facility] [id number] [phone]              Who to contact     If you have questions or need follow up information about today's clinic visit or your schedule please contact [facility] directly at [phone].  Normal or non-critical lab and imaging results will be communicated to you by MyChart, letter or phone within 4 business days after the clinic has received the results. If you do not hear from us within 7 days, please contact the clinic through MyChart or phone. If you have a critical or abnormal lab result, we will notify you by phone as soon as possible.  Submit refill requests through [employer] or call your pharmacy and they will forward the refill request to us. Please allow 3 business days for your refill to be completed.          Additional Information About Your Visit        Care EveryWhere ID     This is your Care EveryWhere ID. This could be used by other organizations to access your [place] medical records  [id number]        Your Vitals Were     Last Period                   2018 (Exact Date)            Blood Pressure from Last 3 Encounters:   10/10/18 112/76   10/04/18 102/64    18 110/60    Weight from Last 3 Encounters:   10/10/18 124 lb 6.4 oz (56.4 kg)   10/04/18 122 lb (55.3 kg)   18 117 lb 8 oz (53.3 kg)              We Performed the Following     INJECTION INTRAMUSCULAR OR SUB-Q        Primary Care Provider Office Phone # Fax #    [place], [place] [phone]       [address] [place] [address]        Equal Access to Services     LORE REES : Hadii aad ku hadasho Soomaali, waaxda luqadaha, qaybta kaalmada adeegyada, waxay idiin hayaan adejosemanuel khjt jain . So [facility] [phone].    ATENCIÓN: Si habla español, tiene a schafer disposición servicios gratuitos de asistencia lingüística. [facility] [phone].    We comply with applicable federal civil rights laws and [place] laws. We do not discriminate on the basis of race, color, national origin, age, disability, sex, sexual orientation, or gender identity.            Thank you!     Thank you for choosing [facility]  for your care. Our goal is always to provide you with excellent care. Hearing back from our patients is one way we can continue to improve our services. Please take a few minutes to complete the written survey that you may receive in the mail after your visit with us. Thank you!             Your Updated Medication List - Protect others around you: Learn how to safely use, store and throw away your medicines at www.disposemymeds.org.          This list is accurate as of 10/30/18  4:30 PM.  Always use your most recent med list.                   Brand Name Dispense Instructions for use Diagnosis    DULoxetine 20 MG EC capsule    CYMBALTA    60 capsule    TAKE 1 CAPSULE BY MOUTH EVERY DAY    TYREL (generalized anxiety disorder)       HYDROXYprogesterone caproate in oil 250 mg/mL intramuscular injection    AKUA    4 mL    Inject 1 mL (250 mg) into the muscle every 7 days    History of  delivery, currently pregnant       * ondansetron 4 MG tablet     ZOFRAN    30 tablet    Take 1 tablet (4 mg) by mouth every 8 hours as needed for nausea    Nausea/vomiting in pregnancy       * ondansetron 8 MG tablet    ZOFRAN    30 tablet    Take 1 tablet (8 mg) by mouth every 8 hours as needed for nausea    Hyperemesis gravidarum       PRENATAL VITAMINS PO           * Notice:  This list has 2 medication(s) that are the same as other medications prescribed for you. Read the directions carefully, and ask your doctor or other care provider to review them with you.

## 2018-10-30 NOTE — NURSING NOTE
J Code not needed for this patient, as she has her own supply.  Dona Blanco CMA ([facility])

## 2018-11-02 ENCOUNTER — TELEPHONE (OUTPATIENT)
Dept: OBGYN | Facility: OTHER | Age: 28
End: 2018-11-02

## 2018-11-02 DIAGNOSIS — O09.899 HISTORY OF PRETERM DELIVERY, CURRENTLY PREGNANT: Primary | ICD-10-CM

## 2018-11-02 NOTE — TELEPHONE ENCOUNTER
Dr Dolan,   [facility] called to inform us that they will  no longer be able to supply the Mercy 250mg/ML multidose vials for the patient. They have the auto-injector available but they will need to get a new RX send to them to make this substitution. They will do a PA on the auto-injector with her medical benefits thru her insurance. We have one more dose for her in the [facility] for her dose next week. She will need this for the week of Nov 12th.     The number for [facility] is [phone].     Today I have called the patient to let her know the status of the Mercy.     Please let me know if you have any other questions.    Анна Venegas RN  Patient Care Supervisor  [facility]  Office:[phone]

## 2018-11-02 NOTE — TELEPHONE ENCOUNTER
New script faxed to [facility] pharmacy at [phone].  Called patient and informed her of that Script has been faxed.    Tia Fry, CMA  November 2, 2018

## 2018-11-05 ENCOUNTER — PRENATAL OFFICE VISIT (OUTPATIENT)
Dept: OBGYN | Facility: OTHER | Age: 28
End: 2018-11-05
Payer: COMMERCIAL

## 2018-11-05 VITALS
DIASTOLIC BLOOD PRESSURE: 64 MMHG | WEIGHT: 133.5 LBS | HEART RATE: 86 BPM | BODY MASS INDEX: 24.17 KG/M2 | SYSTOLIC BLOOD PRESSURE: 108 MMHG

## 2018-11-05 DIAGNOSIS — Z87.59 HISTORY OF PRIOR PREGNANCY WITH SGA NEWBORN: ICD-10-CM

## 2018-11-05 DIAGNOSIS — O09.899 HISTORY OF PRETERM DELIVERY, CURRENTLY PREGNANT: Primary | ICD-10-CM

## 2018-11-05 LAB
GLUCOSE 1H P 50 G GLC PO SERPL-MCNC: 75 MG/DL (ref 60–129)
HGB BLD-MCNC: 11.1 G/DL (ref 11.7–15.7)

## 2018-11-05 PROCEDURE — 00000218 ZZHCL STATISTIC OBHBG - HEMOGLOBIN: Performed by: OBSTETRICS & GYNECOLOGY

## 2018-11-05 PROCEDURE — 82950 GLUCOSE TEST: CPT | Performed by: OBSTETRICS & GYNECOLOGY

## 2018-11-05 PROCEDURE — 36415 COLL VENOUS BLD VENIPUNCTURE: CPT | Performed by: OBSTETRICS & GYNECOLOGY

## 2018-11-05 PROCEDURE — 86780 TREPONEMA PALLIDUM: CPT | Performed by: OBSTETRICS & GYNECOLOGY

## 2018-11-05 PROCEDURE — 99207 ZZC COMPLICATED OB VISIT: CPT | Performed by: OBSTETRICS & GYNECOLOGY

## 2018-11-05 ASSESSMENT — PATIENT HEALTH QUESTIONNAIRE - PHQ9: SUM OF ALL RESPONSES TO PHQ QUESTIONS 1-9: 2

## 2018-11-05 NOTE — PROGRESS NOTES
"  Presents for routine  appointment.     No complaints.  She gets a little nauseated after her weekly [place] injected.   No LOF/VB/Ctxs.    ROS:   and GI  negative.     Please see Prenatal Vitals and Notes Flowsheet for objective data.    A/P:  28 year old  at 25w0d       ICD-10-CM    1. History of  delivery, currently pregnant O09.219    2. History of prior pregnancy with SGA  Z87.59        1 hr glucola today.  She does not have access to MyChart.  She is Rh Positive   TDAP  next visit.    Discussed signs and symptoms of  labor  She will get her Mercy IM tomorrow, then it will switch to the autoinjector.    Continue cymbalta - stable  Consider growth US 32 wks, sooner if measuring small (hx of SGA baby).  Likely constitutional (both parents are 5' 2\"  Follow up in 4  weeks.      Diane Dolan MD        "

## 2018-11-05 NOTE — MR AVS SNAPSHOT
After Visit Summary   2018    Lizzie Oconnell    MRN: [id number]           Patient Information     Date Of Birth          1990        Visit Information        Provider Department      2018 3:30 PM Diane Dolan MD [facility]        Today's Diagnoses     History of  delivery, currently pregnant    -  1    History of prior pregnancy with SGA           Care Instructions    SIGNS OF  LABOR    Labor is  if it happens more than three weeks before your due date.    It can be hard to know if you are in labor, since the symptoms can be like the normal feelings of pregnancy.  Often, the only difference is the symptoms increase or they don't go away.     Signs of  labor can include:    Change in your vaginal discharge:  You will have more vaginal discharge when you are pregnant and it should be creamy white.  Call the clinic right away if your discharge has a foul odor, pink or bloody,  or if it becomes watery or is more than is normal for you during your pregnancy.    More than 5-6 contractions or tightenings per hour.  Contractions can feel like period cramps or bowel (gas or diarrhea) pain.  You will feel it in the lower part of your abdomen, in your back or as a pressure feeling in your bottom.  It is often regular, coming for 30 seconds or a minute and then going away, only to come back 5 or 10 minutes later. Some contractions are normal during pregnancy, but if you are feeling more than 5-6 in one hour, empty your bladder, then drink 16-24 ounces of water, eat a snack and lay down on your left side. Put your hand on your abdomen to count the contractions.  If after one hour of resting you have still had 5-6 contractions call your clinic.             Follow-ups after your visit        Follow-up notes from your care team     Return in about 4 weeks (around 12/3/2018) for OB Visit.      Your next 10 appointments already scheduled     Nov  06, 2018  3:00 PM CST   Nurse Only with NL FLOAT TEAM B, [facility] ([facility])    [address]  [facility] [id number] [phone]              Who to contact     If you have questions or need follow up information about today's clinic visit or your schedule please contact [facility] directly at [phone].  Normal or non-critical lab and imaging results will be communicated to you by MyChart, letter or phone within 4 business days after the clinic has received the results. If you do not hear from us within 7 days, please contact the clinic through MyChart or phone. If you have a critical or abnormal lab result, we will notify you by phone as soon as possible.  Submit refill requests through [employer] or call your pharmacy and they will forward the refill request to us. Please allow 3 business days for your refill to be completed.          Additional Information About Your Visit        Care EveryWhere ID     This is your Care EveryWhere ID. This could be used by other organizations to access your [place] medical records  [id number]        Your Vitals Were     Pulse Last Period BMI (Body Mass Index)             86 05/14/2018 (Exact Date) 24.17 kg/m2          Blood Pressure from Last 3 Encounters:   11/05/18 108/64   10/10/18 112/76   10/04/18 102/64    Weight from Last 3 Encounters:   11/05/18 133 lb 8 oz (60.6 kg)   10/10/18 124 lb 6.4 oz (56.4 kg)   10/04/18 122 lb (55.3 kg)              Today, you had the following     No orders found for display       Primary Care Provider Office Phone # Fax #    [place] [phone]       [address] [place] [address]        Equal Access to Services     LORE REES : Hadii isabella Flores, waaxda timmyadaha, qaybta kaalmada nikkie, amina finnegan. So [facility] [phone].    ATENCIÓN: Si habla español, tiene a schafer disposición  servicios gratuitos de asistencia lingüística. Erickson chavez [phone].    We comply with applicable federal civil rights laws and [place] laws. We do not discriminate on the basis of race, color, national origin, age, disability, sex, sexual orientation, or gender identity.            Thank you!     Thank you for choosing [facility]  for your care. Our goal is always to provide you with excellent care. Hearing back from our patients is one way we can continue to improve our services. Please take a few minutes to complete the written survey that you may receive in the mail after your visit with us. Thank you!             Your Updated Medication List - Protect others around you: Learn how to safely use, store and throw away your medicines at www.disposemymeds.org.          This list is accurate as of 18  3:51 PM.  Always use your most recent med list.                   Brand Name Dispense Instructions for use Diagnosis    DULoxetine 20 MG EC capsule    CYMBALTA    60 capsule    TAKE 1 CAPSULE BY MOUTH EVERY DAY    TYREL (generalized anxiety disorder)       * HYDROXYprogesterone caproate in oil 250 mg/mL intramuscular injection    AKUA    4 mL    Inject 1 mL (250 mg) into the muscle every 7 days    History of  delivery, currently pregnant       * HYDROXYprogesterone caproate subcutaneous injection    AKUA    12 Syringe    Inject 1.1 mLs (275 mg) Subcutaneous every 7 days    History of  delivery, currently pregnant       * ondansetron 4 MG tablet    ZOFRAN    30 tablet    Take 1 tablet (4 mg) by mouth every 8 hours as needed for nausea    Nausea/vomiting in pregnancy       * ondansetron 8 MG tablet    ZOFRAN    30 tablet    Take 1 tablet (8 mg) by mouth every 8 hours as needed for nausea    Hyperemesis gravidarum       PRENATAL VITAMINS PO           * Notice:  This list has 4 medication(s) that are the same as other medications prescribed for you. Read the directions carefully, and  ask your doctor or other care provider to review them with you.

## 2018-11-05 NOTE — NURSING NOTE
"Chief Complaint   Patient presents with     Prenatal Care       Initial /64 (BP Location: Right arm, Patient Position: Chair, Cuff Size: Adult Regular)  Pulse 86  Wt 133 lb 8 oz (60.6 kg)  LMP 2018 (Exact Date)  BMI 24.17 kg/m2 Estimated body mass index is 24.17 kg/(m^2) as calculated from the following:    Height as of 18: 5' 2.32\" (1.583 m).    Weight as of this encounter: 133 lb 8 oz (60.6 kg).  BP completed using cuff size: regular        Tia Fry, RAMIRO  2018          "

## 2018-11-06 ENCOUNTER — ALLIED HEALTH/NURSE VISIT (OUTPATIENT)
Dept: FAMILY MEDICINE | Facility: OTHER | Age: 28
End: 2018-11-06
Payer: COMMERCIAL

## 2018-11-06 DIAGNOSIS — O09.219 PREGNANCY WITH HISTORY OF PRE-TERM LABOR: Primary | ICD-10-CM

## 2018-11-06 LAB — T PALLIDUM AB SER QL: NONREACTIVE

## 2018-11-06 PROCEDURE — 99207 ZZC NO CHARGE NURSE ONLY: CPT

## 2018-11-06 PROCEDURE — 96372 THER/PROPH/DIAG INJ SC/IM: CPT

## 2018-11-13 ENCOUNTER — ALLIED HEALTH/NURSE VISIT (OUTPATIENT)
Dept: FAMILY MEDICINE | Facility: OTHER | Age: 28
End: 2018-11-13
Payer: COMMERCIAL

## 2018-11-13 DIAGNOSIS — O09.899 HISTORY OF PRETERM DELIVERY, CURRENTLY PREGNANT: Primary | ICD-10-CM

## 2018-11-13 PROCEDURE — 96372 THER/PROPH/DIAG INJ SC/IM: CPT

## 2018-11-13 PROCEDURE — 99207 ZZC NO CHARGE NURSE ONLY: CPT

## 2018-11-13 NOTE — MR AVS SNAPSHOT
"              After Visit Summary   2018    Lizzie Oconnell    MRN: [id number]           Patient Information     Date Of Birth          1990        Visit Information        Provider Department      2018 4:00 PM JAY JAY CORRALES TEAM B, [facility]        Today's Diagnoses     History of  delivery, currently pregnant    -  1       Follow-ups after your visit        Your next 10 appointments already scheduled     2018  9:45 AM CST   ESTABL[facility] PRENATAL with [facility]olan [facility]   [facility]    [address] [facility] [id number] [phone]              Who to contact     If you have questions or need follow up information about today's clinic visit or your schedule please contact [facility] directly at [phone].  Normal or non-critical lab and imaging results will be communicated to you by Ancestryhart, letter or phone within 4 business days after the clinic has received the results. If you do not hear from us within 7 days, please contact the clinic through MyChart or phone. If you have a critical or abnormal lab result, we will notify you by phone as soon as possible.  Submit refill requests through Fantex or call your pharmacy and they will forward the refill request to us. Please allow 3 business days for your refill to be completed.          Additional Information About Your Visit        MyChart Information     Fantex lets you send messages to your doctor, view your test results, renew your prescriptions, schedule appointments and more. To sign up, go to [email or web address] . Click on \"Log in\" on the left side of the screen, which will take you to the Welcome page. Then click on \"Sign up Now\" on the right side of the page.     You will be asked to enter the access code listed below, as well as some personal information. Please follow the directions to create your username and password.     Your " access code is: [email or web address]  Expires: 2019  4:00 PM     Your access code will  in 90 days. If you need help or a new code, please call your [facility] clinic or [phone].        Care EveryWhere ID     This is your Care EveryWhere ID. This could be used by other organizations to access your [facility] medical records  [id number]        Your Vitals Were     Last Period                   2018 (Exact Date)            Blood Pressure from Last 3 Encounters:   18 108/64   10/10/18 112/76   10/04/18 102/64    Weight from Last 3 Encounters:   18 133 lb 8 oz (60.6 kg)   10/10/18 124 lb 6.4 oz (56.4 kg)   10/04/18 122 lb (55.3 kg)              We Performed the Following     INJECTION INTRAMUSCULAR OR SUB-Q        Primary Care Provider Office Phone # Fax #    B[place] [phone]       [address] [place] [address]        Equal Access to Services     [facility]: Hadii aad ku hadasho Soomaali, waaxda luqadaha, qaybta kaalmada adeegyada, waxjessica jain . So [facility] [phone].    ATENCIÓN: Si habla español, tiene a schafer disposición servicios gratuitos de asistencia lingüística. [facility] [phone].    We comply with applicable federal civil rights laws and [place] laws. We do not discriminate on the basis of race, color, national origin, age, disability, sex, sexual orientation, or gender identity.            Thank you!     Thank you for choosing [facility]  for your care. Our goal is always to provide you with excellent care. Hearing back from our patients is one way we can continue to improve our services. Please take a few minutes to complete the written survey that you may receive in the mail after your visit with us. Thank you!             Your Updated Medication List - Protect others around you: Learn how to safely use, store and throw away your medicines at www.disposemymeds.org.          This list is  accurate as of 18  4:50 PM.  Always use your most recent med list.                   Brand Name Dispense Instructions for use Diagnosis    DULoxetine 20 MG EC capsule    CYMBALTA    60 capsule    TAKE 1 CAPSULE BY MOUTH EVERY DAY    TYREL (generalized anxiety disorder)       * HYDROXYprogesterone caproate in oil 250 mg/mL intramuscular injection    AKUA    4 mL    Inject 1 mL (250 mg) into the muscle every 7 days    History of  delivery, currently pregnant       * HYDROXYprogesterone caproate subcutaneous injection    AKUA    12 Syringe    Inject 1.1 mLs (275 mg) Subcutaneous every 7 days    History of  delivery, currently pregnant       * ondansetron 4 MG tablet    ZOFRAN    30 tablet    Take 1 tablet (4 mg) by mouth every 8 hours as needed for nausea    Nausea/vomiting in pregnancy       * ondansetron 8 MG tablet    ZOFRAN    30 tablet    Take 1 tablet (8 mg) by mouth every 8 hours as needed for nausea    Hyperemesis gravidarum       PRENATAL VITAMINS PO           * Notice:  This list has 4 medication(s) that are the same as other medications prescribed for you. Read the directions carefully, and ask your doctor or other care provider to review them with you.

## 2018-11-14 NOTE — TELEPHONE ENCOUNTER
[facility] did not have any Crewe auto-injectors sent us this week for patient's injection on Tues.  We used the clinic stock supply for her dose. That being said I called [facility] today to inquire about the Crewe and making sure her dose was here for next week. What I found out is that [facility] does not have the auto-injector any longer and that is why they were not sent. I found out that they do have the 250mg/1ML vials but in limited supply.  I went ahead and approved [facility] sending out the substitution for the 250 mg /1ml auto-injectors to the 250mg/1ml vials again starting next week, Nov 19th. They will be sending 4 doses to the us for this patient. These 4 doses will get patient thru the Dec 11 injection date.      [facility] does not mail out the next order till they receive a call from the clinic indicating that they are needed. I will call [facility] back on Dec 4th to make sure that they are sending an additional 4 doses that would be needed for the Dec18th, Dec 25th, Jan 1 and Jan 8th dates if these are going to needed.    How long will the patient be needing this injection? I can put a calendar reminder on my schedule to keep calling [facility] for as long as the patient is going to need this. Thanks    Анна Venegas RN  Patient Care Supervisor  [facility]  Office:[phone]

## 2018-11-15 ENCOUNTER — TELEPHONE (OUTPATIENT)
Dept: OBGYN | Facility: OTHER | Age: 28
End: 2018-11-15

## 2018-11-15 NOTE — TELEPHONE ENCOUNTER
Reason for Call:  Medication or medication refill:    Do you use a [facility]?  Name of the pharmacy and phone number for the current request:  [facility]    Name of the medication requested:  Need verbal order for generic [place].  There is a shortage of [place] so they need verbal okay for generic.  Please call [phone].    Other request: none    Can we leave a detailed message on this number? Not Applicable    Phone number patient can be reached at: Other phone number:  [phone] option 2 for pharmacy    Best Time: n/a    Call taken on 11/15/2018 at 12:17 PM by Mar Gallardo

## 2018-11-15 NOTE — TELEPHONE ENCOUNTER
Reached [facility] pharmacy again and gave verbal order for the generic form of Mercy 250 mg/ml.    Kimber Bolanos RN

## 2018-11-15 NOTE — TELEPHONE ENCOUNTER
Spoke with [facility] to give the verbal order to use the generic form of Kenova for pt.  Pt has 2 different prescriptions on her med list for Kenova, one is for 250 mg/ml and the other is for 1.1 ml (275 mg).  The pharmacy tech that I was speaking to states they do not have the 275mg Mercy at this time, it is on back order and they are unsure when they will get it.  They do have the generic form of the 250 mg/ml of the Kenova and a limited supply of the brand.  I need to clarify with Dr. Dolan if it is okay that the pt receive the generic form of the 250mg/ml Kenova since she more recently prescribed the 275mg Mercy.  Will call [facility] back after discussing with Dr. Dolan.    Kimber Bolanos RN

## 2018-11-15 NOTE — TELEPHONE ENCOUNTER
I am reviewing for the provider who is away. Please notify patient and pharmacy.    I think that the substitution prescription is fine for now.   Dale Angela MD

## 2018-11-16 NOTE — TELEPHONE ENCOUNTER
Usually patient's remain on medication thru 36 weeks gestation (discontinue at term) would need last dose week of Jan 21, 2019 based on her GINGER of 2/18/19. Thank you. Johnna NOYOLA CNP

## 2018-11-16 NOTE — TELEPHONE ENCOUNTER
Thank you for responding. I will add this to my calendar. The patient has been notified of these changes and the plan for this injection.     Анна Venegas RN  Patient Care Supervisor  [facility]  Office:[phone]

## 2018-11-16 NOTE — TELEPHONE ENCOUNTER
Received phone call from [facility]- wanting to verify shipment/ delivery.  Medication will [facility] and [facility] at  [facility].  Package will need a signature on delivery.  Verified clinic address & phone number.

## 2018-11-20 ENCOUNTER — TELEPHONE (OUTPATIENT)
Dept: FAMILY MEDICINE | Facility: OTHER | Age: 28
End: 2018-11-20

## 2018-11-20 ENCOUNTER — ALLIED HEALTH/NURSE VISIT (OUTPATIENT)
Dept: FAMILY MEDICINE | Facility: OTHER | Age: 28
End: 2018-11-20
Payer: COMMERCIAL

## 2018-11-20 DIAGNOSIS — O09.899 HISTORY OF PRETERM DELIVERY, CURRENTLY PREGNANT: ICD-10-CM

## 2018-11-20 DIAGNOSIS — O09.899 HISTORY OF PRETERM DELIVERY, CURRENTLY PREGNANT: Primary | ICD-10-CM

## 2018-11-20 PROCEDURE — 96372 THER/PROPH/DIAG INJ SC/IM: CPT

## 2018-11-20 PROCEDURE — 99207 ZZC NO CHARGE LOS: CPT

## 2018-11-20 RX ORDER — HYDROXYPROGESTERONE CAPROATE 250 MG/ML
250 INJECTION INTRAMUSCULAR
Qty: 4 ML | Refills: 5
Start: 2018-11-20 | End: 2019-01-16

## 2018-11-20 NOTE — MR AVS SNAPSHOT
After Visit Summary   2018    Lizzie Oconnell    MRN: [id number]           Patient Information     Date Of Birth          1990        Visit Information        Provider Department      2018 3:30 PM NL FLOAT TEAM B, [facility]        Today's Diagnoses     History of  delivery, currently pregnant    -  1       Follow-ups after your visit        Your next 10 appointments already scheduled     2018  9:45 AM CST   ESTABLISHED PRENATAL with Diane Dolan MD   [facility]    [address] [facility] [address]   [phone]            2018  3:00 PM CST   Nurse Only with NL FLOAT TEAM B, [facility] ([facility])    [address] [address]   [phone]            Dec 04, 2018  4:00 PM CST   Nurse Only with NL FLOAT TEAM B, [facility] ([facility])    [address] [address]   [phone]            Dec 11, 2018  4:00 PM CST   Nurse Only with NL FLOAT TEAM B, [facility] ([facility])    [address] [address]   [phone]              Who to contact     If you have questions or need follow up information about today's clinic visit or your schedule please contact [facility] directly at [phone].  Normal or non-critical lab and imaging results will be communicated to you by AudioBoohart, letter or phone within 4 business days after the clinic has received the results. If you do not hear from us within 7 days, please contact the clinic through AudioBoohart or phone. If you have a critical or abnormal lab result, we will notify you by phone as soon as possible.  Submit refill requests through Penxy or call your pharmacy and they will forward the refill request to us. Please allow 3 business days for your  "refill to be completed.          Additional Information About Your Visit        Gamma Medica Information     Gamma Medica lets you send messages to your doctor, view your test results, renew your prescriptions, schedule appointments and more. To sign up, go to [email or web address] . Click on \"Log in\" on the left side of the screen, which will take you to the Welcome page. Then click on \"Sign up Now\" on the right side of the page.     You will be asked to enter the access code listed below, as well as some personal information. Please follow the directions to create your username and password.     Your access code is: [email or web address]  Expires: 2019  4:00 PM     Your access code will  in 90 days. If you need help or a new code, please call your [facility] clinic or [phone].        Care EveryWhere ID     This is your Care EveryWhere ID. This could be used by other organizations to access your [facility] medical records  [id number]        Your Vitals Were     Last Period                   2018 (Exact Date)            Blood Pressure from Last 3 Encounters:   18 108/64   10/10/18 112/76   10/04/18 102/64    Weight from Last 3 Encounters:   18 133 lb 8 oz (60.6 kg)   10/10/18 124 lb 6.4 oz (56.4 kg)   10/04/18 122 lb (55.3 kg)              We Performed the Following     INJECTION INTRAMUSCULAR OR SUB-Q          Today's Medication Changes          These changes are accurate as of 18  4:05 PM.  If you have any questions, ask your nurse or doctor.               These medicines have changed or have updated prescriptions.        Dose/Directions    HYDROXYprogesterone caproate in oil 250 mg/mL intramuscular injection   Commonly known as:  AKUA   This may have changed:  Another medication with the same name was removed. Continue taking this medication, and follow the directions you see here.   Used for:  History of  delivery, currently pregnant        Dose:  250 mg   Inject 1 mL (250 mg) into " the muscle every 7 days   Quantity:  4 mL   Refills:  5            Where to get your medicines      Some of these will need a paper prescription and others can be bought over the counter.  Ask your nurse if you have questions.     You don't need a prescription for these medications     HYDROXYprogesterone caproate in oil 250 mg/mL intramuscular injection                Primary Care Provider Office Phone # Fax #    [place], [place] [phone]       [address] [place] [address]        Equal Access to Services     [facility] : Hadii aad ku hadasho Soomaali, waaxda luqadaha, qaybta kaalmada adeegyada, waxay felecia haydonte jain . So [facility] [phone].    ATENCIÓN: Si habla español, tiene a schafer disposición servicios gratuitos de asistencia lingüística. [facility] [phone].    We comply with applicable federal civil rights laws and [place] laws. We do not discriminate on the basis of race, color, national origin, age, disability, sex, sexual orientation, or gender identity.            Thank you!     Thank you for choosing [facility]  for your care. Our goal is always to provide you with excellent care. Hearing back from our patients is one way we can continue to improve our services. Please take a few minutes to complete the written survey that you may receive in the mail after your visit with us. Thank you!             Your Updated Medication List - Protect others around you: Learn how to safely use, store and throw away your medicines at www.disposemymeds.org.          This list is accurate as of 11/20/18  4:05 PM.  Always use your most recent med list.                   Brand Name Dispense Instructions for use Diagnosis    DULoxetine 20 MG EC capsule    CYMBALTA    60 capsule    TAKE 1 CAPSULE BY MOUTH EVERY DAY    TYREL (generalized anxiety disorder)       HYDROXYprogesterone caproate in oil 250 mg/mL intramuscular injection    AKUA    4 mL     Inject 1 mL (250 mg) into the muscle every 7 days    History of  delivery, currently pregnant       * ondansetron 4 MG tablet    ZOFRAN    30 tablet    Take 1 tablet (4 mg) by mouth every 8 hours as needed for nausea    Nausea/vomiting in pregnancy       * ondansetron 8 MG tablet    ZOFRAN    30 tablet    Take 1 tablet (8 mg) by mouth every 8 hours as needed for nausea    Hyperemesis gravidarum       PRENATAL VITAMINS PO           * Notice:  This list has 2 medication(s) that are the same as other medications prescribed for you. Read the directions carefully, and ask your doctor or other care provider to review them with you.

## 2018-11-20 NOTE — TELEPHONE ENCOUNTER
Mercy in med room currently.  Patient scheduled for 3:30pm today.  Dona Blanco CMA ([facility])

## 2018-11-20 NOTE — NURSING NOTE
Patient has own supply of medication, so no JCode entered.  Dona Blanco CMA ([facility])

## 2018-11-26 ENCOUNTER — PRENATAL OFFICE VISIT (OUTPATIENT)
Dept: OBGYN | Facility: OTHER | Age: 28
End: 2018-11-26
Payer: COMMERCIAL

## 2018-11-26 VITALS
SYSTOLIC BLOOD PRESSURE: 110 MMHG | WEIGHT: 136.25 LBS | BODY MASS INDEX: 24.66 KG/M2 | HEART RATE: 80 BPM | DIASTOLIC BLOOD PRESSURE: 70 MMHG

## 2018-11-26 DIAGNOSIS — O09.899 HISTORY OF PRETERM DELIVERY, CURRENTLY PREGNANT: Primary | ICD-10-CM

## 2018-11-26 DIAGNOSIS — Z87.59 HISTORY OF PRIOR PREGNANCY WITH SGA NEWBORN: ICD-10-CM

## 2018-11-26 PROCEDURE — 99207 ZZC COMPLICATED OB VISIT: CPT | Performed by: OBSTETRICS & GYNECOLOGY

## 2018-11-26 NOTE — NURSING NOTE
"Chief Complaint   Patient presents with     Prenatal Care       Initial /70 (BP Location: Left arm, Patient Position: Chair, Cuff Size: Adult Regular)  Pulse 80  Wt 136 lb 4 oz (61.8 kg)  LMP 2018 (Exact Date)  BMI 24.66 kg/m2 Estimated body mass index is 24.66 kg/(m^2) as calculated from the following:    Height as of 18: 5' 2.32\" (1.583 m).    Weight as of this encounter: 136 lb 4 oz (61.8 kg).  BP completed using cuff size: regular        Tia Fry, RAMIRO  2018          "

## 2018-11-26 NOTE — MR AVS SNAPSHOT
After Visit Summary   2018    Lizzie Oconnell    MRN: [id number]           Patient Information     Date Of Birth          1990        Visit Information        Provider Department      2018 9:45 AM Diane Dolan MD [facility]        Today's Diagnoses     History of  delivery, currently pregnant    -  1    History of prior pregnancy with SGA            Follow-ups after your visit        Follow-up notes from your care team     Return in about 2 weeks (around 12/10/2018) for OB Visit.      Your next 10 appointments already scheduled     2018  3:00 PM CST   Nurse Only with NL FLOAT TEAM B, [facility]    [address] [address]   [phone]            Dec 04, 2018  4:00 PM CST   Nurse Only with NL FLOAT TEAM B, [facility]    [address] [address] [phone]            Dec 11, 2018  4:00 PM CST   Nurse Only with NL FLOAT TEAM B, [facility]    [address] [place] [address]   [phone]              Who to contact     If you have questions or need follow up information about today's clinic visit or your schedule please contact [facility] directly at [phone].  Normal or non-critical lab and imaging results will be communicated to you by MyChart, letter or phone within 4 business days after the clinic has received the results. If you do not hear from us within 7 days, please contact the clinic through MyChart or phone. If you have a critical or abnormal lab result, we will notify you by phone as soon as possible.  Submit refill requests through DropGifts or call your pharmacy and they will forward the refill request to us. Please allow 3 business days for your refill to be completed.          Additional  "Information About Your Visit        MyChart Information     [employer] lets you send messages to your doctor, view your test results, renew your prescriptions, schedule appointments and more. To sign up, go to [email or web address] . Click on \"Log in\" on the left side of the screen, which will take you to the Welcome page. Then click on \"Sign up Now\" on the right side of the page.     You will be asked to enter the access code listed below, as well as some personal information. Please follow the directions to create your username and password.     Your access code is: [email or web address]  Expires: 2019  4:00 PM     Your access code will  in 90 days. If you need help or a new code, please call your [place] clinic or [phone].        Care EveryWhere ID     This is your Care EveryWhere ID. This could be used by other organizations to access your [place] medical records  [id number]        Your Vitals Were     Pulse Last Period BMI (Body Mass Index)             80 2018 (Exact Date) 24.66 kg/m2          Blood Pressure from Last 3 Encounters:   18 110/70   18 108/64   10/10/18 112/76    Weight from Last 3 Encounters:   18 136 lb 4 oz (61.8 kg)   18 133 lb 8 oz (60.6 kg)   10/10/18 124 lb 6.4 oz (56.4 kg)              Today, you had the following     No orders found for display       Primary Care Provider Office Phone # Fax #    B[place], [place] [phone]       [address] [place] [address]        Equal Access to Services     [facility]: Hadii isabella mackenzie Sodavid, waaxda luqadaha, qaybta kaalmada aderonnie, amina jain . So [facility] [phone].    ATENCIÓN: Si habla español, tiene a schafer disposición servicios gratuitos de asistencia lingüística. Llame al [phone].    We comply with applicable federal civil rights laws and [place] laws. We do not discriminate on the basis of race, color, national origin, " age, disability, sex, sexual orientation, or gender identity.            Thank you!     Thank you for choosing [facility]  for your care. Our goal is always to provide you with excellent care. Hearing back from our patients is one way we can continue to improve our services. Please take a few minutes to complete the written survey that you may receive in the mail after your visit with us. Thank you!             Your Updated Medication List - Protect others around you: Learn how to safely use, store and throw away your medicines at www.disposemymeds.org.          This list is accurate as of 18 10:10 AM.  Always use your most recent med list.                   Brand Name Dispense Instructions for use Diagnosis    DULoxetine 20 MG capsule    CYMBALTA    60 capsule    TAKE 1 CAPSULE BY MOUTH EVERY DAY    TYREL (generalized anxiety disorder)       HYDROXYprogesterone caproate in oil 250 mg/mL intramuscular injection    AKUA    4 mL    Inject 1 mL (250 mg) into the muscle every 7 days    History of  delivery, currently pregnant       * ondansetron 4 MG tablet    ZOFRAN    30 tablet    Take 1 tablet (4 mg) by mouth every 8 hours as needed for nausea    Nausea/vomiting in pregnancy       * ondansetron 8 MG tablet    ZOFRAN    30 tablet    Take 1 tablet (8 mg) by mouth every 8 hours as needed for nausea    Hyperemesis gravidarum       PRENATAL VITAMINS PO           * Notice:  This list has 2 medication(s) that are the same as other medications prescribed for you. Read the directions carefully, and ask your doctor or other care provider to review them with you.

## 2018-11-26 NOTE — PROGRESS NOTES
Presents for routine  appointment.     No complaints.    No LOF/VB/Ctxs.    ROS:   and GI  negative.     Please see Prenatal Vitals and Notes Flowsheet for objective data.    A/P:  28 year old  at 28w0d       ICD-10-CM    1. History of  delivery, currently pregnant O09.219    2. History of prior pregnancy with SGA  Z87.59        Continue Mer[facility].    GCT Normal  TDAP today.    Rhogam: not  needed  Discussed kick counts   Follow up in 2 weeks.      Diane Dolan MD

## 2018-11-27 ENCOUNTER — ALLIED HEALTH/NURSE VISIT (OUTPATIENT)
Dept: FAMILY MEDICINE | Facility: OTHER | Age: 28
End: 2018-11-27
Payer: COMMERCIAL

## 2018-11-27 DIAGNOSIS — O09.899 HISTORY OF PRETERM DELIVERY, CURRENTLY PREGNANT: Primary | ICD-10-CM

## 2018-11-27 PROCEDURE — 99207 ZZC NO CHARGE LOS: CPT

## 2018-11-27 PROCEDURE — 96372 THER/PROPH/DIAG INJ SC/IM: CPT

## 2018-11-27 NOTE — PROGRESS NOTES
Patient has own supply of [place], so No J-Code entered.  Dona Blanco CMA ([facility])

## 2018-11-27 NOTE — MR AVS SNAPSHOT
After Visit Summary   2018    Lizzie Oconnell    MRN: [id number]           Patient Information     Date Of Birth          1990        Visit Information        Provider Department      2018 3:00 PM NL FLOAT TEAM B, [facility]        Today's Diagnoses     History of  delivery, currently pregnant    -  1       Follow-ups after your visit        Your next 10 appointments already scheduled     Dec 04, 2018  4:00 PM CST   Nurse Only with NL FLOAT TEAM B, [facility] ([facility])    [address] [address]   [phone]            Dec 10, 2018  4:30 PM CST   ESTABL[facility] PR[facility]AT[facility] D[facility] MD   [facility]    [address] [facility] [address]   [phone]            Dec 11, 2018  4:00 PM CST   Nurse Only with JAY JAY CERRATOAT TEAM B, [facility] ([facility])    [address] [address]   [phone]            Dec 27, 2018  3:30 PM CST   ESTABL[facility] PRENATAL with [facility] MD   [facility]    [address] [facility] [address] [phone]            2019  4:30 PM CST   ESTABLISHED PRENATAL with Diane Dolan MD   [facility]    [address] [facility] [address] [phone]            2019  4:30 PM CST   ESTABLISHED PRENATAL with Diane Dolan MD   [facility]    [address] [facility] [address]   [phone]              Who to contact     If you have questions or need follow up information about today's clinic visit or your schedule please contact [facility] directly at [phone].  Normal or non-critical lab and imaging results will be communicated to you by MyChart, letter or  "phone within 4 business days after the clinic has received the results. If you do not hear from us within 7 days, please contact the clinic through [employer] or phone. If you have a critical or abnormal lab result, we will notify you by phone as soon as possible.  Submit refill requests through [employer] or call your pharmacy and they will forward the refill request to us. Please allow 3 business days for your refill to be completed.          Additional Information About Your Visit        [employer] Information     [employer] lets you send messages to your doctor, view your test results, renew your prescriptions, schedule appointments and more. To sign up, go to [email or web address] . Click on \"Log in\" on the left side of the screen, which will take you to the Welcome page. Then click on \"Sign up Now\" on the right side of the page.     You will be asked to enter the access code listed below, as well as some personal information. Please follow the directions to create your username and password.     Your access code is: [email or web address]  Expires: 2019  4:00 PM     Your access code will  in 90 days. If you need help or a new code, please call your [place] clinic or [phone].        Care EveryWhere ID     This is your Care EveryWhere ID. This could be used by other organizations to access your [place] medical records  [id number]        Your Vitals Were     Last Period                   2018 (Exact Date)            Blood Pressure from Last 3 Encounters:   18 110/70   18 108/64   10/10/18 112/76    Weight from Last 3 Encounters:   18 136 lb 4 oz (61.8 kg)   18 133 lb 8 oz (60.6 kg)   10/10/18 124 lb 6.4 oz (56.4 kg)              We Performed the Following     INJECTION INTRAMUSCULAR OR SUB-Q        Primary Care Provider Office Phone # Fax #    Belle DENNYS Mcnally [phone]       [address] [place] [address]        Equal Access to Services     " LORE [facility] : Hadii aad ku avril Flores, waaxda luqadaha, qaybta kaalmada aderonnie, waxjessica felecia haydonte acevedocashluciano jain . So [facility] [phone].    ATENCIÓN: Si habla español, tiene a schafer disposición servicios gratuitos de asistencia lingüística. Llame al [phone].    We comply with applicable federal civil rights laws and [place] laws. We do not discriminate on the basis of race, color, national origin, age, disability, sex, sexual orientation, or gender identity.            Thank you!     Thank you for choosing [facility]  for your care. Our goal is always to provide you with excellent care. Hearing back from our patients is one way we can continue to improve our services. Please take a few minutes to complete the written survey that you may receive in the mail after your visit with us. Thank you!             Your Updated Medication List - Protect others around you: Learn how to safely use, store and throw away your medicines at www.disposemymeds.org.          This list is accurate as of 18  3:23 PM.  Always use your most recent med list.                   Brand Name Dispense Instructions for use Diagnosis    DULoxetine 20 MG capsule    CYMBALTA    60 capsule    TAKE 1 CAPSULE BY MOUTH EVERY DAY    TYREL (generalized anxiety disorder)       HYDROXYprogesterone caproate 250 MG/ML injection    AKUA    4 mL    Inject 1 mL (250 mg) into the muscle every 7 days    History of  delivery, currently pregnant       * ondansetron 4 MG tablet    ZOFRAN    30 tablet    Take 1 tablet (4 mg) by mouth every 8 hours as needed for nausea    Nausea/vomiting in pregnancy       * ondansetron 8 MG tablet    ZOFRAN    30 tablet    Take 1 tablet (8 mg) by mouth every 8 hours as needed for nausea    Hyperemesis gravidarum       PRENATAL VITAMINS PO           * Notice:  This list has 2 medication(s) that are the same as other medications prescribed for you. Read the directions carefully, and ask  your doctor or other care provider to review them with you.

## 2018-12-04 ENCOUNTER — ALLIED HEALTH/NURSE VISIT (OUTPATIENT)
Dept: FAMILY MEDICINE | Facility: OTHER | Age: 28
End: 2018-12-04
Payer: COMMERCIAL

## 2018-12-04 DIAGNOSIS — Z87.59 HISTORY OF PRIOR PREGNANCY WITH SGA NEWBORN: Primary | ICD-10-CM

## 2018-12-04 PROCEDURE — 99207 ZZC NO CHARGE LOS: CPT

## 2018-12-04 PROCEDURE — 96372 THER/PROPH/DIAG INJ SC/IM: CPT

## 2018-12-04 NOTE — MR AVS SNAPSHOT
After Visit Summary   2018    Lizzie Oconnell    MRN: [id number]           Patient Information     Date Of Birth          1990        Visit Information        Provider Department      2018 4:00 PM NL FLOAT TEAM B, [facility]        Today's Diagnoses     History of prior pregnancy with SGA     -  1       Follow-ups after your visit        Your next 10 appointments already scheduled     Dec 10, 2018  4:30 PM CST   ESTABLISHED PRENATAL with Diane Dolan MD   [facility]    [address] [facility] [address]   [phone]            Dec 11, 2018  4:00 PM CST   Nurse Only with JAY JAY CERRATOAT TEAM B, [facility] ([facility])    [address] [address]   [phone]            Dec 27, 2018  3:30 PM CST   ESTABLISHED PRENATAL with Diane Dolan MD   [facility]    [address] [facility] [address] [phone]            2019  4:30 PM CST   ESTABLISHED PRENATAL with Diane Dolan MD   [facility]    [address] [facility] [address] [phone]            2019  4:30 PM CST   ESTABLISHED PRENATAL with Diane Dolan MD   [facility]    [address] [facility] [address] [phone]              Who to contact     If you have questions or need follow up information about today's clinic visit or your schedule please contact [facility] directly at [phone].  Normal or non-critical lab and imaging results will be communicated to you by MyChart, letter or phone within 4 business days after the clinic has received the results. If you do not hear from us within 7 days, please contact the clinic through MyChart or phone. If you have a critical or abnormal lab  "result, we will notify you by phone as soon as possible.  Submit refill requests through [employer] or call your pharmacy and they will forward the refill request to us. Please allow 3 business days for your refill to be completed.          Additional Information About Your Visit        [employer] Information     [employer] lets you send messages to your doctor, view your test results, renew your prescriptions, schedule appointments and more. To sign up, go to [email or web address] . Click on \"Log in\" on the left side of the screen, which will take you to the Welcome page. Then click on \"Sign up Now\" on the right side of the page.     You will be asked to enter the access code listed below, as well as some personal information. Please follow the directions to create your username and password.     Your access code is: [email or web address]  Expires: 2019  4:00 PM     Your access code will  in 90 days. If you need help or a new code, please call your [place] clinic or [phone].        Care EveryWhere ID     This is your Care EveryWhere ID. This could be used by other organizations to access your [place] medical records  [id number]        Your Vitals Were     Last Period                   2018 (Exact Date)            Blood Pressure from Last 3 Encounters:   18 110/70   18 108/64   10/10/18 112/76    Weight from Last 3 Encounters:   18 136 lb 4 oz (61.8 kg)   18 133 lb 8 oz (60.6 kg)   10/10/18 124 lb 6.4 oz (56.4 kg)              We Performed the Following     INJECTION INTRAMUSCULAR OR SUB-Q        Primary Care Provider Office Phone # Fax #    Belle DENNYS Mcnally [phone] [address] [place] [address]        Equal Access to Services     LORE REES : Manasa Flores, waaxda luqadaha, qaybta kaalmada dorayakeenan, amina finnegan. [facility] [phone].    ATENCIÓN: Si kimber snyder schafer " disposición servicios gratuitos de asistencia lingüística. Erickson chavez [phone].    We comply with applicable federal civil rights laws and [place] laws. We do not discriminate on the basis of race, color, national origin, age, disability, sex, sexual orientation, or gender identity.            Thank you!     Thank you for choosing [facility]  for your care. Our goal is always to provide you with excellent care. Hearing back from our patients is one way we can continue to improve our services. Please take a few minutes to complete the written survey that you may receive in the mail after your visit with us. Thank you!             Your Updated Medication List - Protect others around you: Learn how to safely use, store and throw away your medicines at www.disposemymeds.org.          This list is accurate as of 18  4:12 PM.  Always use your most recent med list.                   Brand Name Dispense Instructions for use Diagnosis    DULoxetine 20 MG capsule    CYMBALTA    60 capsule    TAKE 1 CAPSULE BY MOUTH EVERY DAY    TYREL (generalized anxiety disorder)       HYDROXYprogesterone caproate 250 MG/ML injection    AKUA    4 mL    Inject 1 mL (250 mg) into the muscle every 7 days    History of  delivery, currently pregnant       * ondansetron 4 MG tablet    ZOFRAN    30 tablet    Take 1 tablet (4 mg) by mouth every 8 hours as needed for nausea    Nausea/vomiting in pregnancy       * ondansetron 8 MG tablet    ZOFRAN    30 tablet    Take 1 tablet (8 mg) by mouth every 8 hours as needed for nausea    Hyperemesis gravidarum       PRENATAL VITAMINS PO           * Notice:  This list has 2 medication(s) that are the same as other medications prescribed for you. Read the directions carefully, and ask your doctor or other care provider to review them with you.

## 2018-12-04 NOTE — NURSING NOTE
Patient has own supply of [place], so No J-Code entered.  Dona Blanco CMA ([facility])

## 2018-12-10 ENCOUNTER — PRENATAL OFFICE VISIT (OUTPATIENT)
Dept: OBGYN | Facility: OTHER | Age: 28
End: 2018-12-10
Payer: COMMERCIAL

## 2018-12-10 VITALS
BODY MASS INDEX: 24.84 KG/M2 | DIASTOLIC BLOOD PRESSURE: 68 MMHG | WEIGHT: 137.25 LBS | SYSTOLIC BLOOD PRESSURE: 94 MMHG | HEART RATE: 80 BPM

## 2018-12-10 DIAGNOSIS — Z87.59 HISTORY OF PRIOR PREGNANCY WITH SMALL FOR GESTATIONAL AGE NEWBORN: Primary | ICD-10-CM

## 2018-12-10 DIAGNOSIS — O09.899 HISTORY OF PRETERM DELIVERY, CURRENTLY PREGNANT: ICD-10-CM

## 2018-12-10 PROCEDURE — 99207 ZZC COMPLICATED OB VISIT: CPT | Performed by: OBSTETRICS & GYNECOLOGY

## 2018-12-10 NOTE — PROGRESS NOTES
Presents for routine  appointment.     No complaints.    No LOF/VB/Ctxs.    ROS:   and GI  negative.     Please see Prenatal Vitals and Notes Flowsheet for objective data.    A/P:  28 year old  at 30w0d        ICD-10-CM    1. History of prior pregnancy with small for gestational age  Z87.59 US OB >14 Weeks Follow Up   2. History of  delivery, currently pregnant O09.219        Placed call for emergent Sancho, as the pharmacy is out of stock per patient. They required me to adjust the prescription again.  The sancho brand needed for insurance to cover it.  Biron IM 1 ml ordered, 4 ml with one refill.    US for growth in 2 weeks  Continue Sancho through 36 weeks.   Continue Cymbalta   Follow up in 2 weeks.      Diane Dolan MD

## 2018-12-11 ENCOUNTER — TELEPHONE (OUTPATIENT)
Dept: OBGYN | Facility: OTHER | Age: 28
End: 2018-12-11

## 2018-12-11 ENCOUNTER — ALLIED HEALTH/NURSE VISIT (OUTPATIENT)
Dept: FAMILY MEDICINE | Facility: OTHER | Age: 28
End: 2018-12-11
Payer: COMMERCIAL

## 2018-12-11 DIAGNOSIS — O09.899 HISTORY OF PRETERM DELIVERY, CURRENTLY PREGNANT: Primary | ICD-10-CM

## 2018-12-11 PROCEDURE — 99207 ZZC NO CHARGE NURSE ONLY: CPT

## 2018-12-11 NOTE — PROGRESS NOTES
The following medication was given:     MEDICATION: Hydroxyprogesterone Caproate 250mg/mL in oil ([facility])  ROUTE: IM  SITE: Ventrogluteal - Right  DOSE: 250mg/ml  LOT #: [id number]  :  [employer] [place]  EXPIRATION DATE:    NDC#: 5580-0170-38   Jossie Kwong MA

## 2018-12-11 NOTE — TELEPHONE ENCOUNTER
I called patient for update on Nallely.   She is unable to make a payment until Friday, will follow up with pharm then for delivery.     Adriana Linares, RN, BSN

## 2018-12-14 NOTE — TELEPHONE ENCOUNTER
Patient states she talked with pharmacy two days ago. They said the medication will be shipped 12/17 and arrive 12/18. Patient states that the pharmacy reportedly talked to someone here about delivery, but I do not see that documented.     Adriana Linares, RN, BSN

## 2018-12-18 ENCOUNTER — ALLIED HEALTH/NURSE VISIT (OUTPATIENT)
Dept: FAMILY MEDICINE | Facility: OTHER | Age: 28
End: 2018-12-18
Payer: COMMERCIAL

## 2018-12-18 DIAGNOSIS — O09.899 HISTORY OF PRETERM DELIVERY, CURRENTLY PREGNANT: Primary | ICD-10-CM

## 2018-12-18 PROCEDURE — 96372 THER/PROPH/DIAG INJ SC/IM: CPT

## 2018-12-18 PROCEDURE — 99207 ZZC NO CHARGE LOS: CPT

## 2018-12-18 RX ORDER — HYDROXYPROGESTERONE CAPROATE 250 MG/ML
250 INJECTION INTRAMUSCULAR
Qty: 12 ML | Refills: 3 | Status: CANCELLED | OUTPATIENT
Start: 2018-12-18 | End: 2019-12-18

## 2018-12-18 RX ORDER — HYDROXYPROGESTERONE CAPROATE 250 MG/ML
250 INJECTION INTRAMUSCULAR
Status: COMPLETED | OUTPATIENT
Start: 2018-12-18 | End: 2019-01-09

## 2018-12-18 RX ADMIN — HYDROXYPROGESTERONE CAPROATE 250 MG: 250 INJECTION INTRAMUSCULAR at 16:34

## 2018-12-26 ENCOUNTER — ALLIED HEALTH/NURSE VISIT (OUTPATIENT)
Dept: FAMILY MEDICINE | Facility: OTHER | Age: 28
End: 2018-12-26
Payer: COMMERCIAL

## 2018-12-26 DIAGNOSIS — O09.899 HISTORY OF PRETERM DELIVERY, CURRENTLY PREGNANT: Primary | ICD-10-CM

## 2018-12-26 PROCEDURE — 99207 ZZC NO CHARGE NURSE ONLY: CPT

## 2018-12-26 PROCEDURE — 96372 THER/PROPH/DIAG INJ SC/IM: CPT

## 2018-12-26 RX ADMIN — HYDROXYPROGESTERONE CAPROATE 250 MG: 250 INJECTION INTRAMUSCULAR at 15:24

## 2018-12-26 NOTE — PROGRESS NOTES
Prior to injection, verified patient identity using patient's name and date of birth.  Due to injection administration, patient instructed to remain in clinic for 15 minutes  afterwards, and to report any adverse reaction to me immediately.    [facility]    Drug Amount Wasted:  None.  Vial/Syringe: Single dose vial  Expiration Date:  04/2020

## 2018-12-27 ENCOUNTER — PRENATAL OFFICE VISIT (OUTPATIENT)
Dept: OBGYN | Facility: OTHER | Age: 28
End: 2018-12-27
Payer: COMMERCIAL

## 2018-12-27 VITALS
DIASTOLIC BLOOD PRESSURE: 60 MMHG | SYSTOLIC BLOOD PRESSURE: 104 MMHG | WEIGHT: 139.5 LBS | BODY MASS INDEX: 25.25 KG/M2 | HEART RATE: 90 BPM

## 2018-12-27 DIAGNOSIS — Z87.59 HISTORY OF PRIOR PREGNANCY WITH SMALL FOR GESTATIONAL AGE NEWBORN: Primary | ICD-10-CM

## 2018-12-27 DIAGNOSIS — O09.899 HISTORY OF PRETERM DELIVERY, CURRENTLY PREGNANT: ICD-10-CM

## 2018-12-27 PROCEDURE — 99207 ZZC COMPLICATED OB VISIT: CPT | Performed by: OBSTETRICS & GYNECOLOGY

## 2018-12-27 NOTE — PROGRESS NOTES
"No chief complaint on file.      Initial LMP 2018 (Exact Date)  Estimated body mass index is 24.84 kg/m  as calculated from the following:    Height as of 18: 1.583 m (5' 2.32\").    Weight as of 12/10/18: 62.3 kg (137 lb 4 oz).  BP completed using cuff size: {BP CUFF SIZE:507::\"regular\"}        Tdap Due               "

## 2018-12-27 NOTE — PROGRESS NOTES
Presents for routine  appointment.    Continues to contract, but not as painfully as yesterday.  She was seen in the L&D Monday and yesterday for contraction.  fFN negative.  She was closed Monday and / yesterday.    No LOF/VB  ROS:   and GI  negative.     Please see Prenatal Vitals and Notes Flowsheet for objective data.    A/P:  28 year old  at 32w3d       ICD-10-CM    1. History of prior pregnancy with small for gestational age  Z87.59    2. History of  delivery, currently pregnant O09.219        Return to L&D for increasing contractions or other concerns.    Continue [place] through 36 weeks.   Continue Cymbalta   Follow up in 2 weeks.      Diane Dolan MD

## 2018-12-27 NOTE — NURSING NOTE
"Chief Complaint   Patient presents with     Prenatal Care       Initial /60 (BP Location: Right arm, Patient Position: Chair, Cuff Size: Adult Regular)   Pulse 90   Wt 63.3 kg (139 lb 8 oz)   LMP 2018 (Exact Date)   BMI 25.25 kg/m   Estimated body mass index is 24.84 kg/m  as calculated from the following:    Height as of 18: 1.583 m (5' 2.32\").    Weight as of 12/10/18: 62.3 kg (137 lb 4 oz).  BP completed using cuff size: regular        Tia Fry, [facility]  2018          "

## 2018-12-28 ENCOUNTER — ANCILLARY PROCEDURE (OUTPATIENT)
Dept: ULTRASOUND IMAGING | Facility: OTHER | Age: 28
End: 2018-12-28
Attending: OBSTETRICS & GYNECOLOGY
Payer: COMMERCIAL

## 2018-12-28 DIAGNOSIS — Z87.59 HISTORY OF PRIOR PREGNANCY WITH SMALL FOR GESTATIONAL AGE NEWBORN: ICD-10-CM

## 2018-12-28 PROCEDURE — 76816 OB US FOLLOW-UP PER FETUS: CPT

## 2019-01-02 ENCOUNTER — ALLIED HEALTH/NURSE VISIT (OUTPATIENT)
Dept: FAMILY MEDICINE | Facility: OTHER | Age: 29
End: 2019-01-02
Payer: COMMERCIAL

## 2019-01-02 DIAGNOSIS — O09.219 CURRENT PREGNANCY WITH HISTORY OF PRE-TERM LABOR: Primary | ICD-10-CM

## 2019-01-02 PROCEDURE — 96372 THER/PROPH/DIAG INJ SC/IM: CPT

## 2019-01-02 PROCEDURE — 99207 ZZC NO CHARGE NURSE ONLY: CPT

## 2019-01-02 RX ADMIN — HYDROXYPROGESTERONE CAPROATE 250 MG: 250 INJECTION INTRAMUSCULAR at 15:17

## 2019-01-02 NOTE — NURSING NOTE
Prior to injection, verified patient identity using patient's name and date of birth.  Due to injection administration, patient instructed to remain in clinic for 15 minutes  afterwards, and to report any adverse reaction to me immediately.    [place]    Drug Amount Wasted:  None.  Vial/Syringe: Single dose vial  Expiration Date:  4/2020

## 2019-01-07 ENCOUNTER — PRENATAL OFFICE VISIT (OUTPATIENT)
Dept: OBGYN | Facility: OTHER | Age: 29
End: 2019-01-07

## 2019-01-07 VITALS
HEART RATE: 78 BPM | WEIGHT: 141.5 LBS | DIASTOLIC BLOOD PRESSURE: 78 MMHG | BODY MASS INDEX: 25.61 KG/M2 | SYSTOLIC BLOOD PRESSURE: 108 MMHG

## 2019-01-07 DIAGNOSIS — F41.1 GAD (GENERALIZED ANXIETY DISORDER): ICD-10-CM

## 2019-01-07 DIAGNOSIS — O09.899 HISTORY OF PRETERM DELIVERY, CURRENTLY PREGNANT: Primary | ICD-10-CM

## 2019-01-07 PROCEDURE — 99207 ZZC COMPLICATED OB VISIT: CPT | Performed by: OBSTETRICS & GYNECOLOGY

## 2019-01-07 NOTE — PROGRESS NOTES
Presents for routine  appointment.    She continues to contract throughout the day.  It depends on activity. She had two hours of contractions every 2 min, painful.  Resolved after bath, water, and rest.    No LOF/VB    ROS:   and GI  negative.     Please see Prenatal Vitals and Notes Flowsheet for objective data.    A/P:  28 year old  at 34w0d       ICD-10-CM    1. History of  delivery, currently pregnant O09.219        Continue [facility]cy through 36 wks.    Reportable signs and symptoms discussed.  She was encouraged to go to L&D if she contracts painfully and frequently like she did last night.    Group B Strep next visit  Follow up in 1 wk      Diane Dolan MD

## 2019-01-07 NOTE — NURSING NOTE
"Chief Complaint   Patient presents with     Prenatal Care       Initial /78 (BP Location: Left arm, Patient Position: Chair, Cuff Size: Adult Regular)   Pulse 78   Wt 64.2 kg (141 lb 8 oz)   LMP 2018 (Exact Date)   BMI 25.61 kg/m   Estimated body mass index is 25.25 kg/m  as calculated from the following:    Height as of 18: 1.583 m (5' 2.32\").    Weight as of 18: 63.3 kg (139 lb 8 oz).  BP completed using cuff size: regular        Tia Fry, [facility]  2019          "

## 2019-01-08 NOTE — TELEPHONE ENCOUNTER
"Serotonin-Norepinephrine Reuptake Inhibitors  Failed   DULoxetine (CYMBALTA) 20 MG capsule   Rerun Protocol (1/7/2019 3:25 PM)      No active pregnancy on record         Blood pressure under 140/90 in past 12 months          BP Readings from Last 3 Encounters:   01/07/19 108/78   12/27/18 104/60   12/10/18 94/68                Recent (12 mo) or future (30 days) visit within the authorizing provider's specialty      Patient had office visit in the last 12 months or has a visit in the next 30 days with authorizing provider or within the authorizing provider's specialty.  See \"Patient Info\" tab in inbasket, or \"Choose Columns\" in Meds & Orders section of the refill encounter.              Medication is active on med list         Patient is age 18 or older         No positive pregnancy test in past 12 months        Routing refill request to provider for review/approval because:  Currently pregnant    Kimber Bolanos RN          "

## 2019-01-09 ENCOUNTER — TELEPHONE (OUTPATIENT)
Dept: OBGYN | Facility: OTHER | Age: 29
End: 2019-01-09

## 2019-01-09 ENCOUNTER — ALLIED HEALTH/NURSE VISIT (OUTPATIENT)
Dept: FAMILY MEDICINE | Facility: OTHER | Age: 29
End: 2019-01-09
Payer: COMMERCIAL

## 2019-01-09 DIAGNOSIS — R79.89 HIGH SERUM 17-HYDROXYPROGESTERONE: Primary | ICD-10-CM

## 2019-01-09 PROCEDURE — 96372 THER/PROPH/DIAG INJ SC/IM: CPT

## 2019-01-09 RX ORDER — DULOXETIN HYDROCHLORIDE 20 MG/1
CAPSULE, DELAYED RELEASE ORAL
Qty: 90 CAPSULE | Refills: 3 | Status: SHIPPED | OUTPATIENT
Start: 2019-01-09 | End: 2020-01-23

## 2019-01-09 RX ADMIN — HYDROXYPROGESTERONE CAPROATE 250 MG: 250 INJECTION INTRAMUSCULAR at 15:08

## 2019-01-09 NOTE — TELEPHONE ENCOUNTER
Hi Dr. Dolan:       Lizzie needs 2 more vials of [place]/ Hydroxyprogesterone, she has these filled at specialty pharmacy.   Can you please order these -    Thank you.   Her next due is in 7 days ..

## 2019-01-09 NOTE — TELEPHONE ENCOUNTER
Please look into this.  There should be enough refills on the order placed a couple of months ago.

## 2019-01-10 ENCOUNTER — MEDICAL CORRESPONDENCE (OUTPATIENT)
Dept: OBGYN | Facility: OTHER | Age: 29
End: 2019-01-10

## 2019-01-10 ENCOUNTER — TELEPHONE (OUTPATIENT)
Dept: OBGYN | Facility: OTHER | Age: 29
End: 2019-01-10

## 2019-01-10 DIAGNOSIS — F41.1 GAD (GENERALIZED ANXIETY DISORDER): ICD-10-CM

## 2019-01-10 RX ORDER — DULOXETIN HYDROCHLORIDE 20 MG/1
CAPSULE, DELAYED RELEASE ORAL
Qty: 90 CAPSULE | Refills: 3 | OUTPATIENT
Start: 2019-01-10

## 2019-01-10 NOTE — TELEPHONE ENCOUNTER
MFM referral form completed and faxed along with pregnancy records.  Diane Morse, CMA  January 10, 2019 10:41 AM

## 2019-01-10 NOTE — TELEPHONE ENCOUNTER
Duplicate.  Rx sent to the requesting pharmacy on 1/9/19 for a 3 month supply with an additional 3 refills.    Kimber Bolanos RN

## 2019-01-10 NOTE — TELEPHONE ENCOUNTER
Diane Dolan MD  [facility] Ma/Lpn             I am not in the office tomorrow, but I am wondering if someone can fill out an M referral form and fax it for this patient.     Indication for Referral/Diagnosis: history of SGA baby.  Recent ultrasound with small AC.  History of PTD.  Also on Cymbalta.       Check the following boxes:     [place]     Follow up comprehensive (growth)     If no one is there to sign it, I can do in Friday morning.  I prefer this get to them asap so she can be scheduled by early next week.     Thanks!

## 2019-01-10 NOTE — TELEPHONE ENCOUNTER
----- Message from Diane Dolan MD sent at 1/9/2019  8:57 PM CST -----  Regarding: [facility] US ORDER  I am not in the office tomorrow, but I am wondering if someone can fill out an [facility] referral form and fax it for this patient.     Indication for Referral/Diagnosis: history of SGA baby.  Recent ultrasound with small AC.  History of PTD.  Also on Cymbalta.      Check the following boxes:    [place]     Follow up comprehensive (growth)    If no one is there to sign it, I can do in Friday morning.  I prefer this get to them asap so she can be scheduled by early next week.    Thanks!

## 2019-01-10 NOTE — TELEPHONE ENCOUNTER
Form filled out and signed by Dr. Israel. Form and Episode faxed to [facility]    [place]  Women's Health

## 2019-01-11 ENCOUNTER — TELEPHONE (OUTPATIENT)
Dept: FAMILY MEDICINE | Facility: OTHER | Age: 29
End: 2019-01-11

## 2019-01-11 NOTE — TELEPHONE ENCOUNTER
Phone call placed to [facility].  Verbal order provided for 4 doses of [place] auto injector placed. Sun Mccain RN on 1/11/2019 at 2:36 PM

## 2019-01-11 NOTE — TELEPHONE ENCOUNTER
Reason for Call:  Other prescription    Detailed comments: Patient called clinic. [facility] needs verbal ok for [facility] auto- injector. Please call pharmacy right away. Patient is due in the next few days. [phone].     Phone Number Patient can be reached at: Home number on file [phone] (home)    Best Time: any    Can we leave a detailed message on this number? YES    Call taken on 1/11/2019 at 11:08 AM by Alek Kwong

## 2019-01-11 NOTE — TELEPHONE ENCOUNTER
Writer has been in contact with [facility] several times today well over 10 minutes at a time to give verbal order for [place] auto Injector. Pt only needs 2 more injections. Order placed.Sun Mccain RN on 1/11/2019 at 4:00 PM

## 2019-01-11 NOTE — TELEPHONE ENCOUNTER
Reason for call:  [facility] is calling to get a verbal for the [place] auto injection. They had talked with someone about it and they were suppose to send something, but [facility] hasnt got anything yet so they would like a call back to get a verbal.     Pt needs this to be in the clinic by Wednesday and they wont send it until they get a verbal. Please advise.

## 2019-01-11 NOTE — PROGRESS NOTES
Presents for routine  appointment.    Continues to contract.    No LOF/VB.    ROS:   and GI negative.     Please see Prenatal Vitals and Notes Flowsheet for objective data.    A/P:  29 year old  at 35w0d       ICD-10-CM    1. History of  delivery, currently pregnant O09.219    2. History of prior pregnancy with small for gestational age  Z87.59        MFM US today for history of SGA.  Normal growth and EFW.  Normal fluid.    Reportable signs and symptoms discussed  Group B Strep negative 18.  Repeat done at last hospital eval and pending  Reviewed Cymbalta use.  Handout given   Follow up in 1 weeks.      Diane Dolan MD        Answers for HPI/ROS submitted by the patient on 2019   If you checked off any problems, how difficult have these problems made it for you to do your work, take care of things at home, or get along with other people?: Not difficult at all  PHQ9 TOTAL SCORE: 2

## 2019-01-14 ENCOUNTER — TRANSFERRED RECORDS (OUTPATIENT)
Dept: HEALTH INFORMATION MANAGEMENT | Facility: CLINIC | Age: 29
End: 2019-01-14

## 2019-01-14 ENCOUNTER — PRENATAL OFFICE VISIT (OUTPATIENT)
Dept: OBGYN | Facility: OTHER | Age: 29
End: 2019-01-14
Payer: COMMERCIAL

## 2019-01-14 VITALS
DIASTOLIC BLOOD PRESSURE: 70 MMHG | HEART RATE: 70 BPM | BODY MASS INDEX: 25.57 KG/M2 | SYSTOLIC BLOOD PRESSURE: 100 MMHG | WEIGHT: 141.25 LBS

## 2019-01-14 DIAGNOSIS — O09.899 HISTORY OF PRETERM DELIVERY, CURRENTLY PREGNANT: Primary | ICD-10-CM

## 2019-01-14 DIAGNOSIS — Z87.59 HISTORY OF PRIOR PREGNANCY WITH SMALL FOR GESTATIONAL AGE NEWBORN: ICD-10-CM

## 2019-01-14 PROCEDURE — 99207 ZZC COMPLICATED OB VISIT: CPT | Performed by: OBSTETRICS & GYNECOLOGY

## 2019-01-14 ASSESSMENT — PATIENT HEALTH QUESTIONNAIRE - PHQ9
10. IF YOU CHECKED OFF ANY PROBLEMS, HOW DIFFICULT HAVE THESE PROBLEMS MADE IT FOR YOU TO DO YOUR WORK, TAKE CARE OF THINGS AT HOME, OR GET ALONG WITH OTHER PEOPLE: NOT DIFFICULT AT ALL
SUM OF ALL RESPONSES TO PHQ QUESTIONS 1-9: 2
SUM OF ALL RESPONSES TO PHQ QUESTIONS 1-9: 2

## 2019-01-14 NOTE — NURSING NOTE
"Chief Complaint   Patient presents with     Prenatal Care       Initial /70 (BP Location: Right arm, Patient Position: Chair, Cuff Size: Adult Regular)   Pulse 70   Wt 64.1 kg (141 lb 4 oz)   LMP 2018 (Exact Date)   BMI 25.57 kg/m   Estimated body mass index is 25.57 kg/m  as calculated from the following:    Height as of 18: 1.583 m (5' 2.32\").    Weight as of this encounter: 64.1 kg (141 lb 4 oz).  BP completed using cuff size: regular        PHQ-9 score:    PHQ-9 SCORE 2019   PHQ-9 Total Score MyChart 2 (Minimal depression)   PHQ-9 Total Score 2       Tia Fry, [facility]  2019        "

## 2019-01-15 ASSESSMENT — PATIENT HEALTH QUESTIONNAIRE - PHQ9: SUM OF ALL RESPONSES TO PHQ QUESTIONS 1-9: 2

## 2019-01-16 ENCOUNTER — ALLIED HEALTH/NURSE VISIT (OUTPATIENT)
Dept: FAMILY MEDICINE | Facility: OTHER | Age: 29
End: 2019-01-16
Payer: COMMERCIAL

## 2019-01-16 ENCOUNTER — TELEPHONE (OUTPATIENT)
Dept: FAMILY MEDICINE | Facility: OTHER | Age: 29
End: 2019-01-16

## 2019-01-16 DIAGNOSIS — Z87.51 HISTORY OF PRETERM DELIVERY: Primary | ICD-10-CM

## 2019-01-16 DIAGNOSIS — O09.899 HISTORY OF PRETERM DELIVERY, CURRENTLY PREGNANT: Primary | ICD-10-CM

## 2019-01-16 DIAGNOSIS — O09.899 HISTORY OF PRETERM DELIVERY, CURRENTLY PREGNANT: ICD-10-CM

## 2019-01-16 PROCEDURE — 96372 THER/PROPH/DIAG INJ SC/IM: CPT

## 2019-01-16 PROCEDURE — 99207 ZZC NO CHARGE NURSE ONLY: CPT

## 2019-01-16 RX ORDER — HYDROXYPROGESTERONE CAPROATE 250 MG/ML
275 INJECTION INTRAMUSCULAR
Qty: 4 ML | Refills: 0
Start: 2019-01-16 | End: 2019-01-31

## 2019-01-16 RX ORDER — HYDROXYPROGESTERONE CAPROATE 250 MG/ML
275 INJECTION INTRAMUSCULAR
Qty: 13.2 ML | Refills: 1
Start: 2019-01-16 | End: 2019-01-16

## 2019-01-16 NOTE — PROGRESS NOTES
Prior to injection, verified patient identity using patient's name and date of birth.  Due to injection administration, patient instructed to remain in clinic for 15 minutes  afterwards, and to report any adverse reaction to me immediately.    [place] 275 mg    Drug Amount Wasted:  None.  Vial/Syringe: Single use auto injector  Expiration Date:  4/2021    Adriana Linares, RN, BSN

## 2019-01-16 NOTE — TELEPHONE ENCOUNTER
We received a new shipment of patients [facility] for 275mg. Her current order is for 250mg. Per the 11/15/18 encounter patient was supposed to be on the 275mg originally but this was out of stock. Can you please place a new order for [facility] 275mg. Patient has an appointment at 2pm today.   Tabitha Winters CMA

## 2019-01-23 ENCOUNTER — ALLIED HEALTH/NURSE VISIT (OUTPATIENT)
Dept: FAMILY MEDICINE | Facility: OTHER | Age: 29
End: 2019-01-23
Payer: COMMERCIAL

## 2019-01-23 DIAGNOSIS — Z87.51 HISTORY OF PRETERM DELIVERY: Primary | ICD-10-CM

## 2019-01-23 PROCEDURE — 96372 THER/PROPH/DIAG INJ SC/IM: CPT

## 2019-01-23 PROCEDURE — 99207 ZZC NO CHARGE NURSE ONLY: CPT

## 2019-01-23 NOTE — PROGRESS NOTES
Prior to injection, verified patient identity using patient's name and date of birth.  Due to injection administration, patient instructed to remain in clinic for 15 minutes  afterwards, and to report any adverse reaction to me immediately.    [place]    Drug Amount Wasted:  None.  Vial/Syringe: Single dose vial auto-inject   Expiration Date:  4/2021    Tabitha Winters CMA

## 2019-01-24 ENCOUNTER — PRENATAL OFFICE VISIT (OUTPATIENT)
Dept: OBGYN | Facility: OTHER | Age: 29
End: 2019-01-24
Payer: COMMERCIAL

## 2019-01-24 VITALS
HEART RATE: 84 BPM | BODY MASS INDEX: 25.98 KG/M2 | DIASTOLIC BLOOD PRESSURE: 70 MMHG | SYSTOLIC BLOOD PRESSURE: 100 MMHG | WEIGHT: 143.5 LBS

## 2019-01-24 DIAGNOSIS — O09.899 HISTORY OF PRETERM DELIVERY, CURRENTLY PREGNANT: Primary | ICD-10-CM

## 2019-01-24 PROCEDURE — 99207 ZZC COMPLICATED OB VISIT: CPT | Performed by: OBSTETRICS & GYNECOLOGY

## 2019-01-24 NOTE — PROGRESS NOTES
Presents for routine  appointment.     No complaints aside from continued pelvic pressure and stabbing sensation.    No LOF/VB/Ctxs.    ROS:   and GI  negative.     Please see Prenatal Vitals and Notes Flowsheet for objective data.    A/P:  29 year old  at 36w3d       ICD-10-CM    1. History of  delivery, currently pregnant O09.219        Group B Strep negative 19  History of PTD - s/p 17OHP   Cymbalta - continue  History of SGA in prior pregnancy with normal recent growth US  Discussed labor and what to expect. Discussed when to go to the birth center.  Follow up in 1 week.      Diane Dolan MD

## 2019-01-24 NOTE — NURSING NOTE
"Chief Complaint   Patient presents with     Prenatal Care       Initial /70 (BP Location: Right arm, Patient Position: Chair, Cuff Size: Adult Regular)   Pulse 84   Wt 65.1 kg (143 lb 8 oz)   LMP 2018 (Exact Date)   BMI 25.98 kg/m   Estimated body mass index is 25.57 kg/m  as calculated from the following:    Height as of 18: 1.583 m (5' 2.32\").    Weight as of 19: 64.1 kg (141 lb 4 oz).  BP completed using cuff size: regular        Tia Fry, RAMIRO  2019          "

## 2019-01-31 ENCOUNTER — PRENATAL OFFICE VISIT (OUTPATIENT)
Dept: OBGYN | Facility: OTHER | Age: 29
End: 2019-01-31
Payer: COMMERCIAL

## 2019-01-31 VITALS
DIASTOLIC BLOOD PRESSURE: 77 MMHG | SYSTOLIC BLOOD PRESSURE: 113 MMHG | TEMPERATURE: 97 F | WEIGHT: 144.75 LBS | BODY MASS INDEX: 26.2 KG/M2 | OXYGEN SATURATION: 98 %

## 2019-01-31 DIAGNOSIS — O09.899 HISTORY OF PRETERM DELIVERY, CURRENTLY PREGNANT: Primary | ICD-10-CM

## 2019-01-31 PROCEDURE — 99207 ZZC COMPLICATED OB VISIT: CPT | Performed by: OBSTETRICS & GYNECOLOGY

## 2019-01-31 NOTE — PROGRESS NOTES
Presents for routine  appointment.    She lost her mucous plug today.  She started rene again at about 4 am.  Contractions are 7 min apart.  She has had problems with contractions for the last few weeks, but this was better last week.  She is feeling the contractions more in her back.    No LOF/VB.    ROS:   and GI  negative.     Please see Prenatal Vitals and Notes Flowsheet for objective data.    A/P:  29 year old  at 37w3d       ICD-10-CM    1. History of  delivery, currently pregnant O09.219        Group B Strep negative 19  History of PTD - s/p 17OHP   Cymbalta - continue  History of SGA in prior pregnancy with normal recent growth US  Labor precautions given   Follow up in 1 week.      Diane Dolan MD

## 2019-02-03 ENCOUNTER — TRANSFERRED RECORDS (OUTPATIENT)
Dept: HEALTH INFORMATION MANAGEMENT | Facility: CLINIC | Age: 29
End: 2019-02-03

## 2019-02-08 ENCOUNTER — OFFICE VISIT (OUTPATIENT)
Dept: OBGYN | Facility: OTHER | Age: 29
End: 2019-02-08
Payer: COMMERCIAL

## 2019-02-08 VITALS
SYSTOLIC BLOOD PRESSURE: 122 MMHG | DIASTOLIC BLOOD PRESSURE: 84 MMHG | HEART RATE: 82 BPM | BODY MASS INDEX: 24.89 KG/M2 | TEMPERATURE: 98.2 F | WEIGHT: 137.5 LBS

## 2019-02-08 DIAGNOSIS — O92.79 ENGORGEMENT OF BREASTS, POSTPARTUM CONDITION OR COMPLICATION: Primary | ICD-10-CM

## 2019-02-08 PROCEDURE — 99213 OFFICE O/P EST LOW 20 MIN: CPT | Performed by: ADVANCED PRACTICE MIDWIFE

## 2019-02-08 RX ORDER — CLINDAMYCIN HCL 300 MG
300 CAPSULE ORAL 3 TIMES DAILY
Qty: 21 CAPSULE | Refills: 0 | Status: SHIPPED | OUTPATIENT
Start: 2019-02-08 | End: 2019-07-24

## 2019-02-08 NOTE — PROGRESS NOTES
Lizzie Oconnell is a 29 year old who presents to the clinic for evaluation of breast engorgement.    about 4 days ago.   Pumped for her last baby that was a premie so feels very new to breast feeding.  Feels that she has PTSD related to the pump and is also worried about over production of milk.  Also had mastitis twice during her last breast feeding experience.   Reports temp of 99.1 at home.  Breasts are engorged.  Has been breast feeding on both sides at a feed.   Bleeding is minimal   No issues with urination.   Has pain from her epidural site.  Reports three attempts with catheters placed and removed.        Histories reviewed and updated  Past Medical History:   Diagnosis Date     Anxiety 2018     Depressive disorder      Pregnancy      Past Surgical History:   Procedure Laterality Date     CHOLECYSTECTOMY       GALLBLADDER SURGERY       Social History     Socioeconomic History     Marital status:      Spouse name: Not on file     Number of children: Not on file     Years of education: Not on file     Highest education level: Not on file   Social Needs     Financial resource strain: Not on file     Food insecurity - worry: Not on file     Food insecurity - inability: Not on file     Transportation needs - medical: Not on file     Transportation needs - non-medical: Not on file   Occupational History     Not on file   Tobacco Use     Smoking status: Never Smoker     Smokeless tobacco: Never Used   Substance and Sexual Activity     Alcohol use: No     Drug use: No     Sexual activity: Yes     Partners: Male     Comment: pregnant   Other Topics Concern     Parent/sibling w/ CABG, MI or angioplasty before 65F 55M? Not Asked   Social History Narrative     Not on file     Family History   Problem Relation Age of Onset     Anxiety Disorder Mother      Depression Mother      Anxiety Disorder Brother      Depression Brother         ROS: 10 point ROS neg other than the symptoms noted above in the  HPI.      EXAM:  /84   Pulse 82   Temp 98.2  F (36.8  C) (Temporal)   Wt 62.4 kg (137 lb 8 oz)   LMP 05/14/2018 (Exact Date)   Breastfeeding? Yes   BMI 24.89 kg/m      Breasts: engorged.  No redness or heat.  No palpable masses but both are significantly engorged.       ASSESSMENT/PLAN:    (O92.79) Engorgement of breasts, postpartum condition or complication  (primary encounter diagnosis)  Comment:   Plan: clindamycin (CLEOCIN) 300 MG capsule            Very worried about over production and being 'trapped' pumping.  And so is opposed to using a breast pump.   Has not been emptying one breast per feed then going to the opposite breast at the next feeding.  Has been doing some hand expression and hot showers.   She does not have mastitis at this point but is engorged.   Encouraged to use one breast per feeding for now until the baby gets big enough to empty both breasts.   Reviewed fore milk and hind milk and to pump or hand express until the breast is empty/soft and stop.   Discussed concerns for the use of antibiotics, potential for yeast infection  Will provide with antibiotic prescription to cover her for the weekend if she does develop mastitis.  Will call if she doesn't improve  Has a follow up with lactation for Monday and is trying to get in today.

## 2019-02-22 ENCOUNTER — TELEPHONE (OUTPATIENT)
Dept: OBGYN | Facility: OTHER | Age: 29
End: 2019-02-22

## 2019-02-22 NOTE — TELEPHONE ENCOUNTER
Informed pharmacy medication is no longer needed. Encounter closed.      Nani Denney CMA  February 22, 2019  1:27 PM

## 2019-02-22 NOTE — TELEPHONE ENCOUNTER
Reason for Call:  Other call back    Detailed comments: [facility] called clinic. They are wanting to verify shipment - is Makenne injection still needed. Please call.     Phone Number: [phone] option 2    Best Time: any    Can we leave a detailed message on this number? Not Applicable    Call taken on 2/22/2019 at 12:56 PM by Alek Kwong

## 2019-07-23 NOTE — PROGRESS NOTES
Subjective     Lizzie Oconnell is a 29 year old female who presents to clinic today for the following health issues:    HPI   Concern - Left leg sun rash  Onset: yesterday     Description:   Had a rash after being in the sun, lasted about an hour. Pt has pictures of this rash as it's gone now. Pt has some autoimmune concerns that she would like to talk about today. Pt was also feeling nauseated and light headed at the time. Today pt is having joint pain in fingers, arms and knees.     Intensity: moderate    Progression of Symptoms:  same    Patient states she has history of intermittent rashes and multiple joint pain will follow rash that typically occurs after exposure to sun in summer time.   She has been worked up through rheumatologist and was told to return to clinic when symptoms are flaring.   She states she has had malar rash and multiple joint pain with pos TELLO and negative titer.     Patient Active Problem List   Diagnosis     Pain in joint, multiple sites     TYREL (generalized anxiety disorder)     Mild major depression (H)     Panic attack     History of prior pregnancy with SGA      History of  delivery, currently pregnant     Migraines     Dysmenorrhea     Past Surgical History:   Procedure Laterality Date     CHOLECYSTECTOMY       GALLBLADDER SURGERY         Social History     Tobacco Use     Smoking status: Never Smoker     Smokeless tobacco: Never Used   Substance Use Topics     Alcohol use: No     Family History   Problem Relation Age of Onset     Anxiety Disorder Mother      Depression Mother      Anxiety Disorder Brother      Depression Brother          Current Outpatient Medications   Medication Sig Dispense Refill     DULoxetine (CYMBALTA) 20 MG capsule TAKE 1 CAPSULE BY MOUTH EVERY DAY 90 capsule 3     ondansetron (ZOFRAN) 8 MG tablet Take 1 tablet (8 mg) by mouth every 8 hours as needed for nausea 30 tablet 1     Allergies   Allergen Reactions     Aminothiols      Amoxicillin       Compazine [Prochlorperazine]      Penicillins      Recent Labs   Lab Test 02/28/18  1638 08/20/14 02/25/14 02/22/14 02/21/14 02/11/14   ALT  --   --  132 226 154 38   CR  --   --   --   --  0.8 0.78   GFRESTIMATED  --   --   --   --  >60 >60   GFRESTBLACK  --   --   --   --  >60 >60   POTASSIUM  --   --   --  4.1 3.2 4   TSH 2.00 2.744  --   --   --   --       BP Readings from Last 3 Encounters:   07/24/19 118/70   02/08/19 122/84   01/31/19 113/77    Wt Readings from Last 3 Encounters:   07/24/19 52.6 kg (116 lb)   02/08/19 62.4 kg (137 lb 8 oz)   01/31/19 65.7 kg (144 lb 12 oz)                    Reviewed and updated as needed this visit by Provider         Review of Systems   ROS COMP: Constitutional, HEENT, cardiovascular, pulmonary, GI, , musculoskeletal, neuro, skin, endocrine and psych systems are negative, except as otherwise noted.      Objective    There were no vitals taken for this visit.  There is no height or weight on file to calculate BMI.  Physical Exam   GENERAL: healthy, alert and no distress  EYES: Eyes grossly normal to inspection, PERRL and conjunctivae and sclerae normal  NECK: no adenopathy, no asymmetry, masses, or scars and thyroid normal to palpation  RESP: lungs clear to auscultation - no rales, rhonchi or wheezes  CV: regular rate and rhythm, normal S1 S2, no S3 or S4, no murmur, click or rub, no peripheral edema and peripheral pulses strong  SKIN: no suspicious lesions or rashes      Assessment & Plan     1. Multiple joint pain    - Anti Nuclear Lary IgG by IFA with Reflex  - ESR: Erythrocyte sedimentation rate  - CRP, inflammation  - Rheumatoid factor  - Cyclic Citrullinated Peptide Antibody IgG  - RHEUMATOLOGY REFERRAL    2. Malar rash    - Anti Nuclear Lary IgG by IFA with Reflex  - ESR: Erythrocyte sedimentation rate  - CRP, inflammation  - Rheumatoid factor  - Cyclic Citrullinated Peptide Antibody IgG  - RHEUMATOLOGY REFERRAL       Will draw labs today and schedule follow up  with rheumatology for further evaluation.     Patient Instructions   Will call with lab results   Please follow up with rheumatology.    Thank you  [facility]    Answers for HPI/ROS submitted by the patient on 7/24/2019   PHQ9 TOTAL SCORE: 0  TYREL 7 TOTAL SCORE: 4

## 2019-07-24 ENCOUNTER — OFFICE VISIT (OUTPATIENT)
Dept: FAMILY MEDICINE | Facility: OTHER | Age: 29
End: 2019-07-24
Payer: COMMERCIAL

## 2019-07-24 VITALS
RESPIRATION RATE: 16 BRPM | HEART RATE: 90 BPM | WEIGHT: 116 LBS | TEMPERATURE: 98.5 F | DIASTOLIC BLOOD PRESSURE: 70 MMHG | SYSTOLIC BLOOD PRESSURE: 118 MMHG | OXYGEN SATURATION: 98 % | BODY MASS INDEX: 21 KG/M2

## 2019-07-24 DIAGNOSIS — M25.50 MULTIPLE JOINT PAIN: Primary | ICD-10-CM

## 2019-07-24 DIAGNOSIS — R21 MALAR RASH: ICD-10-CM

## 2019-07-24 PROBLEM — N94.6 DYSMENORRHEA: Status: ACTIVE | Noted: 2019-07-24

## 2019-07-24 PROBLEM — G43.909 MIGRAINES: Status: ACTIVE | Noted: 2019-07-24

## 2019-07-24 LAB
CRP SERPL-MCNC: <2.9 MG/L (ref 0–8)
ERYTHROCYTE [SEDIMENTATION RATE] IN BLOOD BY WESTERGREN METHOD: 9 MM/H (ref 0–20)

## 2019-07-24 PROCEDURE — 85652 RBC SED RATE AUTOMATED: CPT | Performed by: NURSE PRACTITIONER

## 2019-07-24 PROCEDURE — 36415 COLL VENOUS BLD VENIPUNCTURE: CPT | Performed by: NURSE PRACTITIONER

## 2019-07-24 PROCEDURE — 86200 CCP ANTIBODY: CPT | Performed by: NURSE PRACTITIONER

## 2019-07-24 PROCEDURE — 86431 RHEUMATOID FACTOR QUANT: CPT | Performed by: NURSE PRACTITIONER

## 2019-07-24 PROCEDURE — 86038 ANTINUCLEAR ANTIBODIES: CPT | Performed by: NURSE PRACTITIONER

## 2019-07-24 PROCEDURE — 99213 OFFICE O/P EST LOW 20 MIN: CPT | Performed by: NURSE PRACTITIONER

## 2019-07-24 PROCEDURE — 86140 C-REACTIVE PROTEIN: CPT | Performed by: NURSE PRACTITIONER

## 2019-07-24 ASSESSMENT — ANXIETY QUESTIONNAIRES
GAD7 TOTAL SCORE: 4
7. FEELING AFRAID AS IF SOMETHING AWFUL MIGHT HAPPEN: SEVERAL DAYS
GAD7 TOTAL SCORE: 4
6. BECOMING EASILY ANNOYED OR IRRITABLE: NOT AT ALL
7. FEELING AFRAID AS IF SOMETHING AWFUL MIGHT HAPPEN: SEVERAL DAYS
3. WORRYING TOO MUCH ABOUT DIFFERENT THINGS: SEVERAL DAYS
4. TROUBLE RELAXING: NOT AT ALL
1. FEELING NERVOUS, ANXIOUS, OR ON EDGE: SEVERAL DAYS
2. NOT BEING ABLE TO STOP OR CONTROL WORRYING: SEVERAL DAYS
5. BEING SO RESTLESS THAT IT IS HARD TO SIT STILL: NOT AT ALL
GAD7 TOTAL SCORE: 4

## 2019-07-24 ASSESSMENT — PATIENT HEALTH QUESTIONNAIRE - PHQ9
SUM OF ALL RESPONSES TO PHQ QUESTIONS 1-9: 0
SUM OF ALL RESPONSES TO PHQ QUESTIONS 1-9: 0

## 2019-07-24 NOTE — PATIENT INSTRUCTIONS
Will call with lab results   Please follow up with rheumatology.    Thank you  B[place] CNP

## 2019-07-25 LAB
ANA SER QL IF: NEGATIVE
CCP AB SER IA-ACNC: 1 U/ML
RHEUMATOID FACT SER NEPH-ACNC: <20 IU/ML (ref 0–20)

## 2019-07-25 ASSESSMENT — PATIENT HEALTH QUESTIONNAIRE - PHQ9: SUM OF ALL RESPONSES TO PHQ QUESTIONS 1-9: 0

## 2019-07-25 ASSESSMENT — ANXIETY QUESTIONNAIRES: GAD7 TOTAL SCORE: 4

## 2019-07-26 NOTE — RESULT ENCOUNTER NOTE
Please advise Lizzie Oconnell,  1990, that her lab results were normal workup; normal TELLO, CCP antibody, RF factor, CRP and ESR (inflamatory markers) please follow up with rheumatologist as discussed.   [phone] (home)   Thank you  B[place] CNP

## 2019-09-24 ENCOUNTER — OFFICE VISIT (OUTPATIENT)
Dept: FAMILY MEDICINE | Facility: OTHER | Age: 29
End: 2019-09-24
Payer: COMMERCIAL

## 2019-09-24 VITALS
DIASTOLIC BLOOD PRESSURE: 76 MMHG | OXYGEN SATURATION: 99 % | RESPIRATION RATE: 16 BRPM | HEART RATE: 110 BPM | SYSTOLIC BLOOD PRESSURE: 110 MMHG | TEMPERATURE: 98.1 F

## 2019-09-24 DIAGNOSIS — J06.9 VIRAL URI WITH COUGH: ICD-10-CM

## 2019-09-24 DIAGNOSIS — R07.0 THROAT PAIN: Primary | ICD-10-CM

## 2019-09-24 LAB
DEPRECATED S PYO AG THROAT QL EIA: NORMAL
SPECIMEN SOURCE: NORMAL

## 2019-09-24 PROCEDURE — 87880 STREP A ASSAY W/OPTIC: CPT | Performed by: PHYSICIAN ASSISTANT

## 2019-09-24 PROCEDURE — 87081 CULTURE SCREEN ONLY: CPT | Performed by: PHYSICIAN ASSISTANT

## 2019-09-24 PROCEDURE — 99213 OFFICE O/P EST LOW 20 MIN: CPT | Performed by: PHYSICIAN ASSISTANT

## 2019-09-24 ASSESSMENT — PAIN SCALES - GENERAL: PAINLEVEL: SEVERE PAIN (6)

## 2019-09-24 NOTE — PROGRESS NOTES
Subjective     Lizzie Oconnell is a 29 year old female who presents to clinic today for the following health issues:    HPI   Acute Illness   Acute illness concerns: Sore throat, cough  Onset:     Fever: no    Chills/Sweats: YES    Headache (location?): YES    Sinus Pressure:YES    Conjunctivitis:  YES-     Ear Pain: YES: left    Rhinorrhea: no    Congestion: YES    Sore Throat: YES     Cough: YES - Non productive    Wheeze: no    Decreased Appetite: no    Nausea: no    Vomiting: no    Diarrhea:  no    Dysuria/Freq.: no    Fatigue/Achiness: YES    Sick/Strep Exposure: YES     Therapies Tried and outcome: tylenol and ibuprofen, vicks, vaporizer    Patient Active Problem List   Diagnosis     Pain in joint, multiple sites     TYREL (generalized anxiety disorder)     Mild major depression (H)     Panic attack     History of prior pregnancy with SGA      History of  delivery, currently pregnant     Migraines     Dysmenorrhea     Past Surgical History:   Procedure Laterality Date     CHOLECYSTECTOMY       GALLBLADDER SURGERY         Social History     Tobacco Use     Smoking status: Never Smoker     Smokeless tobacco: Never Used   Substance Use Topics     Alcohol use: No     Family History   Problem Relation Age of Onset     Anxiety Disorder Mother      Depression Mother      Anxiety Disorder Brother      Depression Brother          Current Outpatient Medications   Medication Sig Dispense Refill     DULoxetine (CYMBALTA) 20 MG capsule TAKE 1 CAPSULE BY MOUTH EVERY DAY 90 capsule 3     ondansetron (ZOFRAN) 8 MG tablet Take 1 tablet (8 mg) by mouth every 8 hours as needed for nausea 30 tablet 1     Allergies   Allergen Reactions     Aminothiols      Amoxicillin      Compazine [Prochlorperazine]      Penicillins      BP Readings from Last 3 Encounters:   19 110/76   19 118/70   19 122/84    Wt Readings from Last 3 Encounters:   19 (P) 53.4 kg (117 lb 12.8 oz)   19 52.6 kg (116  lb)   02/08/19 62.4 kg (137 lb 8 oz)                    Reviewed and updated as needed this visit by Provider         Review of Systems   ROS COMP: Constitutional, HEENT, cardiovascular, pulmonary, GI, , musculoskeletal, neuro, skin, endocrine and psych systems are negative, except as otherwise noted.      Objective    /76   Pulse 110   Temp 98.1  F (36.7  C) (Temporal)   Resp 16   Wt (P) 53.4 kg (117 lb 12.8 oz)   SpO2 99%   BMI (P) 21.32 kg/m    Body mass index is 21.32 kg/m  (pended).  Physical Exam   GENERAL: healthy, alert and no distress  HENT: normal cephalic/atraumatic, ear canals and TM's normal, nose and mouth without ulcers or lesions, nasal mucosa edematous , rhinorrhea clear, oropharynx clear, oral mucous membranes moist and tonsillar erythema  NECK: bilateral anterior cervical adenopathy, no asymmetry, masses, or scars and trachea midline and normal to palpation  RESP: lungs clear to auscultation - no rales, rhonchi or wheezes  CV: regular rate and rhythm, normal S1 S2, no S3 or S4, no murmur, click or rub, no peripheral edema and peripheral pulses strong  ABDOMEN: soft, nontender, no hepatosplenomegaly, no masses and bowel sounds normal  MS: no gross musculoskeletal defects noted, no edema  SKIN: no suspicious lesions or rashes to visible skin  NEURO: Normal strength and tone, mentation intact and speech normal  PSYCH: mentation appears normal, affect normal/bright    Diagnostic Test Results:  Labs reviewed in Epic  Results for orders placed or performed in visit on 09/24/19 (from the past 24 hour(s))   Rapid strep screen   Result Value Ref Range    Specimen Description Throat     Rapid Strep A Screen       NEGATIVE: No Group A streptococcal antigen detected by immunoassay, await culture report.           Assessment & Plan     1. Throat pain  2. Viral URI with cough - at this time it appears to be viral in nature - treat as such.  - Rapid strep screen  - Beta strep group A  culture    Work on weight loss  Regular exercise    Return in about 2 weeks (around 10/8/2019) for recheck of current condition, Medication Recheck.    Julio C Samuels PA-C  [facility]

## 2019-09-25 ENCOUNTER — TELEPHONE (OUTPATIENT)
Dept: FAMILY MEDICINE | Facility: OTHER | Age: 29
End: 2019-09-25

## 2019-09-25 LAB
BACTERIA SPEC CULT: NORMAL
SPECIMEN SOURCE: NORMAL

## 2019-09-25 NOTE — TELEPHONE ENCOUNTER
Reason for Call:  Medication or medication refill:    Do you use a [facility]?  Name of the pharmacy and phone number for the current request[facility]    Name of the medication requested: zpack    Other request: Patient was seen by Vitor Diamond yesterday and he wanted to hold off treating the throat pain but patient states it is worse today and is requesting a zpack. Please call and advise.     Can we leave a detailed message on this number? YES    Phone number patient can be reached at: Cell number on file:    Telephone Information:   Mobile [phone]       Best Time: any    Call taken on 9/25/2019 at 2:44 PM by Tish Valderrama

## 2019-09-25 NOTE — TELEPHONE ENCOUNTER
Patient called and I advised her of the message. I stated that Julio C is out of clinic and it would have to wait for his review if this is appropriate.     Roxana Puente MA

## 2019-09-25 NOTE — TELEPHONE ENCOUNTER
"I do not see reference to a \"z-pack\" in ov note. Appears HEATH thought illness was indicative of a viral infection which would be likely as his strep tests (rapid and culture) are negative.     B[place] CNP    "

## 2019-09-26 NOTE — TELEPHONE ENCOUNTER
The back-up strep was negative as well.  Therefore the Zithromax/Z-Evan will most likely not help with this.  It is more likely viral in nature.  Electronically signed:    Julio C Samuels PA-C

## 2019-10-16 DIAGNOSIS — O21.0 HYPEREMESIS GRAVIDARUM: ICD-10-CM

## 2019-10-16 NOTE — TELEPHONE ENCOUNTER
" Antivertigo/Antiemetic Agents Failed   ondansetron (ZOFRAN) 8 MG tablet   Rerun Protocol (10/16/2019 11:00 AM)      Recent (12 mo) or future (30 days) visit within the authorizing provider's specialty      Patient has had an office visit with the authorizing provider or a provider within the authorizing providers department within the previous 12 mos or has a future within next 30 days. See \"Patient Info\" tab in inbasket, or \"Choose Columns\" in Meds & Orders section of the refill encounter.              Medication is active on med list         Patient is 18 years of age or older               Last written prescription date: 10/15/2018        Last fill quantity: 30, # refills: 1        Last office visit: Patient is due for annual exam, was seen in last year for prenatal care. Post partum visit was in Feb 2019.          Future office visit: Nothing scheduled.    Routing refill request to provider for review/approval.     Leora Gimenez RN on 10/16/2019 at 11:08 AM                   "

## 2019-10-17 RX ORDER — ONDANSETRON 8 MG/1
8 TABLET, FILM COATED ORAL EVERY 8 HOURS PRN
Qty: 30 TABLET | Refills: 1 | Status: SHIPPED | OUTPATIENT
Start: 2019-10-17 | End: 2020-12-01

## 2019-11-02 ENCOUNTER — HEALTH MAINTENANCE LETTER (OUTPATIENT)
Age: 29
End: 2019-11-02

## 2019-11-21 ENCOUNTER — PRENATAL OFFICE VISIT (OUTPATIENT)
Dept: OBGYN | Facility: OTHER | Age: 29
End: 2019-11-21
Payer: COMMERCIAL

## 2019-11-21 ENCOUNTER — MYC MEDICAL ADVICE (OUTPATIENT)
Dept: OBGYN | Facility: OTHER | Age: 29
End: 2019-11-21

## 2019-11-21 VITALS
SYSTOLIC BLOOD PRESSURE: 120 MMHG | WEIGHT: 115.75 LBS | BODY MASS INDEX: 20.51 KG/M2 | HEART RATE: 68 BPM | DIASTOLIC BLOOD PRESSURE: 78 MMHG | HEIGHT: 63 IN

## 2019-11-21 DIAGNOSIS — O09.899 HISTORY OF PRETERM DELIVERY, CURRENTLY PREGNANT: Primary | ICD-10-CM

## 2019-11-21 DIAGNOSIS — Z87.59 HISTORY OF PRIOR PREGNANCY WITH SGA NEWBORN: ICD-10-CM

## 2019-11-21 LAB
ABO + RH BLD: NORMAL
ABO + RH BLD: NORMAL
ALBUMIN UR-MCNC: NEGATIVE MG/DL
APPEARANCE UR: CLEAR
BILIRUB UR QL STRIP: NEGATIVE
BLD GP AB SCN SERPL QL: NORMAL
BLOOD BANK CMNT PATIENT-IMP: NORMAL
COLOR UR AUTO: YELLOW
ERYTHROCYTE [DISTWIDTH] IN BLOOD BY AUTOMATED COUNT: 13.1 % (ref 10–15)
GLUCOSE UR STRIP-MCNC: NEGATIVE MG/DL
HCT VFR BLD AUTO: 38.8 % (ref 35–47)
HGB BLD-MCNC: 12.6 G/DL (ref 11.7–15.7)
HGB UR QL STRIP: NEGATIVE
KETONES UR STRIP-MCNC: NEGATIVE MG/DL
LEUKOCYTE ESTERASE UR QL STRIP: NEGATIVE
MCH RBC QN AUTO: 29.2 PG (ref 26.5–33)
MCHC RBC AUTO-ENTMCNC: 32.5 G/DL (ref 31.5–36.5)
MCV RBC AUTO: 90 FL (ref 78–100)
NITRATE UR QL: NEGATIVE
PH UR STRIP: 5.5 PH (ref 5–7)
PLATELET # BLD AUTO: 377 10E9/L (ref 150–450)
RBC # BLD AUTO: 4.31 10E12/L (ref 3.8–5.2)
SOURCE: NORMAL
SP GR UR STRIP: >1.03 (ref 1–1.03)
SPECIMEN EXP DATE BLD: NORMAL
UROBILINOGEN UR STRIP-ACNC: 0.2 EU/DL (ref 0.2–1)
WBC # BLD AUTO: 8.6 10E9/L (ref 4–11)

## 2019-11-21 PROCEDURE — 85027 COMPLETE CBC AUTOMATED: CPT | Performed by: OBSTETRICS & GYNECOLOGY

## 2019-11-21 PROCEDURE — 99207 ZZC FIRST OB VISIT: CPT | Performed by: OBSTETRICS & GYNECOLOGY

## 2019-11-21 PROCEDURE — 86780 TREPONEMA PALLIDUM: CPT | Performed by: OBSTETRICS & GYNECOLOGY

## 2019-11-21 PROCEDURE — 86900 BLOOD TYPING SEROLOGIC ABO: CPT | Performed by: OBSTETRICS & GYNECOLOGY

## 2019-11-21 PROCEDURE — 87624 HPV HI-RISK TYP POOLED RSLT: CPT | Performed by: OBSTETRICS & GYNECOLOGY

## 2019-11-21 PROCEDURE — 86762 RUBELLA ANTIBODY: CPT | Performed by: OBSTETRICS & GYNECOLOGY

## 2019-11-21 PROCEDURE — 86850 RBC ANTIBODY SCREEN: CPT | Performed by: OBSTETRICS & GYNECOLOGY

## 2019-11-21 PROCEDURE — 81003 URINALYSIS AUTO W/O SCOPE: CPT | Performed by: OBSTETRICS & GYNECOLOGY

## 2019-11-21 PROCEDURE — G0145 SCR C/V CYTO,THINLAYER,RESCR: HCPCS | Performed by: OBSTETRICS & GYNECOLOGY

## 2019-11-21 PROCEDURE — 87086 URINE CULTURE/COLONY COUNT: CPT | Performed by: OBSTETRICS & GYNECOLOGY

## 2019-11-21 PROCEDURE — 87340 HEPATITIS B SURFACE AG IA: CPT | Performed by: OBSTETRICS & GYNECOLOGY

## 2019-11-21 PROCEDURE — 87389 HIV-1 AG W/HIV-1&-2 AB AG IA: CPT | Performed by: OBSTETRICS & GYNECOLOGY

## 2019-11-21 PROCEDURE — 36415 COLL VENOUS BLD VENIPUNCTURE: CPT | Performed by: OBSTETRICS & GYNECOLOGY

## 2019-11-21 PROCEDURE — 86901 BLOOD TYPING SEROLOGIC RH(D): CPT | Performed by: OBSTETRICS & GYNECOLOGY

## 2019-11-21 RX ORDER — PRENATAL VIT/IRON FUM/FOLIC AC 27MG-0.8MG
1 TABLET ORAL DAILY
COMMUNITY
End: 2022-09-12

## 2019-11-21 ASSESSMENT — MIFFLIN-ST. JEOR: SCORE: 1221.23

## 2019-11-21 NOTE — PROGRESS NOTES
29 year old  at 8w5d presents for New OB appointment.  Estimated Date of Delivery: 2020 by LMP, which she is sure of    No complaints aside from nausea.   No vaginal bleeding, no leaking fluid, no contractions.      Electronic chart, including labs and imaging reviewed.    Last PHQ-9 score on record=   PHQ-9 SCORE 2019   PHQ-9 Total Score MyChart 0   PHQ-9 Total Score 0       Obstetric History  OB History    Para Term  AB Living   5 3 2 1 1 3   SAB TAB Ectopic Multiple Live Births   0 0 0 0 3      # Outcome Date GA Lbr Chu/2nd Weight Sex Delivery Anes PTL Lv   5 Current            4 Term 19 38w0d  2.821 kg (6 lb 3.5 oz) M  EPI  DESIREE      Name: Ishaan Zarate  16 34w0d  2.126 kg (4 lb 11 oz) M    DESIREE      Complications:  premature rupture of membranes (PPROM) with onset of labor after 24 hours of rupture in third trimester, antepartum   2 Term 13 39w0d  2.495 kg (5 lb 8 oz) F   N DESIREE      Complications: SGA (small for gestational age)   1 AB                  Most Recent Immunizations   Administered Date(s) Administered     HepB 2004     Hib (PRP-T) 1992     Historical DTP/aP 1999     MMR 10/11/2001     OPV, trivalent, live 1992     Poliovirus, inactivated (IPV) 2001     TD (ADULT, 7+) 1999     Varicella 2001        Patient Active Problem List   Diagnosis     Pain in joint, multiple sites     TYREL (generalized anxiety disorder)     Mild major depression (H)     Panic attack     History of prior pregnancy with SGA      History of  delivery, currently pregnant     Migraines     Dysmenorrhea       Current Outpatient Medications   Medication     DULoxetine (CYMBALTA) 20 MG capsule     ondansetron (ZOFRAN) 8 MG tablet     Prenatal Vit-Fe Fumarate-FA (PRENATAL MULTIVITAMIN W/IRON) 27-0.8 MG tablet     No current facility-administered medications for this visit.        Allergies   Allergen  "Reactions     Aminothiols      Amoxicillin      Compazine [Prochlorperazine]      Penicillins        History  Past Medical History:   Diagnosis Date     Anxiety 03/2018     Depressive disorder      Pregnancy      Past Surgical History:   Procedure Laterality Date     CHOLECYSTECTOMY       GALLBLADDER SURGERY         Social History     Socioeconomic History     Marital status:      Spouse name: Not on file     Number of children: Not on file     Years of education: Not on file     Highest education level: Not on file   Occupational History     Not on file   Social Needs     Financial resource strain: Not on file     Food insecurity:     Worry: Not on file     Inability: Not on file     Transportation needs:     Medical: Not on file     Non-medical: Not on file   Tobacco Use     Smoking status: Never Smoker     Smokeless tobacco: Never Used   Substance and Sexual Activity     Alcohol use: No     Drug use: No     Sexual activity: Yes     Partners: Male     Comment: pregnant   Lifestyle     Physical activity:     Days per week: Not on file     Minutes per session: Not on file     Stress: Not on file   Relationships     Social connections:     Talks on phone: Not on file     Gets together: Not on file     Attends Jainism service: Not on file     Active member of club or organization: Not on file     Attends meetings of clubs or organizations: Not on file     Relationship status: Not on file     Intimate partner violence:     Fear of current or ex partner: Not on file     Emotionally abused: Not on file     Physically abused: Not on file     Forced sexual activity: Not on file   Other Topics Concern     Parent/sibling w/ CABG, MI or angioplasty before 65F 55M? Not Asked   Social History Narrative     Not on file       ROS - Please see HPI, otherwise 10pt ROS negative.      Physical Exam  Vitals: /78 (BP Location: Right arm, Cuff Size: Adult Regular)   Pulse 68   Ht 1.588 m (5' 2.5\")   Wt 52.5 kg (115 lb " 12 oz)   LMP 2019 (Exact Date)   BMI 20.83 kg/m    BMI= Body mass index is 20.83 kg/m .  Gen: Alert and oriented times 3, no acute distress.  Well developed, well nourished, pleasant.    Neck: Supple, no masses.  No thyromegaly.  Chest:  Non labored.  Clear to auscultation bilaterally.    Heart: Regular, normal S1, S2.  No murmurs.   Abdomen: Soft, nontender, nondistended.  No palpable masses.    :  Normal female external genitalia, Ayden stage V.  No lesions.  Speculum exam reveals a normal vaginal vault, normal cervix.  No abnormal discharge.  Bimanual exam reveals a normal, mobile, nontender uterus, size consistent with dates.  No cervical motion tenderness.  Adnexa nontender with no palpable masses.   Extremities:  Nontender, no edema.    Pap obtained Yes      Assessment and Plan:  29 year old  with IUP at 8w5d.      ICD-10-CM    1. History of  delivery, currently pregnant O09.219 ABO/Rh type and screen     HIV Antigen Antibody Combo     Rubella Antibody IgG Quantitative     Hepatitis B surface antigen     Treponema Abs w Reflex to RPR and Titer     Urine Culture Aerobic Bacterial     Pap imaged thin layer screen reflex to HPV if ASCUS - recommended age 25 - 29 years     UA reflex to Microscopic     CBC with platelets     US OB < 14 Weeks Single       Refer to M for CL given history of PTD at 34 weeks.  She will also discuss 17OHP with them at that time. She was on that for past pregnancy and delivered at term, but the shots were a stressful experience for her.  Difficulty with insurance coverage.   LDA beginning at 12 weeks until delivery for history of non-specified connective tissue disorder.  History of prior SGA  - serial growth US.   N&V of pregnancy discussed.  She will continue zofran prn.  Discussed alternative therapies.  Anxiety - cymbalta  Discussed genetic screening options:  declined  Ordered labs and ultrasound  Folder given, outlining physician coverage,  exercise, weight gain, schedule of visits, routine and indicated ultrasounds, prenatal vitamins and childbirth education.    Body mass index is 20.83 kg/m . - recommend 25-35 lbs (BMI 18.5-24.9)   Return in 4 weeks for routine OB care      25 minutes was spent face to face with the patient today discussing her history, diagnosis, and follow-up plan as noted above.  Over 50% of the visit was spent in counseling and coordination of care.    Diane Dolan MD FACOG

## 2019-11-21 NOTE — NURSING NOTE
"Chief Complaint   Patient presents with     Prenatal Care     New Prenatal       Initial /78 (BP Location: Right arm, Cuff Size: Adult Regular)   Pulse 68   Ht 1.588 m (5' 2.5\")   Wt 52.5 kg (115 lb 12 oz)   LMP 2019 (Exact Date)   BMI 20.83 kg/m   Estimated body mass index is 20.83 kg/m  as calculated from the following:    Height as of this encounter: 1.588 m (5' 2.5\").    Weight as of this encounter: 52.5 kg (115 lb 12 oz).  BP completed using cuff size: regular        PHQ-9 score:    PHQ-9 SCORE 2019   PHQ-9 Total Score MyChart 0   PHQ-9 Total Score 0       Diane Tabares MA on 2019 at 3:25 PM    "

## 2019-11-21 NOTE — LETTER
December 2, 2019    Lizzie Oconnell  [id number] [facility] [address]    Dear ,  This letter is regarding your recent Pap smear (cervical cancer screening) and Human Papillomavirus (HPV) test.  We are happy to inform you that your Pap smear result is normal. Cervical cancer is closely linked with certain types of HPV. Your results showed no evidence of high-risk HPV.  We recommend you have your next PAP smear in 3 years.  You will still need to return to the clinic every year for an annual exam and other preventive tests.  If you have additional questions regarding this result, please call our registered nurse, Amparo at [phone].  Sincerely,    Diane Dolan MD/diego

## 2019-11-22 LAB
BACTERIA SPEC CULT: NO GROWTH
HBV SURFACE AG SERPL QL IA: NONREACTIVE
HIV 1+2 AB+HIV1 P24 AG SERPL QL IA: NONREACTIVE
Lab: NORMAL
RUBV IGG SERPL IA-ACNC: 12 IU/ML
SPECIMEN SOURCE: NORMAL
T PALLIDUM AB SER QL: NONREACTIVE

## 2019-11-22 NOTE — TELEPHONE ENCOUNTER
Please call MFM and see if they will see her without the ultrasound for dating. She has a sure LMP.

## 2019-11-22 NOTE — TELEPHONE ENCOUNTER
EPIFANIO called to let us know that patient doesn't need to have an ultrasound before seeing them.  I informed Dr. Dolan and let patient know.  Diane Morse, [facility]  November 22, 2019 10:11 AM

## 2019-11-22 NOTE — TELEPHONE ENCOUNTER
Pt was seen yesterday for a new prenatal visit with Dr. Dolan.  Per OV plan:     Refer to MFM for CL given history of PTD at 34 weeks.  She will also discuss 17OHP with them at that time. She was on that for past pregnancy and delivered at term, but the shots were a stressful experience for her.  Difficulty with insurance coverage.   LDA beginning at 12 weeks until delivery for history of non-specified connective tissue disorder.  History of prior SGA  - serial growth US.   N&V of pregnancy discussed.  She will continue zofran prn.  Discussed alternative therapies.  Anxiety - cymbalta  Discussed genetic screening options:  declined  Ordered labs and ultrasound  Folder given, outlining physician coverage, exercise, weight gain, schedule of visits, routine and indicated ultrasounds, prenatal vitamins and childbirth education    Routing to Dr. Dolan for further recommendations.    Kimber Bolanos RN

## 2019-11-22 NOTE — TELEPHONE ENCOUNTER
Called [facility].  No answer so left a detailed message asking if it was okay for them to see pt without a dating US first.  Asked them to call [phone] and ask for women's clinic.    Kimber Bolanos RN

## 2019-11-26 LAB
COPATH REPORT: NORMAL
PAP: NORMAL

## 2019-11-27 LAB
FINAL DIAGNOSIS: NORMAL
HPV HR 12 DNA CVX QL NAA+PROBE: NEGATIVE
HPV16 DNA SPEC QL NAA+PROBE: NEGATIVE
HPV18 DNA SPEC QL NAA+PROBE: NEGATIVE
SPECIMEN DESCRIPTION: NORMAL
SPECIMEN SOURCE CVX/VAG CYTO: NORMAL

## 2019-12-04 ENCOUNTER — OFFICE VISIT (OUTPATIENT)
Dept: FAMILY MEDICINE | Facility: OTHER | Age: 29
End: 2019-12-04
Payer: COMMERCIAL

## 2019-12-04 VITALS
DIASTOLIC BLOOD PRESSURE: 60 MMHG | SYSTOLIC BLOOD PRESSURE: 102 MMHG | RESPIRATION RATE: 16 BRPM | HEART RATE: 82 BPM | TEMPERATURE: 98.3 F

## 2019-12-04 DIAGNOSIS — R07.0 THROAT PAIN: Primary | ICD-10-CM

## 2019-12-04 LAB
DEPRECATED S PYO AG THROAT QL EIA: NORMAL
SPECIMEN SOURCE: NORMAL

## 2019-12-04 PROCEDURE — 87081 CULTURE SCREEN ONLY: CPT | Performed by: PHYSICIAN ASSISTANT

## 2019-12-04 PROCEDURE — 87880 STREP A ASSAY W/OPTIC: CPT | Performed by: PHYSICIAN ASSISTANT

## 2019-12-04 PROCEDURE — 99213 OFFICE O/P EST LOW 20 MIN: CPT | Performed by: PHYSICIAN ASSISTANT

## 2019-12-04 NOTE — PROGRESS NOTES
Subjective     Lizzie Oconnell is a 29 year old female who presents to clinic today for the following health issues:    HPI   Acute Illness   Acute illness concerns: URI  Onset: Yesterday    Fever: no    Chills/Sweats: YES- Sweats    Headache (location?): no    Sinus Pressure:no    Conjunctivitis:  no    Ear Pain: YES: both    Rhinorrhea: no    Congestion: no    Sore Throat: YES    Rashes: no     Cough: no    Wheeze: no    Decreased Appetite: no    Nausea: YES    Vomiting: no    Diarrhea:  no    Dysuria/Freq.: no    Fatigue/Achiness: YES- Achiness    Sick/Strep Exposure: YES- Son     Therapies Tried and outcome: Tylenol    Daughter at home has strep.  Her other children have tested negatively.   She did have symptoms in September and was given azithromycin at that time.       Current Outpatient Medications   Medication Sig Dispense Refill     DULoxetine (CYMBALTA) 20 MG capsule TAKE 1 CAPSULE BY MOUTH EVERY DAY 90 capsule 3     Prenatal Vit-Fe Fumarate-FA (PRENATAL MULTIVITAMIN W/IRON) 27-0.8 MG tablet Take 1 tablet by mouth daily       ondansetron (ZOFRAN) 8 MG tablet Take 1 tablet (8 mg) by mouth every 8 hours as needed for nausea (Patient not taking: Reported on 12/4/2019) 30 tablet 1     Allergies   Allergen Reactions     Aminothiols      Amoxicillin      Compazine [Prochlorperazine]      Penicillins        Reviewed and updated as needed this visit by Provider  Tobacco  Allergies  Meds  Problems  Med Hx  Surg Hx  Fam Hx         Review of Systems   ROS COMP: Constitutional, HEENT, cardiovascular, pulmonary, gi and gu systems are negative, except as otherwise noted.      Objective    /60   Pulse 82   Temp 98.3  F (36.8  C) (Temporal)   Resp 16   LMP 09/21/2019 (Exact Date)   There is no height or weight on file to calculate BMI.  Physical Exam   GENERAL: healthy, alert and no distress  EYES: Eyes grossly normal to inspection, PERRL and conjunctivae and sclerae normal  HENT: normal  cephalic/atraumatic, ear canals and TM's normal, nose and mouth without ulcers or lesions, oral mucous membranes moist and posterior oropharynx is mildly erythematous, tonsils are normal size, no exudates noted, uvula is normal and midline.   NECK: no adenopathy, no asymmetry, masses, or scars and thyroid normal to palpation  RESP: lungs clear to auscultation - no rales, rhonchi or wheezes  CV: regular rate and rhythm, normal S1 S2, no S3 or S4, no murmur, click or rub, no peripheral edema and peripheral pulses strong  MS: no gross musculoskeletal defects noted, no edema    Diagnostic Test Results:  Labs reviewed in Epic  Results for orders placed or performed in visit on 12/04/19 (from the past 24 hour(s))   Strep, Rapid Screen   Result Value Ref Range    Specimen Description Throat     Rapid Strep A Screen       NEGATIVE: No Group A streptococcal antigen detected by immunoassay, await culture report.           Assessment & Plan       ICD-10-CM    1. Throat pain R07.0 Strep, Rapid Screen     Beta strep group A culture          Rapid strep is negative, throat culture is pending.  We will call if positive and treat appropriately.  She has had mono in the past, low suspicion at this point.  No signs on exam of tonsillitis or tonsillar cellulitis.  Recommended OTC therapies.     Return in about 5 days (around 12/9/2019) for If not improving, sooner if worse or new concerns.    Options for treatment and follow-up care were reviewed with the patient and/or guardian. Patient and/or guardian engaged in the decision making process and verbalized understanding of the options discussed and agreed with the final plan.    Surjit Ariza PA-C  [facility]

## 2019-12-05 LAB
BACTERIA SPEC CULT: NORMAL
SPECIMEN SOURCE: NORMAL

## 2019-12-12 ENCOUNTER — PRENATAL OFFICE VISIT (OUTPATIENT)
Dept: OBGYN | Facility: OTHER | Age: 29
End: 2019-12-12
Payer: COMMERCIAL

## 2019-12-12 ENCOUNTER — TELEPHONE (OUTPATIENT)
Dept: OBGYN | Facility: OTHER | Age: 29
End: 2019-12-12

## 2019-12-12 VITALS
BODY MASS INDEX: 21.19 KG/M2 | DIASTOLIC BLOOD PRESSURE: 64 MMHG | SYSTOLIC BLOOD PRESSURE: 110 MMHG | HEART RATE: 78 BPM | WEIGHT: 117.75 LBS

## 2019-12-12 DIAGNOSIS — Z87.59 HISTORY OF PRIOR PREGNANCY WITH SGA NEWBORN: ICD-10-CM

## 2019-12-12 DIAGNOSIS — O09.899 HISTORY OF PRETERM DELIVERY, CURRENTLY PREGNANT: Primary | ICD-10-CM

## 2019-12-12 PROCEDURE — 99207 ZZC COMPLICATED OB VISIT: CPT | Performed by: OBSTETRICS & GYNECOLOGY

## 2019-12-12 ASSESSMENT — PATIENT HEALTH QUESTIONNAIRE - PHQ9: SUM OF ALL RESPONSES TO PHQ QUESTIONS 1-9: 1

## 2019-12-12 NOTE — TELEPHONE ENCOUNTER
RN please call [facility] to make sure they received the referral form I sent last month.  Let me know if they need me to send another form.

## 2019-12-12 NOTE — TELEPHONE ENCOUNTER
"RN called and left a voicemail for MFM to look for referral and asked for call back to women's health clinic.     Per OV note on 11/21/2019 with Dr. Dolan, \"Refer to MFM for CL given history of PTD at 34 weeks.  She will also discuss 17OHP with them at that time. She was on that for past pregnancy and delivered at term, but the shots were a stressful experience for her.  Difficulty with insurance coverage.\"    Leora Gimenez RN on 12/12/2019 at 2:29 PM    "

## 2019-12-12 NOTE — PROGRESS NOTES
Presents for routine  appointment.     No complaints.  Nausea is better. She is nauseated once daily and uses the zofran once weekly.   No LOF/VB/Ctxs.    ROS:   and GI  negative.     Please see Prenatal Vitals and Notes Flowsheet for objective data.    A/P:  29 year old  at 11w5d       ICD-10-CM    1. History of  delivery, currently pregnant O09.219    2. History of prior pregnancy with SGA  Z87.59        Referral placed last visit for MFM for CL given history of PTD at 34 weeks.  She will also discuss 17OHP with them at that time. She was on that for past pregnancy and delivered at term, but the shots were a stressful experience for her.  Difficulty with insurance coverage. LDA beginning at 12 weeks until delivery for history of non-specified connective tissue disorder.  History of prior SGA  - serial growth US.   Anxiety - cymbalta  Genetic screening declined  Follow up in 4  weeks.      Diane Dolan MD

## 2019-12-12 NOTE — NURSING NOTE
"Chief Complaint   Patient presents with     Prenatal Care     11w5d       Initial /64 (BP Location: Left arm, Cuff Size: Adult Regular)   Pulse 78   Wt 53.4 kg (117 lb 12 oz)   LMP 2019 (Exact Date)   BMI 21.19 kg/m   Estimated body mass index is 21.19 kg/m  as calculated from the following:    Height as of 19: 1.588 m (5' 2.5\").    Weight as of this encounter: 53.4 kg (117 lb 12 oz).  BP completed using cuff size: regular        PHQ-9 score:    PHQ-9 SCORE 2019   PHQ-9 Total Score MyChart 0   PHQ-9 Total Score 0     Diane Tabares MA on 2019 at 1:52 PM        "

## 2019-12-13 NOTE — TELEPHONE ENCOUNTER
MFM returned message and they state they did not receive referral for patient. RN verbally told Dr. Dolan in clinic. She will fill out referral and staff can refax.     Leora Gimenez RN on 12/13/2019 at 8:38 AM

## 2019-12-16 NOTE — TELEPHONE ENCOUNTER
RN called and spoke to Freda at [facility]. She confirmed they did receive the new referral. Doctor at [facility] to review due to request for consult and then they will be contacting patient.     No further action needed at this time. Will wait for patient to be scheduled with [facility].     Leora Gimenez RN on 12/16/2019 at 10:03 AM

## 2020-01-13 ENCOUNTER — TELEPHONE (OUTPATIENT)
Dept: OBGYN | Facility: OTHER | Age: 30
End: 2020-01-13

## 2020-01-13 NOTE — TELEPHONE ENCOUNTER
Left voicemail message to see if Lizzie is able to switch her 3/9 appt to 3/12 with Dr. Dolan.    Lizzeth Ny

## 2020-01-16 ENCOUNTER — PRENATAL OFFICE VISIT (OUTPATIENT)
Dept: OBGYN | Facility: OTHER | Age: 30
End: 2020-01-16
Payer: COMMERCIAL

## 2020-01-16 VITALS
WEIGHT: 122.5 LBS | HEART RATE: 64 BPM | SYSTOLIC BLOOD PRESSURE: 112 MMHG | BODY MASS INDEX: 22.05 KG/M2 | DIASTOLIC BLOOD PRESSURE: 78 MMHG

## 2020-01-16 DIAGNOSIS — Z87.59 HISTORY OF PRIOR PREGNANCY WITH SGA NEWBORN: ICD-10-CM

## 2020-01-16 DIAGNOSIS — O09.899 HISTORY OF PRETERM DELIVERY, CURRENTLY PREGNANT: Primary | ICD-10-CM

## 2020-01-16 PROCEDURE — 99207 ZZC PRENATAL VISIT: CPT | Performed by: OBSTETRICS & GYNECOLOGY

## 2020-01-16 NOTE — PROGRESS NOTES
Presents for routine  appointment.     No complaints aside from some pelvic discomfort.  She is not too concerned about it.    No LOF/VB/Ctxs.    ROS:   and GI negative.     Please see Prenatal Vitals and Notes Flowsheet for objective data.    A/P:  30 year old  at 16w5d       ICD-10-CM    1. History of  delivery, currently pregnant O09.219    2. History of prior pregnancy with SGA  Z87.59        MFM US tomorrow. Referral placed last visit for [facility] for CL given history of PTD at 34 weeks.  She will also discuss 17OHP with them at that time. She was on that for past pregnancy and delivered at term, but the shots were a stressful experience for her.  Difficulty with insurance coverage.    LDA beginning at 12 weeks until delivery for history of non-specified connective tissue disorder.  She has not started this yet, so will start now.    Anxiety - cymbalta. stable  Follow up in 4  week(s).      Diane Dolan MD

## 2020-01-17 ENCOUNTER — TRANSFERRED RECORDS (OUTPATIENT)
Dept: HEALTH INFORMATION MANAGEMENT | Facility: CLINIC | Age: 30
End: 2020-01-17

## 2020-01-20 DIAGNOSIS — F41.1 GAD (GENERALIZED ANXIETY DISORDER): ICD-10-CM

## 2020-01-22 NOTE — TELEPHONE ENCOUNTER
"Serotonin-Norepinephrine Reuptake Inhibitors  Failed   DULoxetine (CYMBALTA) 20 MG capsule   Rerun Protocol (1/22/2020 2:54 PM)      No active pregnancy on record         Blood pressure under 140/90 in past 12 months          BP Readings from Last 3 Encounters:   01/16/20 112/78   12/12/19 110/64   12/04/19 102/60                Recent (12 mo) or future (30 days) visit within the authorizing provider's specialty      Patient has had an office visit with the authorizing provider or a provider within the authorizing providers department within the previous 12 mos or has a future within next 30 days. See \"Patient Info\" tab in inbasket, or \"Choose Columns\" in Meds & Orders section of the refill encounter.              Medication is active on med list         Patient is age 18 or older         No positive pregnancy test in past 12 months        Routing refill request to provider for review/approval because:  Pt is pregnant.    Kimber Bolanos RN            "

## 2020-01-23 PROBLEM — O09.899 HISTORY OF PRETERM DELIVERY, CURRENTLY PREGNANT: Status: ACTIVE | Noted: 2018-08-09

## 2020-01-23 RX ORDER — DULOXETIN HYDROCHLORIDE 20 MG/1
CAPSULE, DELAYED RELEASE ORAL
Qty: 90 CAPSULE | Refills: 3 | Status: SHIPPED | OUTPATIENT
Start: 2020-01-23 | End: 2021-01-14

## 2020-01-31 ENCOUNTER — TRANSFERRED RECORDS (OUTPATIENT)
Dept: HEALTH INFORMATION MANAGEMENT | Facility: CLINIC | Age: 30
End: 2020-01-31

## 2020-02-10 ENCOUNTER — PRENATAL OFFICE VISIT (OUTPATIENT)
Dept: OBGYN | Facility: OTHER | Age: 30
End: 2020-02-10
Payer: COMMERCIAL

## 2020-02-10 VITALS
SYSTOLIC BLOOD PRESSURE: 114 MMHG | BODY MASS INDEX: 22.77 KG/M2 | HEART RATE: 66 BPM | DIASTOLIC BLOOD PRESSURE: 62 MMHG | WEIGHT: 126.5 LBS

## 2020-02-10 DIAGNOSIS — O09.899 HISTORY OF PRETERM DELIVERY, CURRENTLY PREGNANT: Primary | ICD-10-CM

## 2020-02-10 DIAGNOSIS — Z87.59 HISTORY OF PRIOR PREGNANCY WITH SGA NEWBORN: ICD-10-CM

## 2020-02-10 PROCEDURE — 99207 ZZC COMPLICATED OB VISIT: CPT | Performed by: OBSTETRICS & GYNECOLOGY

## 2020-02-10 NOTE — PROGRESS NOTES
Presents for routine  appointment.     No complaints.    No LOF/VB/Ctxs.    ROS:   and GI  negative.     Please see Prenatal Vitals and Notes Flowsheet for objective data.    A/P:  30 year old  at 20w2d       ICD-10-CM    1. History of  delivery, currently pregnant O09.219 Glucose tolerance gest screen 1 hour     Hemoglobin     Treponema Abs w Reflex to RPR and Titer   2. History of prior pregnancy with SGA  Z87.59        Discussed ultrasound results - [facility] note reviewed. [facility] US : ~16 wks.  Normal CL.  Declined 17OHP. Plan TVUS every 2 wks until 22-23 wks.  Normal cervical length at 18 wks 6 days.  Plan repeat and comprehensive Friday.    GCT, hgb and anti-treponema testing  after 24 weeks   Follow up in 4  weeks.      Diane Dolan MD

## 2020-02-10 NOTE — NURSING NOTE
"Chief Complaint   Patient presents with     Prenatal Care     20w2d       Initial /62 (BP Location: Right arm, Cuff Size: Adult Regular)   Pulse 66   Wt 57.4 kg (126 lb 8 oz)   LMP 2019 (Exact Date)   BMI 22.77 kg/m   Estimated body mass index is 22.77 kg/m  as calculated from the following:    Height as of 19: 1.588 m (5' 2.5\").    Weight as of this encounter: 57.4 kg (126 lb 8 oz).  BP completed using cuff size: regular        PHQ-9 score:    PHQ-9 SCORE 2019   PHQ-9 Total Score MyChart -   PHQ-9 Total Score 1         Diane Tabares MA on 2/10/2020 at 3:57 PM    "

## 2020-02-14 ENCOUNTER — TRANSFERRED RECORDS (OUTPATIENT)
Dept: HEALTH INFORMATION MANAGEMENT | Facility: CLINIC | Age: 30
End: 2020-02-14

## 2020-02-20 ENCOUNTER — OFFICE VISIT (OUTPATIENT)
Dept: OBGYN | Facility: OTHER | Age: 30
End: 2020-02-20
Payer: COMMERCIAL

## 2020-02-20 VITALS
BODY MASS INDEX: 22.99 KG/M2 | SYSTOLIC BLOOD PRESSURE: 102 MMHG | HEART RATE: 76 BPM | DIASTOLIC BLOOD PRESSURE: 68 MMHG | WEIGHT: 127.75 LBS

## 2020-02-20 DIAGNOSIS — Z33.1 PREGNANCY, INCIDENTAL: ICD-10-CM

## 2020-02-20 DIAGNOSIS — R30.0 DYSURIA: Primary | ICD-10-CM

## 2020-02-20 LAB
ALBUMIN UR-MCNC: NEGATIVE MG/DL
APPEARANCE UR: CLEAR
BILIRUB UR QL STRIP: NEGATIVE
COLOR UR AUTO: YELLOW
GLUCOSE UR STRIP-MCNC: NEGATIVE MG/DL
HGB UR QL STRIP: NEGATIVE
KETONES UR STRIP-MCNC: NEGATIVE MG/DL
LEUKOCYTE ESTERASE UR QL STRIP: NEGATIVE
NITRATE UR QL: NEGATIVE
PH UR STRIP: 7 PH (ref 5–7)
SOURCE: NORMAL
SP GR UR STRIP: 1.02 (ref 1–1.03)
UROBILINOGEN UR STRIP-ACNC: 0.2 EU/DL (ref 0.2–1)

## 2020-02-20 PROCEDURE — 99213 OFFICE O/P EST LOW 20 MIN: CPT | Performed by: ADVANCED PRACTICE MIDWIFE

## 2020-02-20 PROCEDURE — 81003 URINALYSIS AUTO W/O SCOPE: CPT | Performed by: ADVANCED PRACTICE MIDWIFE

## 2020-02-20 NOTE — NURSING NOTE
"Chief Complaint   Patient presents with     Prenatal Care     check for uti       Initial /68 (BP Location: Left arm, Patient Position: Sitting, Cuff Size: Adult Regular)   Pulse 76   Wt 57.9 kg (127 lb 12 oz)   LMP 09/21/2019 (Exact Date)   BMI 22.99 kg/m   Estimated body mass index is 22.99 kg/m  as calculated from the following:    Height as of 11/21/19: 1.588 m (5' 2.5\").    Weight as of this encounter: 57.9 kg (127 lb 12 oz).  Medication Reconciliation: complete    Natasha Tirado CMA    "

## 2020-02-20 NOTE — PROGRESS NOTES
SUBJECTIVE:    Lizzie Oconnell is a 30 year old female who presents today with 2 day(s) of dysuria.   Denies changes in her vaginal discharge, no odor no itching. No recent antibiotics.   Denies contractions.     UTI HX: frequent.  ADDITIONAL SX: has low back pain that worsens toward evening and at night.     Patient Active Problem List   Diagnosis     Pain in joint, multiple sites     TYREL (generalized anxiety disorder)     Mild major depression (H)     Panic attack     History of prior pregnancy with SGA      History of  delivery, currently pregnant     Migraines     Dysmenorrhea     Past Medical History:   Diagnosis Date     Anxiety 2018     Depressive disorder      Pregnancy      Past Surgical History:   Procedure Laterality Date     CHOLECYSTECTOMY       GALLBLADDER SURGERY       Current Outpatient Medications   Medication Sig Dispense Refill     DULoxetine (CYMBALTA) 20 MG capsule TAKE 1 CAPSULE BY MOUTH EVERY DAY 90 capsule 3     ondansetron (ZOFRAN) 8 MG tablet Take 1 tablet (8 mg) by mouth every 8 hours as needed for nausea 30 tablet 1     Prenatal Vit-Fe Fumarate-FA (PRENATAL MULTIVITAMIN W/IRON) 27-0.8 MG tablet Take 1 tablet by mouth daily       Allergies   Allergen Reactions     Aminothiols      Amoxicillin      Compazine [Prochlorperazine]      Penicillins          ROS:   ROS: 10 point ROS neg other than the symptoms noted above in the HPI.    EXAM  /68 (BP Location: Left arm, Patient Position: Sitting, Cuff Size: Adult Regular)   Pulse 76   Wt 57.9 kg (127 lb 12 oz)   LMP 2019 (Exact Date)   BMI 22.99 kg/m    Patient appears well, afebrile.   Eyes:  sclera clear  Lungs:  breathing unlabored.   CV:  Negative for edema  ABD: Soft without supra pubic pain or tenderness, pregnant at s=d.    BACK: Neg CVAT.   M/S:  no gross defects.   Neuro: normal strength and sensation.   Psych:  appropriate, speech normal  Urine dip shows   Color Urine (no units)   Date Value    02/20/2020 Yellow     Appearance Urine (no units)   Date Value   02/20/2020 Clear     Glucose Urine (mg/dL)   Date Value   02/20/2020 Negative     Bilirubin Urine (no units)   Date Value   02/20/2020 Negative     Ketones Urine (mg/dL)   Date Value   02/20/2020 Negative     Specific Gravity Urine (no units)   Date Value   02/20/2020 1.025     pH Urine (pH)   Date Value   02/20/2020 7.0     Protein Albumin Urine (mg/dL)   Date Value   02/20/2020 Negative     Urobilinogen Urine (EU/dL)   Date Value   02/20/2020 0.2     Nitrite Urine (no units)   Date Value   02/20/2020 Negative     Leukocyte Esterase Urine (no units)   Date Value   02/20/2020 Negative     microscopic exam: not indicated.      ASSESSMENT: no evidence of a UTI    PLAN:   (R30.0) Dysuria  (primary encounter diagnosis)  Comment:   Plan: *UA reflex to Microscopic and Culture ([place]         and [facility] (except [place] and         [place])     Declines exam to r/o bv/yeast       (Z33.1) Pregnancy, incidental  Comment:   Plan:                     Urine was not sent for culture.   Push fluids    RTC with continued symptoms or concerns.  Reviewed methods to decrease incidence of UTI.  Has follow up scheduled with [facility] and here as well for her GCT.

## 2020-02-24 ENCOUNTER — TRANSFERRED RECORDS (OUTPATIENT)
Dept: HEALTH INFORMATION MANAGEMENT | Facility: CLINIC | Age: 30
End: 2020-02-24

## 2020-02-27 ENCOUNTER — OFFICE VISIT (OUTPATIENT)
Dept: FAMILY MEDICINE | Facility: OTHER | Age: 30
End: 2020-02-27
Payer: COMMERCIAL

## 2020-02-27 VITALS
HEIGHT: 62 IN | TEMPERATURE: 99.9 F | OXYGEN SATURATION: 98 % | DIASTOLIC BLOOD PRESSURE: 52 MMHG | WEIGHT: 129 LBS | SYSTOLIC BLOOD PRESSURE: 100 MMHG | BODY MASS INDEX: 23.74 KG/M2 | HEART RATE: 116 BPM | RESPIRATION RATE: 24 BRPM

## 2020-02-27 DIAGNOSIS — J01.00 ACUTE NON-RECURRENT MAXILLARY SINUSITIS: Primary | ICD-10-CM

## 2020-02-27 DIAGNOSIS — Z33.1 PREGNANCY, INCIDENTAL: ICD-10-CM

## 2020-02-27 DIAGNOSIS — R05.9 COUGH: ICD-10-CM

## 2020-02-27 LAB
FLUAV+FLUBV AG SPEC QL: NEGATIVE
FLUAV+FLUBV AG SPEC QL: POSITIVE
SPECIMEN SOURCE: ABNORMAL

## 2020-02-27 PROCEDURE — 99214 OFFICE O/P EST MOD 30 MIN: CPT | Performed by: FAMILY MEDICINE

## 2020-02-27 PROCEDURE — 87804 INFLUENZA ASSAY W/OPTIC: CPT | Performed by: FAMILY MEDICINE

## 2020-02-27 RX ORDER — AZITHROMYCIN 250 MG/1
TABLET, FILM COATED ORAL
Qty: 6 TABLET | Refills: 0 | Status: SHIPPED | OUTPATIENT
Start: 2020-02-27 | End: 2020-03-12

## 2020-02-27 ASSESSMENT — MIFFLIN-ST. JEOR: SCORE: 1268.39

## 2020-02-27 NOTE — RESULT ENCOUNTER NOTE
Lizzie, your results show that you had influenza. FYI in case anyone exposed to you gets symptomatic.  Please let me know if you have any questions.  Tia Woodson MD

## 2020-02-27 NOTE — PROGRESS NOTES
Subjective     Lizzie Oconnell is a 30 year old female who presents to clinic today for the following health issues:    History of Present Illness        She eats 2-3 servings of fruits and vegetables daily.She consumes 1 sweetened beverage(s) daily.She exercises with enough effort to increase her heart rate 9 or less minutes per day.  She exercises with enough effort to increase her heart rate 3 or less days per week.   She is taking medications regularly.     Acute Illness   Acute illness concerns: cough   Onset: 3 weeks     Fever: YES    Chills/Sweats: YES    Headache (location?): YES    Sinus Pressure:YES    Conjunctivitis:  no    Ear Pain: YES: both    Rhinorrhea: YES    Congestion: YES    Sore Throat: YES     Cough: YES-productive of yellow sputum    Wheeze: no    Decreased Appetite: YES    Nausea: YES    Vomiting: no    Diarrhea:  no    Dysuria/Freq.: no    Fatigue/Achiness: YES    Sick/Strep Exposure: YES     Therapies Tried and outcome: Tylenol helps a little     -------------------------------------    Patient Active Problem List   Diagnosis     Pain in joint, multiple sites     TYREL (generalized anxiety disorder)     Mild major depression (H)     Panic attack     History of prior pregnancy with SGA      History of  delivery, currently pregnant     Migraines     Dysmenorrhea     Past Surgical History:   Procedure Laterality Date     CHOLECYSTECTOMY       GALLBLADDER SURGERY         Social History     Tobacco Use     Smoking status: Never Smoker     Smokeless tobacco: Never Used   Substance Use Topics     Alcohol use: No     Family History   Problem Relation Age of Onset     Anxiety Disorder Mother      Depression Mother      Anxiety Disorder Brother      Depression Brother            Reviewed and updated as needed this visit by Provider  Tobacco  Allergies  Meds  Problems  Med Hx  Surg Hx  Fam Hx         Review of Systems   ROS COMP: Constitutional, HEENT, cardiovascular, pulmonary,  "GI, , musculoskeletal, neuro, skin, endocrine and psych systems are negative, except as otherwise noted.      Objective    /52 (BP Location: Right arm, Patient Position: Chair, Cuff Size: Adult Regular)   Pulse 116   Temp 99.9  F (37.7  C) (Temporal)   Resp 24   Ht 1.575 m (5' 2\")   Wt 58.5 kg (129 lb)   LMP 09/21/2019 (Exact Date)   SpO2 98%   Breastfeeding No   BMI 23.59 kg/m    Body mass index is 23.59 kg/m .  Physical Exam   GENERAL: healthy, alert and no distress  EYES: Eyes grossly normal to inspection, PERRL and conjunctivae and sclerae normal  HENT: ear canals and TM's normal, nose with congesetion and mouth without ulcers or lesions  Bilateral maxillary facial pain noted  NECK: mild anterior adenopathy, no asymmetry, masses, or scars and thyroid normal to palpation  RESP: lungs clear to auscultation - no rales, rhonchi or wheezes  CV: regular rate and rhythm, normal S1 S2, no S3 or S4, no murmur, click or rub, no peripheral edema and peripheral pulses strong  ABDOMEN: soft, nontender, no hepatosplenomegaly, no masses and bowel sounds normal  MS: no gross musculoskeletal defects noted, no edema            Assessment & Plan       ICD-10-CM    1. Acute non-recurrent maxillary sinusitis J01.00 azithromycin (ZITHROMAX) 250 MG tablet   2. Cough R05 Influenza A/B antigen     CANCELED: Influenza A and B and RSV PCR   3. Pregnancy, incidental Z33.1           Discussed that her children have already had flu, so no prophylaxis needed and that she has had symptoms too long for antivirals to be helpful. She has developed a secondary sinus infection and antibiotics are needed. She has difficulty breathing with amoxicillin, and is pregnant. So we chose zpak for her today despite potential resistance to this.    Return in about 2 weeks (around 3/12/2020) for if not improved.    Tia Woodson MD, MD  [facility]"

## 2020-03-12 ENCOUNTER — PRENATAL OFFICE VISIT (OUTPATIENT)
Dept: OBGYN | Facility: OTHER | Age: 30
End: 2020-03-12
Payer: COMMERCIAL

## 2020-03-12 VITALS
HEART RATE: 68 BPM | BODY MASS INDEX: 23.91 KG/M2 | DIASTOLIC BLOOD PRESSURE: 62 MMHG | SYSTOLIC BLOOD PRESSURE: 100 MMHG | WEIGHT: 130.75 LBS

## 2020-03-12 DIAGNOSIS — O09.899 HISTORY OF PRETERM DELIVERY, CURRENTLY PREGNANT: ICD-10-CM

## 2020-03-12 DIAGNOSIS — Z87.59 HISTORY OF PRIOR PREGNANCY WITH SGA NEWBORN: Primary | ICD-10-CM

## 2020-03-12 LAB
GLUCOSE 1H P 50 G GLC PO SERPL-MCNC: 105 MG/DL (ref 60–129)
HGB BLD-MCNC: 11.8 G/DL (ref 11.7–15.7)

## 2020-03-12 PROCEDURE — 86780 TREPONEMA PALLIDUM: CPT | Performed by: OBSTETRICS & GYNECOLOGY

## 2020-03-12 PROCEDURE — 36415 COLL VENOUS BLD VENIPUNCTURE: CPT | Performed by: OBSTETRICS & GYNECOLOGY

## 2020-03-12 PROCEDURE — 85018 HEMOGLOBIN: CPT | Performed by: OBSTETRICS & GYNECOLOGY

## 2020-03-12 PROCEDURE — 99207 ZZC COMPLICATED OB VISIT: CPT | Performed by: OBSTETRICS & GYNECOLOGY

## 2020-03-12 PROCEDURE — 82950 GLUCOSE TEST: CPT | Performed by: OBSTETRICS & GYNECOLOGY

## 2020-03-12 NOTE — NURSING NOTE
"Chief Complaint   Patient presents with     Prenatal Care     24w5d       Initial /62 (BP Location: Left arm, Cuff Size: Adult Regular)   Pulse 68   Wt 59.3 kg (130 lb 12 oz)   LMP 2019 (Exact Date)   BMI 23.91 kg/m   Estimated body mass index is 23.91 kg/m  as calculated from the following:    Height as of 20: 1.575 m (5' 2\").    Weight as of this encounter: 59.3 kg (130 lb 12 oz).  BP completed using cuff size: regular        PHQ-9 score:    PHQ-9 SCORE 2019   PHQ-9 Total Score MyChart -   PHQ-9 Total Score 1       Diane Tabares MA on 3/12/2020 at 4:20 PM      "

## 2020-03-12 NOTE — PROGRESS NOTES
Presents for routine  appointment.     No complaints.  Influenza A positive 2020.  Symptoms have resolved.   No abnormal discharge , no leaking fluid , no contractions , no vaginal bleeding    ROS:   and GI  negative.     Please see Prenatal Vitals and Notes Flowsheet for objective data.    A/P:  30 year old  at 24w5d       ICD-10-CM    1. History of prior pregnancy with SGA   Z87.59    2. History of  delivery, currently pregnant  O09.219 Treponema Abs w Reflex to RPR and Titer     Hemoglobin     Glucose tolerance gest screen 1 hour       1 hr glucola today.  She does have access to MyChart.  She is Rh Positive   TDAP  next visit.    Discussed signs and symptoms of  labor  [facility] US reviewed.  No further testing needed  Continue Cymbalta  Consider growth US next visit due to history of SGA.  Follow up in 4  weeks.      Diane Dolan MD      [facility] US 3/2/2020

## 2020-03-13 LAB — T PALLIDUM AB SER QL: NONREACTIVE

## 2020-04-06 ENCOUNTER — PRENATAL OFFICE VISIT (OUTPATIENT)
Dept: OBGYN | Facility: CLINIC | Age: 30
End: 2020-04-06
Payer: COMMERCIAL

## 2020-04-06 VITALS
BODY MASS INDEX: 24.58 KG/M2 | WEIGHT: 134.4 LBS | HEART RATE: 109 BPM | SYSTOLIC BLOOD PRESSURE: 110 MMHG | DIASTOLIC BLOOD PRESSURE: 72 MMHG

## 2020-04-06 DIAGNOSIS — Z87.59 HISTORY OF PRIOR PREGNANCY WITH SGA NEWBORN: Primary | ICD-10-CM

## 2020-04-06 DIAGNOSIS — O09.899 HISTORY OF PRETERM DELIVERY, CURRENTLY PREGNANT: ICD-10-CM

## 2020-04-06 PROCEDURE — 99207 ZZC COMPLICATED OB VISIT: CPT | Performed by: OBSTETRICS & GYNECOLOGY

## 2020-04-06 NOTE — NURSING NOTE
"Chief Complaint   Patient presents with     Prenatal Care     28w2d       Initial /72 (BP Location: Right arm, Cuff Size: Adult Regular)   Pulse 109   Wt 61 kg (134 lb 6.4 oz)   LMP 2019 (Exact Date)   BMI 24.58 kg/m   Estimated body mass index is 24.58 kg/m  as calculated from the following:    Height as of 20: 1.575 m (5' 2\").    Weight as of this encounter: 61 kg (134 lb 6.4 oz).  BP completed using cuff size: regular        PHQ-9 score:    PHQ-9 SCORE 2019   PHQ-9 Total Score MyChart -   PHQ-9 Total Score 1       Diane Tabares MA on 2020 at 4:06 PM    "

## 2020-05-04 ENCOUNTER — PRENATAL OFFICE VISIT (OUTPATIENT)
Dept: OBGYN | Facility: CLINIC | Age: 30
End: 2020-05-04
Payer: COMMERCIAL

## 2020-05-04 ENCOUNTER — ANCILLARY PROCEDURE (OUTPATIENT)
Dept: ULTRASOUND IMAGING | Facility: CLINIC | Age: 30
End: 2020-05-04
Attending: OBSTETRICS & GYNECOLOGY
Payer: COMMERCIAL

## 2020-05-04 ENCOUNTER — TELEPHONE (OUTPATIENT)
Dept: OBGYN | Facility: OTHER | Age: 30
End: 2020-05-04

## 2020-05-04 VITALS
WEIGHT: 137.5 LBS | DIASTOLIC BLOOD PRESSURE: 72 MMHG | BODY MASS INDEX: 25.15 KG/M2 | HEART RATE: 93 BPM | SYSTOLIC BLOOD PRESSURE: 107 MMHG

## 2020-05-04 DIAGNOSIS — O36.5990 PREGNANCY AFFECTED BY FETAL GROWTH RESTRICTION: Primary | ICD-10-CM

## 2020-05-04 DIAGNOSIS — Z28.21 TETANUS, DIPHTHERIA, AND ACELLULAR PERTUSSIS (TDAP) VACCINATION DECLINED: ICD-10-CM

## 2020-05-04 DIAGNOSIS — O09.899 HISTORY OF PRETERM DELIVERY, CURRENTLY PREGNANT: ICD-10-CM

## 2020-05-04 DIAGNOSIS — Z87.59 HISTORY OF PRIOR PREGNANCY WITH SGA NEWBORN: ICD-10-CM

## 2020-05-04 LAB — RADIOLOGIST FLAGS: ABNORMAL

## 2020-05-04 PROCEDURE — 76816 OB US FOLLOW-UP PER FETUS: CPT

## 2020-05-04 PROCEDURE — 99207 ZZC COMPLICATED OB VISIT: CPT | Performed by: OBSTETRICS & GYNECOLOGY

## 2020-05-04 NOTE — NURSING NOTE
"Chief Complaint   Patient presents with     Prenatal Care     32w2d       Initial /72 (BP Location: Right arm, Cuff Size: Adult Regular)   Pulse 93   Wt 62.4 kg (137 lb 8 oz)   LMP 2019 (Exact Date)   BMI 25.15 kg/m   Estimated body mass index is 25.15 kg/m  as calculated from the following:    Height as of 20: 1.575 m (5' 2\").    Weight as of this encounter: 62.4 kg (137 lb 8 oz).  BP completed using cuff size: regular        PHQ-9 score:    PHQ-9 SCORE 2019   PHQ-9 Total Score MyChart -   PHQ-9 Total Score 1         Diane Tabares MA on 2020 at 4:18 PM    "

## 2020-05-04 NOTE — PROGRESS NOTES
Presents for routine  appointment.     No complaints.    No abnormal discharge , no leaking fluid , no contractions , no vaginal bleeding    ROS:   and GI  negative.     Please see Prenatal Vitals and Notes Flowsheet for objective data.    A/P:  30 year old  at 32w2d       ICD-10-CM    1. Pregnancy affected by fetal growth restriction  O36.5990    2. History of prior pregnancy with SGA   Z87.59    3. History of  delivery, currently pregnant  O09.219    4. Tetanus, diphtheria, and acellular pertussis (Tdap) vaccination declined  Z28.21        AC <2%, refer to [facility]  Tdap declined  Continue Cymbalta  Follow up in 2 weeks.      Diane Dolan MD

## 2020-05-04 NOTE — TELEPHONE ENCOUNTER
RN took call from Nisa in imaging reporting urgent finding on pt's ultrasound from today (5/4/2020): flagged for concern for developing IUG.    RN routing to Dr. Dolan.    Marva Ragland RN on 5/4/2020 at 4:12 PM

## 2020-05-06 ENCOUNTER — TRANSFERRED RECORDS (OUTPATIENT)
Dept: HEALTH INFORMATION MANAGEMENT | Facility: CLINIC | Age: 30
End: 2020-05-06

## 2020-05-13 ENCOUNTER — TRANSFERRED RECORDS (OUTPATIENT)
Dept: HEALTH INFORMATION MANAGEMENT | Facility: CLINIC | Age: 30
End: 2020-05-13

## 2020-05-18 ENCOUNTER — PRENATAL OFFICE VISIT (OUTPATIENT)
Dept: OBGYN | Facility: CLINIC | Age: 30
End: 2020-05-18
Payer: COMMERCIAL

## 2020-05-18 VITALS
DIASTOLIC BLOOD PRESSURE: 72 MMHG | WEIGHT: 140.6 LBS | BODY MASS INDEX: 25.72 KG/M2 | SYSTOLIC BLOOD PRESSURE: 108 MMHG | HEART RATE: 92 BPM

## 2020-05-18 DIAGNOSIS — O36.5990 PREGNANCY AFFECTED BY FETAL GROWTH RESTRICTION: Primary | ICD-10-CM

## 2020-05-18 DIAGNOSIS — Z87.59 HISTORY OF PRIOR PREGNANCY WITH SGA NEWBORN: ICD-10-CM

## 2020-05-18 DIAGNOSIS — O09.899 HISTORY OF PRETERM DELIVERY, CURRENTLY PREGNANT: ICD-10-CM

## 2020-05-18 PROCEDURE — 99207 ZZC COMPLICATED OB VISIT: CPT | Performed by: OBSTETRICS & GYNECOLOGY

## 2020-05-18 NOTE — PROGRESS NOTES
Presents for routine  appointment.     No complaints.    No abnormal discharge , no leaking fluid , no contractions , no vaginal bleeding    ROS:   and GI  negative.     Please see Prenatal Vitals and Notes Flowsheet for objective data.    A/P:  30 year old  at 34w2d       ICD-10-CM    1. Pregnancy affected by fetal growth restriction  O36.5990    2. History of prior pregnancy with SGA   Z87.59    3. History of  delivery, currently pregnant  O09.219        FGR - continue  surveillance with MFM.  This is scheduled . Delivery is recommended by 39w 6d, sooner as needed.    Reportable signs and symptoms discussed  Group B Strep next visit  Follow up in 1-2 weeks.      Diane Dolan MD

## 2020-05-18 NOTE — NURSING NOTE
"Chief Complaint   Patient presents with     Prenatal Care     34w2d       Initial /72 (BP Location: Right arm, Cuff Size: Adult Regular)   Pulse 92   Wt 63.8 kg (140 lb 9.6 oz)   LMP 2019 (Exact Date)   BMI 25.72 kg/m   Estimated body mass index is 25.72 kg/m  as calculated from the following:    Height as of 20: 1.575 m (5' 2\").    Weight as of this encounter: 63.8 kg (140 lb 9.6 oz).  BP completed using cuff size: regular        PHQ-9 score:    PHQ-9 SCORE 2019   PHQ-9 Total Score MyChart -   PHQ-9 Total Score 1       Diane Tabares MA on 2020 at 4:03 PM      "

## 2020-05-20 ENCOUNTER — TRANSFERRED RECORDS (OUTPATIENT)
Dept: HEALTH INFORMATION MANAGEMENT | Facility: CLINIC | Age: 30
End: 2020-05-20

## 2020-05-27 ENCOUNTER — TRANSFERRED RECORDS (OUTPATIENT)
Dept: HEALTH INFORMATION MANAGEMENT | Facility: CLINIC | Age: 30
End: 2020-05-27

## 2020-05-28 ENCOUNTER — PRENATAL OFFICE VISIT (OUTPATIENT)
Dept: OBGYN | Facility: CLINIC | Age: 30
End: 2020-05-28
Payer: COMMERCIAL

## 2020-05-28 ENCOUNTER — TELEPHONE (OUTPATIENT)
Dept: OBGYN | Facility: CLINIC | Age: 30
End: 2020-05-28

## 2020-05-28 VITALS
SYSTOLIC BLOOD PRESSURE: 118 MMHG | OXYGEN SATURATION: 98 % | DIASTOLIC BLOOD PRESSURE: 78 MMHG | BODY MASS INDEX: 26.17 KG/M2 | HEART RATE: 93 BPM | WEIGHT: 143.1 LBS

## 2020-05-28 DIAGNOSIS — Z87.59 HISTORY OF PRIOR PREGNANCY WITH SGA NEWBORN: ICD-10-CM

## 2020-05-28 DIAGNOSIS — Z36.85 SCREENING, ANTENATAL, FOR STREPTOCOCCUS B: Primary | ICD-10-CM

## 2020-05-28 DIAGNOSIS — O09.899 HISTORY OF PRETERM DELIVERY, CURRENTLY PREGNANT: ICD-10-CM

## 2020-05-28 PROCEDURE — 87653 STREP B DNA AMP PROBE: CPT | Performed by: OBSTETRICS & GYNECOLOGY

## 2020-05-28 PROCEDURE — 99207 ZZC PRENATAL VISIT: CPT | Performed by: OBSTETRICS & GYNECOLOGY

## 2020-05-28 PROCEDURE — 87186 SC STD MICRODIL/AGAR DIL: CPT | Performed by: OBSTETRICS & GYNECOLOGY

## 2020-05-28 NOTE — TELEPHONE ENCOUNTER
Unable to reach patient via phone. RN left a message and instructed patient to call the clinic at [phone] and ask for [facility].     has availability on 6/9/2020 in the afternoon at the [facility].    Marva Ragland RN on 5/28/2020 at 4:33 PM

## 2020-05-28 NOTE — TELEPHONE ENCOUNTER
[facility] Call Center    Phone Message    May a detailed message be left on voicemail: yes     Reason for Call: The patient is due to come in for her 36 week prenatal. Offered 2pm next Friday and the patient declined and needs a later visit.  Advised the Care Team would reach out.     Action Taken: Message routed to:  [facility] p [phone]    Travel Screening: Not Applicable

## 2020-05-28 NOTE — PROGRESS NOTES
She reports feeling reassuring daily fetal activity and will continue to record.  She gained 3 lbs since her last visit and denies any fluid leakage or regular uterine contractions.  Her GBS cultures were obtained and submitted today.  She has an allergy to Amoxicillin.  Her cervix remains unchanged and unfavorable.  She is measuring small for dates today but has a follow up appt with MFM scheduled already so is to keep this.  She just had a reassuring BPP yesterday with MFM which scored 8/8.

## 2020-05-29 LAB
GP B STREP DNA SPEC QL NAA+PROBE: POSITIVE
SPECIMEN SOURCE: ABNORMAL

## 2020-05-29 NOTE — TELEPHONE ENCOUNTER
RN notes pt scheduled for 6/5/2020 with Dr. Barreto for established prenatal.    No further action needed.     Marva Ragland RN on 5/29/2020 at 9:26 AM

## 2020-05-30 ENCOUNTER — MYC MEDICAL ADVICE (OUTPATIENT)
Dept: OBGYN | Facility: CLINIC | Age: 30
End: 2020-05-30

## 2020-06-01 LAB
BACTERIA SPEC CULT: ABNORMAL
SPECIMEN SOURCE: ABNORMAL

## 2020-06-03 ENCOUNTER — TRANSFERRED RECORDS (OUTPATIENT)
Dept: HEALTH INFORMATION MANAGEMENT | Facility: CLINIC | Age: 30
End: 2020-06-03

## 2020-06-05 ENCOUNTER — PRENATAL OFFICE VISIT (OUTPATIENT)
Dept: OBGYN | Facility: CLINIC | Age: 30
End: 2020-06-05
Payer: COMMERCIAL

## 2020-06-05 VITALS
WEIGHT: 142.1 LBS | BODY MASS INDEX: 25.99 KG/M2 | HEART RATE: 108 BPM | SYSTOLIC BLOOD PRESSURE: 124 MMHG | DIASTOLIC BLOOD PRESSURE: 84 MMHG

## 2020-06-05 DIAGNOSIS — O99.820 GROUP B STREPTOCOCCUS CARRIER, +RV CULTURE, CURRENTLY PREGNANT: ICD-10-CM

## 2020-06-05 PROCEDURE — 99207 ZZC PRENATAL VISIT: CPT | Performed by: OBSTETRICS & GYNECOLOGY

## 2020-06-05 NOTE — PROGRESS NOTES
She reports feeling reassuring daily fetal activity and will continue to record.  She lost 1 lb but denies dieting.  She also denies any fluid leakage or regular uterine contractions.  [facility] has advised induction at 38 weeks gestation but the patient prefers to deliver at term on her EDC.  Will reassess her cervix when closer to 38 weeks before determining a cervical ripening date.  Her GBS status is + and we discussed the use of Vancomycin since she is allergic to PCN and the strain is resistant to Clindamycin.  She does not want more than one dose of antibiotic since in the past, Vancomycin has made her feel warm.  She has a f/u visit with [facility] for ongoing evaluation of fetal growth restriction including weekly BPPs with UA dopplers.  Her cervix remains unchanged and unfavorable.  Her recheck BP is 124/84.

## 2020-06-05 NOTE — PATIENT INSTRUCTIONS
If you have any questions regarding your visit, Please contact your care team.    [facility]: [phone]      [facility] CLINIC HOURS TELEPHONE NUMBER   Devika Barreto .    RAMIRO Contreras -  Lizzeth -       DAE Quiroga, RN  Marva, RN     Monday, Wednesday, Thursday and Friday, [place]  8:30a.m-5:00 p.m   [facility]  [address] [place] [address]  [phone] ask for [facility]    Imaging [phone]       Urgent Care locations:    [facility] Saturday and Sunday   9 am - 5 pm    Monday-Friday   12 pm - 8 pm  Saturday and Sunday   9 am - 5 pm   [phone]     [facility] Labor and Delivery:  [phone]    If you need a medication refill, please contact your pharmacy. Please allow 3 business days for your refill to be completed.  As always, Thank you for trusting us with your healthcare needs!

## 2020-06-05 NOTE — PROGRESS NOTES
Her repeat BP was normal and the patient denies any s/s of preeclampsia.  F/u instructions were reviewed.

## 2020-06-10 ENCOUNTER — TRANSFERRED RECORDS (OUTPATIENT)
Dept: HEALTH INFORMATION MANAGEMENT | Facility: CLINIC | Age: 30
End: 2020-06-10

## 2020-06-12 ENCOUNTER — PRENATAL OFFICE VISIT (OUTPATIENT)
Dept: OBGYN | Facility: CLINIC | Age: 30
End: 2020-06-12
Payer: COMMERCIAL

## 2020-06-12 ENCOUNTER — NURSE TRIAGE (OUTPATIENT)
Dept: NURSING | Facility: CLINIC | Age: 30
End: 2020-06-12

## 2020-06-12 VITALS
BODY MASS INDEX: 26.08 KG/M2 | SYSTOLIC BLOOD PRESSURE: 115 MMHG | HEART RATE: 89 BPM | DIASTOLIC BLOOD PRESSURE: 79 MMHG | WEIGHT: 142.6 LBS

## 2020-06-12 DIAGNOSIS — Z34.83 ENCOUNTER FOR SUPERVISION OF OTHER NORMAL PREGNANCY, THIRD TRIMESTER: Primary | ICD-10-CM

## 2020-06-12 PROCEDURE — 99207 ZZC PRENATAL VISIT: CPT | Performed by: OBSTETRICS & GYNECOLOGY

## 2020-06-12 NOTE — PROGRESS NOTES
She reports feeling reassuring daily fetal activity and will continue to record.  She gained 8 oz since her last visit and denies any fluid leakage or regular uterine contractions.  Her GBS result was + so she understands that she will need IV antibiotic treatment prior to delivery.  Her cervix was rechecked today and has made a small amount of change - 1 cm/50%/-3/intact/vertex.

## 2020-06-12 NOTE — PATIENT INSTRUCTIONS
If you have any questions regarding your visit, Please contact your care team.    [facility]: [phone]      [facility] CLINIC HOURS TELEPHONE NUMBER   Devika Barreto .    RAMIRO Contreras -  Lizzeth -       DAE Quiroga, RN  Marva, RN     Monday, Wednesday, Thursday and Friday, [place]  8:30a.m-5:00 p.m   [facility]  [address] [place] [address]  [phone] ask for [facility]    Imaging [phone]       Urgent Care locations:    [facility] Saturday and Sunday   9 am - 5 pm    Monday-Friday   12 pm - 8 pm  Saturday and Sunday   9 am - 5 pm   [phone]     [facility] Labor and Delivery:  [phone]    If you need a medication refill, please contact your pharmacy. Please allow 3 business days for your refill to be completed.  As always, Thank you for trusting us with your healthcare needs!

## 2020-06-13 ENCOUNTER — TRANSFERRED RECORDS (OUTPATIENT)
Dept: HEALTH INFORMATION MANAGEMENT | Facility: CLINIC | Age: 30
End: 2020-06-13

## 2020-06-13 NOTE — TELEPHONE ENCOUNTER
"37w 6d pregnant - has been contracted for past 48 hours. Today has had nearly constant contractions for past 90 mins. Saw OB today who recommended coming in sooner than later today. Pink mucousy discharge - contraction starts as soon as earlier one ends.    Per protocol - advised L&D evaluation - called [facility] to give heads up.    Lexis Dugan RN on 2020 at 11:34 PM    Reason for Disposition    [1] History of prior delivery (multipara) AND [2] contractions < 10 minutes apart AND [3] present 1 hour    Additional Information    Negative: Passed out (i.e., lost consciousness, collapsed and was not responding)    Negative: Shock suspected (e.g., cold/pale/clammy skin, too weak to stand, low BP, rapid pulse)    Negative: Difficult to awaken or acting confused (e.g., disoriented, slurred speech)    Negative: [1] SEVERE abdominal pain (e.g., excruciating) AND [2] constant AND [3] present > 1 hour    Negative: Severe bleeding (e.g., continuous red blood from vagina, or large blood clots)    Negative: Umbilical cord hanging out of the vagina (shiny, white, curled appearance, \"like telephone cord\")    Negative: Uncontrollable urge to push (i.e., feels like baby is coming out now)    Negative: Can see baby    Negative: Sounds like a life-threatening emergency to the triager    Negative: Pregnant < 37 weeks (i.e., )    Negative: [1] Uncertain delivery date AND [2] possibly pregnant < 37 weeks (i.e., )    Negative: [1] First baby (primipara) AND [2] contractions < 6 minutes apart  AND [3] present 2 hours    Protocols used: PREGNANCY - LABOR-A-AH      "

## 2020-07-20 ENCOUNTER — MEDICAL CORRESPONDENCE (OUTPATIENT)
Dept: HEALTH INFORMATION MANAGEMENT | Facility: CLINIC | Age: 30
End: 2020-07-20

## 2020-08-25 ENCOUNTER — MEDICAL CORRESPONDENCE (OUTPATIENT)
Dept: HEALTH INFORMATION MANAGEMENT | Facility: CLINIC | Age: 30
End: 2020-08-25

## 2020-10-19 ENCOUNTER — OFFICE VISIT (OUTPATIENT)
Dept: FAMILY MEDICINE | Facility: OTHER | Age: 30
End: 2020-10-19
Payer: COMMERCIAL

## 2020-10-19 VITALS — TEMPERATURE: 98.6 F | HEART RATE: 92 BPM | OXYGEN SATURATION: 100 %

## 2020-10-19 DIAGNOSIS — J02.9 SORE THROAT: Primary | ICD-10-CM

## 2020-10-19 LAB
DEPRECATED S PYO AG THROAT QL EIA: NEGATIVE
SPECIMEN SOURCE: NORMAL
SPECIMEN SOURCE: NORMAL
STREP GROUP A PCR: NOT DETECTED

## 2020-10-19 PROCEDURE — 99213 OFFICE O/P EST LOW 20 MIN: CPT | Performed by: PHYSICIAN ASSISTANT

## 2020-10-19 PROCEDURE — 87651 STREP A DNA AMP PROBE: CPT | Performed by: PHYSICIAN ASSISTANT

## 2020-10-19 PROCEDURE — U0003 INFECTIOUS AGENT DETECTION BY NUCLEIC ACID (DNA OR RNA); SEVERE ACUTE RESPIRATORY SYNDROME CORONAVIRUS 2 (SARS-COV-2) (CORONAVIRUS DISEASE [COVID-19]), AMPLIFIED PROBE TECHNIQUE, MAKING USE OF HIGH THROUGHPUT TECHNOLOGIES AS DESCRIBED BY CMS-2020-01-R: HCPCS | Performed by: PHYSICIAN ASSISTANT

## 2020-10-19 PROCEDURE — 99N1174 PR STATISTIC STREP A RAPID: Performed by: PHYSICIAN ASSISTANT

## 2020-10-19 NOTE — PROGRESS NOTES
Subjective     Lizzie Oconnell is a 30 year old female who presents to clinic today for the following health issues:    HPI         Acute Illness  Acute illness concerns: headace, sore throat  Onset/Duration: thurs/fri  Symptoms:  Fever: no  Chills/Sweats: no  Headache (location?): YES  Sinus Pressure: YES  Conjunctivitis:  no  Ear Pain: YES- both, starts in neck and goes up into the ears, no drainage.   Rhinorrhea: YES  Congestion: YES  Sore Throat: YES  Cough: no  Wheeze: no  Decreased Appetite: no  Nausea: no  Vomiting: no  Diarrhea: no  Rashes: no  Loss of taste/smell: no  Dysuria/Freq.: no  Dysuria or Hematuria: no  Fatigue/Achiness: YES  Sick/Strep Exposure: no  Therapies tried and outcome: tylenol/ibuprofen     Children and  all have symptoms   Sinuses are most bothersome for her.     Review of Systems   Constitutional, HEENT, cardiovascular, pulmonary, gi and gu systems are negative, except as otherwise noted.      Objective    Pulse 92   Temp 98.6  F (37  C)   SpO2 100%   There is no height or weight on file to calculate BMI.  Physical Exam   GENERAL: healthy, alert and no distress  EYES: Eyes grossly normal to inspection, PERRL and conjunctivae and sclerae normal  HENT: normal cephalic/atraumatic, ear canals and TM's normal, nasal mucosa edematous , rhinorrhea clear, posterior oropharynx erythematous, no exudates, tonsils are difficult to see, soft and hard palate normal and symmetric.   NECK: no adenopathy, no asymmetry, masses, or scars and thyroid normal to palpation  RESP: lungs clear to auscultation - no rales, rhonchi or wheezes  CV: regular rate and rhythm, normal S1 S2, no S3 or S4, no murmur, click or rub, no peripheral edema and peripheral pulses strong  MS: no gross musculoskeletal defects noted, no edema    Results for orders placed or performed in visit on 10/19/20 (from the past 24 hour(s))   Streptococcus A Rapid Scr w Reflx to PCR    Specimen: Throat   Result Value Ref Range     Strep Specimen Description Throat     Streptococcus Group A Rapid Screen Negative NEG^Negative           Assessment & Plan     Diagnoses and all orders for this visit:    Sore throat  -     Streptococcus A Rapid Scr w Reflx to PCR  -     Symptomatic COVID-19 Virus (Coronavirus) by PCR  -     Group A Streptococcus PCR Throat Swab            Her rapid strep is negative, PCR is pending.    Did obtain COVID testing as well. Her   Has also been tested and both are pending their results.   She will treat symptoms with OTC therapies (she is not breastfeeding) - tylenol/ibuprofen, nasal saline rinses, decongestants, nasal steroids.   If not improving with sinus symptoms over the next 5-7 days she will let us know and we'll start antibiotics at that time.     Return in about 5 days (around 10/24/2020) for If not improving, sooner if worse or new concerns.    Options for treatment and follow-up care were reviewed with the patient and/or guardian. Patient and/or guardian engaged in the decision making process and verbalized understanding of the options discussed and agreed with the final plan.    Surjit Ariza PA-C  [facility]

## 2020-10-20 LAB
SARS-COV-2 RNA SPEC QL NAA+PROBE: NOT DETECTED
SPECIMEN SOURCE: NORMAL

## 2020-10-28 ENCOUNTER — MYC MEDICAL ADVICE (OUTPATIENT)
Dept: FAMILY MEDICINE | Facility: OTHER | Age: 30
End: 2020-10-28

## 2020-10-28 DIAGNOSIS — J01.90 ACUTE NON-RECURRENT SINUSITIS, UNSPECIFIED LOCATION: Primary | ICD-10-CM

## 2020-10-28 RX ORDER — AZITHROMYCIN 250 MG/1
TABLET, FILM COATED ORAL
Qty: 6 TABLET | Refills: 0 | Status: SHIPPED | OUTPATIENT
Start: 2020-10-28 | End: 2020-11-09

## 2020-10-30 ENCOUNTER — MEDICAL CORRESPONDENCE (OUTPATIENT)
Dept: HEALTH INFORMATION MANAGEMENT | Facility: CLINIC | Age: 30
End: 2020-10-30

## 2020-11-09 ENCOUNTER — PRENATAL OFFICE VISIT (OUTPATIENT)
Dept: OBGYN | Facility: OTHER | Age: 30
End: 2020-11-09
Payer: COMMERCIAL

## 2020-11-09 VITALS
SYSTOLIC BLOOD PRESSURE: 112 MMHG | WEIGHT: 134.5 LBS | HEIGHT: 62 IN | DIASTOLIC BLOOD PRESSURE: 74 MMHG | OXYGEN SATURATION: 100 % | BODY MASS INDEX: 24.75 KG/M2 | HEART RATE: 78 BPM

## 2020-11-09 DIAGNOSIS — O09.899 HISTORY OF PRETERM DELIVERY, CURRENTLY PREGNANT: ICD-10-CM

## 2020-11-09 DIAGNOSIS — Z87.59 HISTORY OF PRIOR PREGNANCY WITH SGA NEWBORN: Primary | ICD-10-CM

## 2020-11-09 PROBLEM — O36.5990 PREGNANCY AFFECTED BY FETAL GROWTH RESTRICTION: Status: RESOLVED | Noted: 2020-05-18 | Resolved: 2020-11-09

## 2020-11-09 PROBLEM — O99.820 GROUP B STREPTOCOCCUS CARRIER, +RV CULTURE, CURRENTLY PREGNANT: Status: RESOLVED | Noted: 2020-06-05 | Resolved: 2020-11-09

## 2020-11-09 LAB
ALBUMIN UR-MCNC: NEGATIVE MG/DL
APPEARANCE UR: CLEAR
BACTERIA #/AREA URNS HPF: ABNORMAL /HPF
BILIRUB UR QL STRIP: NEGATIVE
COLOR UR AUTO: YELLOW
ERYTHROCYTE [DISTWIDTH] IN BLOOD BY AUTOMATED COUNT: 13.3 % (ref 10–15)
GLUCOSE UR STRIP-MCNC: NEGATIVE MG/DL
HCT VFR BLD AUTO: 39.5 % (ref 35–47)
HGB BLD-MCNC: 13.2 G/DL (ref 11.7–15.7)
HGB UR QL STRIP: NEGATIVE
KETONES UR STRIP-MCNC: NEGATIVE MG/DL
LEUKOCYTE ESTERASE UR QL STRIP: ABNORMAL
MCH RBC QN AUTO: 30 PG (ref 26.5–33)
MCHC RBC AUTO-ENTMCNC: 33.4 G/DL (ref 31.5–36.5)
MCV RBC AUTO: 90 FL (ref 78–100)
NITRATE UR QL: NEGATIVE
NON-SQ EPI CELLS #/AREA URNS LPF: ABNORMAL /LPF
PH UR STRIP: 7 PH (ref 5–7)
PLATELET # BLD AUTO: 376 10E9/L (ref 150–450)
RBC # BLD AUTO: 4.4 10E12/L (ref 3.8–5.2)
RBC #/AREA URNS AUTO: ABNORMAL /HPF
SOURCE: ABNORMAL
SP GR UR STRIP: 1.02 (ref 1–1.03)
UROBILINOGEN UR STRIP-ACNC: 0.2 EU/DL (ref 0.2–1)
WBC # BLD AUTO: 9.5 10E9/L (ref 4–11)
WBC #/AREA URNS AUTO: ABNORMAL /HPF

## 2020-11-09 PROCEDURE — 87086 URINE CULTURE/COLONY COUNT: CPT | Performed by: OBSTETRICS & GYNECOLOGY

## 2020-11-09 PROCEDURE — 87340 HEPATITIS B SURFACE AG IA: CPT | Performed by: OBSTETRICS & GYNECOLOGY

## 2020-11-09 PROCEDURE — 86780 TREPONEMA PALLIDUM: CPT | Mod: 90 | Performed by: OBSTETRICS & GYNECOLOGY

## 2020-11-09 PROCEDURE — 99000 SPECIMEN HANDLING OFFICE-LAB: CPT | Performed by: OBSTETRICS & GYNECOLOGY

## 2020-11-09 PROCEDURE — 99207 PR FIRST OB VISIT: CPT | Performed by: OBSTETRICS & GYNECOLOGY

## 2020-11-09 PROCEDURE — 86900 BLOOD TYPING SEROLOGIC ABO: CPT | Performed by: OBSTETRICS & GYNECOLOGY

## 2020-11-09 PROCEDURE — 86901 BLOOD TYPING SEROLOGIC RH(D): CPT | Performed by: OBSTETRICS & GYNECOLOGY

## 2020-11-09 PROCEDURE — 87088 URINE BACTERIA CULTURE: CPT | Performed by: OBSTETRICS & GYNECOLOGY

## 2020-11-09 PROCEDURE — 86850 RBC ANTIBODY SCREEN: CPT | Performed by: OBSTETRICS & GYNECOLOGY

## 2020-11-09 PROCEDURE — 87389 HIV-1 AG W/HIV-1&-2 AB AG IA: CPT | Performed by: OBSTETRICS & GYNECOLOGY

## 2020-11-09 PROCEDURE — 85027 COMPLETE CBC AUTOMATED: CPT | Performed by: OBSTETRICS & GYNECOLOGY

## 2020-11-09 PROCEDURE — 81001 URINALYSIS AUTO W/SCOPE: CPT | Performed by: OBSTETRICS & GYNECOLOGY

## 2020-11-09 PROCEDURE — 86762 RUBELLA ANTIBODY: CPT | Performed by: OBSTETRICS & GYNECOLOGY

## 2020-11-09 PROCEDURE — 36415 COLL VENOUS BLD VENIPUNCTURE: CPT | Performed by: OBSTETRICS & GYNECOLOGY

## 2020-11-09 ASSESSMENT — MIFFLIN-ST. JEOR: SCORE: 1293.34

## 2020-11-09 ASSESSMENT — PATIENT HEALTH QUESTIONNAIRE - PHQ9
SUM OF ALL RESPONSES TO PHQ QUESTIONS 1-9: 0
SUM OF ALL RESPONSES TO PHQ QUESTIONS 1-9: 0
10. IF YOU CHECKED OFF ANY PROBLEMS, HOW DIFFICULT HAVE THESE PROBLEMS MADE IT FOR YOU TO DO YOUR WORK, TAKE CARE OF THINGS AT HOME, OR GET ALONG WITH OTHER PEOPLE: NOT DIFFICULT AT ALL

## 2020-11-09 NOTE — PROGRESS NOTES
30 year old  at 12w5d presents for New OB appointment.  Estimated Date of Delivery: May 19, 2021 by LMP.  She only had two periods since her last delivery.      No complaints aside from fatigue.   No vaginal bleeding, no leaking fluid or abnormal discharge, no cramping or pain.      Has occasionally used zofran.  Feeling well overall.      Electronic chart, including labs and imaging reviewed.    Last PHQ-9 score on record=   PHQ-9 SCORE 2020   PHQ-9 Total Score MyChart 0   PHQ-9 Total Score 0       Obstetric History  OB History    Para Term  AB Living   6 4 3 1 1 4   SAB TAB Ectopic Multiple Live Births   0 0 0 0 4      # Outcome Date GA Lbr Chu/2nd Weight Sex Delivery Anes PTL Lv   6 Current            5 Term 20   2.211 kg (4 lb 14 oz) M    DESIREE   4 Term 19 38w0d  2.821 kg (6 lb 3.5 oz) M  EPI  DESIREE      Name: Ishaan   3  16 34w0d  2.126 kg (4 lb 11 oz) M    DESIREE      Complications:  premature rupture of membranes (PPROM) with onset of labor after 24 hours of rupture in third trimester, antepartum   2 Term 13 39w0d  2.495 kg (5 lb 8 oz) F   N DESIREE      Complications: SGA (small for gestational age)   1 AB                  Most Recent Immunizations   Administered Date(s) Administered     HepB 2004     Hib (PRP-T) 1992     Historical DTP/aP 1999     MMR 10/11/2001     OPV, trivalent, live 1992     Poliovirus, inactivated (IPV) 2001     TD (ADULT, 7+) 1999     Varicella 2001        Patient Active Problem List   Diagnosis     Pain in joint, multiple sites     TYREL (generalized anxiety disorder)     Mild major depression (H)     Panic attack     History of prior pregnancy with SGA      History of  delivery, currently pregnant     Migraines     Dysmenorrhea     Tetanus, diphtheria, and acellular pertussis (Tdap) vaccination declined       Current Outpatient Medications   Medication      DULoxetine (CYMBALTA) 20 MG capsule     ondansetron (ZOFRAN) 8 MG tablet     Prenatal Vit-Fe Fumarate-FA (PRENATAL MULTIVITAMIN W/IRON) 27-0.8 MG tablet     No current facility-administered medications for this visit.        Allergies   Allergen Reactions     Aminothiols      Amoxicillin Difficulty breathing     Compazine [Prochlorperazine]      Penicillins        History  Past Medical History:   Diagnosis Date     Anxiety 03/2018     Depressive disorder      Pregnancy      Past Surgical History:   Procedure Laterality Date     CHOLECYSTECTOMY       GALLBLADDER SURGERY         Social History     Socioeconomic History     Marital status:      Spouse name: Not on file     Number of children: Not on file     Years of education: Not on file     Highest education level: Not on file   Occupational History     Not on file   Social Needs     Financial resource strain: Not on file     Food insecurity     Worry: Not on file     Inability: Not on file     Transportation needs     Medical: Not on file     Non-medical: Not on file   Tobacco Use     Smoking status: Never Smoker     Smokeless tobacco: Never Used   Substance and Sexual Activity     Alcohol use: No     Drug use: No     Sexual activity: Yes     Partners: Male     Birth control/protection: None     Comment: pregnant   Lifestyle     Physical activity     Days per week: Not on file     Minutes per session: Not on file     Stress: Not on file   Relationships     Social connections     Talks on phone: Not on file     Gets together: Not on file     Attends Buddhist service: Not on file     Active member of club or organization: Not on file     Attends meetings of clubs or organizations: Not on file     Relationship status: Not on file     Intimate partner violence     Fear of current or ex partner: Not on file     Emotionally abused: Not on file     Physically abused: Not on file     Forced sexual activity: Not on file   Other Topics Concern     Parent/sibling w/  "CABG, MI or angioplasty before 65F 55M? Not Asked   Social History Narrative     Not on file       ROS - Please see HPI, otherwise 10pt ROS negative.      Physical Exam  Vitals: /74 (BP Location: Right arm, Cuff Size: Adult Regular)   Pulse 78   Ht 1.575 m (5' 2\")   Wt 61 kg (134 lb 8 oz)   LMP 2020   SpO2 100%   BMI 24.60 kg/m    BMI= Body mass index is 24.6 kg/m .  Gen: Alert and oriented times 3, no acute distress.  Well developed, well nourished, pleasant.    Neck: Supple, no masses.  No thyromegaly.  Chest:  Non labored.  Clear to auscultation bilaterally.    Heart: Regular, normal S1, S2.  No murmurs.   Abdomen: Soft, nontender, nondistended.  No palpable masses.    : deferred      Assessment and Plan:  30 year old  with IUP at 12w5d.        ICD-10-CM    1. History of prior pregnancy with SGA   Z87.59 Hepatitis B surface antigen     Rubella Antibody IgG Quantitative     ABO/Rh type and screen     HIV Antigen Antibody Combo     Urine Culture Aerobic Bacterial     *UA reflex to Microscopic     CBC with platelets     Treponema Abs w Reflex to RPR and Titer     US OB < 14 Weeks Single   2. History of  delivery, currently pregnant  O09.899        History of PTD and SGA.  Plan MFM referral for cervical length and level 2 US.  Plan serial growth US.    Short interval pregnancy.   Discussed genetic screening options:  declined  Ordered labs and ultrasound  Folder previously given.    Body mass index is 24.6 kg/m . - recommend 25-35 lbs (BMI 18.5-24.9) Return in 4 weeks for routine OB care          Diane Dolan MD FACOG   Answers for HPI/ROS submitted by the patient on 2020   If you checked off any problems, how difficult have these problems made it for you to do your work, take care of things at home, or get along with other people?: Not difficult at all  PHQ9 TOTAL SCORE: 0    "

## 2020-11-10 LAB
ABO + RH BLD: NORMAL
ABO + RH BLD: NORMAL
BLD GP AB SCN SERPL QL: NORMAL
BLOOD BANK CMNT PATIENT-IMP: NORMAL
HBV SURFACE AG SERPL QL IA: NONREACTIVE
HIV 1+2 AB+HIV1 P24 AG SERPL QL IA: NONREACTIVE
RUBV IGG SERPL IA-ACNC: 10 IU/ML
SPECIMEN EXP DATE BLD: NORMAL
T PALLIDUM AB SER QL: NONREACTIVE

## 2020-11-11 PROBLEM — O99.820 URINARY TRACT COLONIZATION BY GROUP B STREPTOCOCCUS AFFECTING PREGNANCY: Status: ACTIVE | Noted: 2020-11-11

## 2020-11-11 LAB
BACTERIA SPEC CULT: ABNORMAL
Lab: ABNORMAL
SPECIMEN SOURCE: ABNORMAL

## 2020-11-12 ENCOUNTER — ANCILLARY PROCEDURE (OUTPATIENT)
Dept: ULTRASOUND IMAGING | Facility: OTHER | Age: 30
End: 2020-11-12
Attending: OBSTETRICS & GYNECOLOGY
Payer: COMMERCIAL

## 2020-11-12 DIAGNOSIS — Z87.59 HISTORY OF PRIOR PREGNANCY WITH SGA NEWBORN: ICD-10-CM

## 2020-11-13 ENCOUNTER — MYC MEDICAL ADVICE (OUTPATIENT)
Dept: OBGYN | Facility: OTHER | Age: 30
End: 2020-11-13

## 2020-11-13 ENCOUNTER — MEDICAL CORRESPONDENCE (OUTPATIENT)
Dept: HEALTH INFORMATION MANAGEMENT | Facility: CLINIC | Age: 30
End: 2020-11-13

## 2020-11-13 NOTE — TELEPHONE ENCOUNTER
Per 11/12 US: GINGER 5/24/2021.  Per first prenatal visit it looks like her GINGER was 5/19/2021 based off her LMP.

## 2020-11-14 ENCOUNTER — HEALTH MAINTENANCE LETTER (OUTPATIENT)
Age: 30
End: 2020-11-14

## 2020-12-01 DIAGNOSIS — O21.0 HYPEREMESIS GRAVIDARUM: ICD-10-CM

## 2020-12-01 RX ORDER — ONDANSETRON 8 MG/1
8 TABLET, FILM COATED ORAL EVERY 8 HOURS PRN
Qty: 30 TABLET | Refills: 1 | Status: SHIPPED | OUTPATIENT
Start: 2020-12-01 | End: 2021-06-17

## 2020-12-01 NOTE — TELEPHONE ENCOUNTER
"Requested Prescriptions   Pending Prescriptions Disp Refills     ondansetron (ZOFRAN) 8 MG tablet 30 tablet 1     Sig: Take 1 tablet (8 mg) by mouth every 8 hours as needed for nausea        Antivertigo/Antiemetic Agents Passed - 12/1/2020 12:03 PM        Passed - Recent (12 mo) or future (30 days) visit within the authorizing provider's specialty     Patient has had an office visit with the authorizing provider or a provider within the authorizing providers department within the previous 12 mos or has a future within next 30 days. See \"Patient Info\" tab in inbasket, or \"Choose Columns\" in Meds & Orders section of the refill encounter.              Passed - Medication is active on med list        Passed - Patient is 18 years of age or older           Prescription approved per [facility] Refill Protocol.    Kimber Bolanos RN    "

## 2020-12-02 ENCOUNTER — VIRTUAL VISIT (OUTPATIENT)
Dept: FAMILY MEDICINE | Facility: OTHER | Age: 30
End: 2020-12-02
Payer: COMMERCIAL

## 2020-12-02 ENCOUNTER — MYC MEDICAL ADVICE (OUTPATIENT)
Dept: FAMILY MEDICINE | Facility: OTHER | Age: 30
End: 2020-12-02

## 2020-12-02 DIAGNOSIS — Z20.822 SUSPECTED COVID-19 VIRUS INFECTION: ICD-10-CM

## 2020-12-02 DIAGNOSIS — Z20.822 SUSPECTED 2019 NOVEL CORONAVIRUS INFECTION: Primary | ICD-10-CM

## 2020-12-02 DIAGNOSIS — Z20.822 SUSPECTED COVID-19 VIRUS INFECTION: Primary | ICD-10-CM

## 2020-12-02 PROCEDURE — 99421 OL DIG E/M SVC 5-10 MIN: CPT | Performed by: PHYSICIAN ASSISTANT

## 2020-12-02 PROCEDURE — U0003 INFECTIOUS AGENT DETECTION BY NUCLEIC ACID (DNA OR RNA); SEVERE ACUTE RESPIRATORY SYNDROME CORONAVIRUS 2 (SARS-COV-2) (CORONAVIRUS DISEASE [COVID-19]), AMPLIFIED PROBE TECHNIQUE, MAKING USE OF HIGH THROUGHPUT TECHNOLOGIES AS DESCRIBED BY CMS-2020-01-R: HCPCS | Performed by: FAMILY MEDICINE

## 2020-12-02 NOTE — PROGRESS NOTES
"Date: 2020 12:22:07  Clinician: Thelma Mcleod  Clinician NPI: [id number]  Patient: Lizzie Oconnell  Patient : 1990  Patient Address: [address] [place] [id number]  Patient Phone: [phone]  Visit Protocol: URI  Patient Summary:  Lizzie is a 30 year old ( : 1990 ) female who initiated a [facility] Visit for COVID-19 (Coronavirus) evaluation and screening. When asked the question \"Please sign me up to receive news, health information and promotions from [facility].\", Lizzie responded \"No\".    Lizzie states her symptoms started gradually 5-6 days ago. After her symptoms started, they improved and then got worse again.   Her symptoms consist of ear pain, a headache, a cough, nasal congestion, nausea, rhinitis, facial pain or pressure, myalgia, chills, malaise, a sore throat, tooth pain, ageusia, diarrhea, and anosmia.   Symptom details     Nasal secretions: The color of her mucus is yellow and clear.    Cough: Lizzie coughs a few times an hour and her cough is more bothersome at night. Phlegm comes into her throat when she coughs. She believes her cough is caused by post-nasal drip. The color of the phlegm is white and clear.     Sore throat: Lizzie reports having mild throat pain (1-3 on a 10 point pain scale), does not have exudate on her tonsils, and can swallow liquids. She is not sure if the lymph nodes in her neck are enlarged. A rash has not appeared on the skin since the sore throat started.     Facial pain or pressure: The facial pain or pressure feels worse when bending over or leaning forward.     Headache: She states the headache is moderate (4-6 on a 10 point pain scale).     Tooth pain: The tooth pain is not caused by a cavity, recent dental work, or other mouth problems.      Lizzie denies having wheezing, fever, and vomiting. She also denies having recent facial or sinus surgery in the past 60 days. She is not experiencing dyspnea.   Precipitating events  Within the past week, Lizzie " has not been exposed to someone with strep throat. She has not recently been exposed to someone with influenza. Lizzie has been in close contact with the following high risk individuals: children under the age of 5 and pregnant women.   Pertinent COVID-19 (Coronavirus) information  Lizzie does not work or volunteer as healthcare worker or a . In the past 14 days, Lizzie has not worked or volunteered at a healthcare facility or group living setting.   In the past 14 days, she also has not lived in a congregate living setting.   Lizzie has not had a close contact with a laboratory-confirmed COVID-19 patient within 14 days of symptom onset.    Since December 2019, Lizzie has been tested for COVID-19 and has had upper respiratory infection (URI) or influenza-like illness.      Result of COVID-19 test: Negative     Date of her COVID-19 test: 10/16/2020     Date(s) of previous URI or influenza-like illness (free-text): October 14,2020- November 1,2020     Symptoms Lizzie experienced during previous URI or influenza-like illness as reported by the patient (free-text): Sinus pressure, congestion, cough, headache, sore throat        Pertinent medical history  Lizzie had 2 sinus infections within the past year.   Lizzie has taken an antibiotic medication in the past month. Antibiotic details as reported by the patient (free text): azithromycin For sinus infection   She has been told by her provider to avoid NSAIDs.   Lizzie does not get yeast infections when she takes antibiotics.   Lizzie does not have diabetes. She denies having immunosuppressive conditions (e.g., chemotherapy, HIV, organ transplant, long-term use of steroids or other immunosuppressive medications, splenectomy). She does not have severe COPD and congestive heart failure. She does not have asthma.   Lizzie does not need a return to work/school note.   Weight: 132 lbs   Lizzie does not smoke or use smokeless tobacco.   She is pregnant and denies  breastfeeding.   Weight: 132 lbs    MEDICATIONS: duloxetine HCl (bulk), ALLERGIES: Penicillins, amoxicillin  Clinician Response:  Dear Lizzie,   Your symptoms show that you may have coronavirus (COVID-19). This illness can cause fever, cough and trouble breathing. Many people get a mild case and get better on their own. Some people can get very sick.  What should I do?  We would like to test you for this virus.   1. Please call [phone] to schedule your visit. Explain that you were referred by [facility] to have a COVID-19 test. Be ready to share your [facility] visit ID number.  * If you need to schedule in [facility] please call [phone] or for [employer] employees please call [phone].  * If you need to schedule in the [place] area please call [phone]. [employer] employees call [phone].  The following will serve as your written order for this COVID Test, ordered by me, for the indication of suspected COVID [Z20.828]: The test will be ordered in [employer], our electronic health record, after you are scheduled. It will show as ordered and authorized by Montez Martinez MD.  Order: COVID-19 (Coronavirus) PCR for SYMPTOMATIC testing from [facility].   2. When it's time for your COVID test:  Stay at least 6 feet away from others. (If someone will drive you to your test, stay in the backseat, as far away from the  as you can.)   Cover your mouth and nose with a mask, tissue or washcloth.  Go straight to the testing site. Don't make any stops on the way there or back.      3.Starting now: Stay home and away from others (self-isolate) until:   You've had no fever---and no medicine that reduces fever---for one full day (24 hours). And...   Your other symptoms have gotten better. For example, your cough or breathing has improved. And...   At least 10 days have passed since your symptoms started.       During this time, don't leave the house except for testing or medical care.   Stay in your own room, even for meals. Use  "your own bathroom if you can.   Stay away from others in your home. No hugging, kissing or shaking hands. No visitors.  Don't go to work, school or anywhere else.    Clean \"high touch\" surfaces often (doorknobs, counters, handles, etc.). Use a household cleaning spray or wipes. You'll find a full list of  on the EPA website: www.epa.gov/pesticide-registration/list-n-disinfectants-use-against-sars-cov-2.   Cover your mouth and nose with a mask, tissue or washcloth to avoid spreading germs.  Wash your hands and face often. Use soap and water.  Caregivers in these groups are at risk for severe illness due to COVID-19:  o People 65 years and older  o People who live in a nursing home or long-term care facility  o People with chronic disease (lung, heart, cancer, diabetes, kidney, liver, immunologic)  o People who have a weakened immune system, including those who:   Are in cancer treatment  Take medicine that weakens the immune system, such as corticosteroids  Had a bone marrow or organ transplant  Have an immune deficiency  Have poorly controlled HIV or AIDS  Are obese (body mass index of 40 or higher)  Smoke regularly   o Caregivers should wear gloves while washing dishes, handling laundry and cleaning bedrooms and bathrooms.  o Use caution when washing and drying laundry: Don't shake dirty laundry, and use the warmest water setting that you can.  o For more tips, go to www.cdc.gov/coronavirus/2019-ncov/downloads/10Things.pdf.    4.Sign up for [employer]. We know it's scary to hear that you might have COVID-19. We want to track your symptoms to make sure you're okay over the next 2 weeks. Please look for an email from [employer]---this is a free, online program that we'll use to keep in touch. To sign up, follow the link in the email. Learn more at [email or web address]  How can I take care of myself?   Get lots of rest. Drink extra fluids (unless a doctor has told you not to).   Take Tylenol " (acetaminophen) for fever or pain. If you have liver or kidney problems, ask your family doctor if it's okay to take Tylenol.   Adults can take either:    650 mg (two 325 mg pills) every 4 to 6 hours, or...   1,000 mg (two 500 mg pills) every 8 hours as needed.    Note: Don't take more than 3,000 mg in one day. Acetaminophen is found in many medicines (both prescribed and over-the-counter medicines). Read all labels to be sure you don't take too much.   For children, check the Tylenol bottle for the right dose. The dose is based on the child's age or weight.    If you have other health problems (like cancer, heart failure, an organ transplant or severe kidney disease): Call your specialty clinic if you don't feel better in the next 2 days.       Know when to call 911. Emergency warning signs include:    Trouble breathing or shortness of breath Pain or pressure in the chest that doesn't go away Feeling confused like you haven't felt before, or not being able to wake up Bluish-colored lips or face.  Where can I get more information?   [facility] -- About COVID-19: [email or web address]   CDC -- What to Do If You're Sick: www.cdc.gov/coronavirus/2019-ncov/about/steps-when-sick.html   CDC -- Ending Home Isolation: www.cdc.gov/coronavirus/2019-ncov/hcp/disposition-in-home-patients.html   CDC -- Caring for Someone: www.cdc.gov/coronavirus/2019-ncov/if-you-are-sick/care-for-someone.html   [facility] -- Interim Guidance for Hospital Discharge to Home: www.health.[facility].mn.us/diseases/coronavirus/hcp/hospdischarge.pdf   [facility] clinical trials (COVID-19 research studies): clinicalaffairs.[facility]/umn-clinical-trials    Below are the COVID-19 hotlines at the [place] Department of Health ([facility]). Interpreters are available.    For health questions: Call 660-647-7294 or 1-245.172.4890 (7 a.m. to 7 p.m.) For questions about schools and childcare: Call 418-194-0202 or 1-183.127.3929 (7 a.m. to 7 p.m.)     Diagnosis: Contact with and (suspected) exposure to other viral communicable diseases  Diagnosis ICD: Z20.828

## 2020-12-03 LAB
SARS-COV-2 RNA SPEC QL NAA+PROBE: ABNORMAL
SPECIMEN SOURCE: ABNORMAL

## 2020-12-09 NOTE — PROGRESS NOTES
Presents for routine  appointment.     No complaints.    No abnormal discharge , no leaking fluid , no contractions , no vaginal bleeding    ROS:   and GI  negative.     Please see Prenatal Vitals and Notes Flowsheet for objective data.  Hemoglobin   Date Value Ref Range Status   2020 11.8 11.7 - 15.7 g/dL Final     Lab Results   Component Value Date    ABO A 2019    RH Pos 2019       A/P:  30 year old  at 28w2d       ICD-10-CM    1. History of prior pregnancy with SGA   Z87.59 US OB >14 Weeks Follow Up   2. History of  delivery, currently pregnant  O09.219        GCT Normal  TDAP next visit.    Rhogam: not  needed  Discussed kick counts   Continue Cymbalta  Growth US due to history of SGA.  Follow up in 4 weeks.  HR, so keep appointment      Diane Dolan MD       CHIEF COMPLAINT:  Emily Sky is here today for Subsequent Annual Medicare Wellness Visit. Medication verified and med list updated    Refills needed today? No          Patient would like communication of their results via:     Cell Phone:   Telephone Information:   Mobile [phone] to leave a message containing results? Yes     CHOLESTEROL (mg/dL)   Date Value   07/01/2019 110     Cholesterol (mg/dL)   Date Value   12/07/2020 148     HDL (mg/dL)   Date Value   12/07/2020 39 (L)   07/01/2019 37 (L)     No components found for: CHOLHDLRATIO  TRIGLYCERIDE (mg/dL)   Date Value   07/01/2019 197 (H)     Triglycerides (mg/dL)   Date Value   12/07/2020 292 (H)     CALCULATED LDL (mg/dL)   Date Value   07/01/2019 34     LDL (mg/dL)   Date Value   12/07/2020 51      Glucose (mg/dL)   Date Value   12/07/2020 106 (H)   11/26/2019 88       Health Maintenance Due   Topic Date Due   â¢ DTaP/Tdap/Td Vaccine (1 - Tdap) 10/24/1964   â¢ Shingles Vaccine (1 of 2) 10/24/1995   â¢ Diabetes Eye Exam  06/19/2020   â¢ Medicare Wellness Visit  12/09/2020       Patient is due for topics as listed above but is not proceeding with Immunization(s) Shingles at this time.

## 2020-12-16 ENCOUNTER — TRANSFERRED RECORDS (OUTPATIENT)
Dept: HEALTH INFORMATION MANAGEMENT | Facility: CLINIC | Age: 30
End: 2020-12-16

## 2020-12-29 ENCOUNTER — TRANSFERRED RECORDS (OUTPATIENT)
Dept: HEALTH INFORMATION MANAGEMENT | Facility: CLINIC | Age: 30
End: 2020-12-29

## 2021-01-12 ENCOUNTER — TRANSFERRED RECORDS (OUTPATIENT)
Dept: HEALTH INFORMATION MANAGEMENT | Facility: CLINIC | Age: 31
End: 2021-01-12

## 2021-01-14 DIAGNOSIS — F41.1 GAD (GENERALIZED ANXIETY DISORDER): ICD-10-CM

## 2021-01-14 RX ORDER — DULOXETIN HYDROCHLORIDE 20 MG/1
CAPSULE, DELAYED RELEASE ORAL
Qty: 90 CAPSULE | Refills: 3 | Status: SHIPPED | OUTPATIENT
Start: 2021-01-14 | End: 2021-09-22 | Stop reason: DRUGHIGH

## 2021-01-14 NOTE — TELEPHONE ENCOUNTER
"Requested Prescriptions   Pending Prescriptions Disp Refills     DULoxetine (CYMBALTA) 20 MG capsule 90 capsule 3     Sig: TAKE 1 CAPSULE BY MOUTH EVERY DAY       Serotonin-Norepinephrine Reuptake Inhibitors  Failed - 2021  8:39 AM        Failed - No active pregnancy on record        Passed - Blood pressure under 140/90 in past 12 months     BP Readings from Last 3 Encounters:   20 112/74   20 115/79   20 124/84                 Passed - Recent (12 mo) or future (30 days) visit within the authorizing provider's specialty     Patient has had an office visit with the authorizing provider or a provider within the authorizing providers department within the previous 12 mos or has a future within next 30 days. See \"Patient Info\" tab in inbasket, or \"Choose Columns\" in Meds & Orders section of the refill encounter.              Passed - Medication is active on med list        Passed - Patient is age 18 or older        Passed - No positive pregnancy test in past 12 months           , 21w3d.  Pt was last seen in clinic for a New OB appt on 2020 with Dr. Dolan. No other prenatal care within our group and no future appts scheduled.    Routing refill request to provider for review/approval because:  Patient needs to be seen because:  Pt has not been seen for prenatal care since 2020.  Current pregnancy on record.    Kmiber Bolanos RN            "

## 2021-01-26 ENCOUNTER — TRANSFERRED RECORDS (OUTPATIENT)
Dept: HEALTH INFORMATION MANAGEMENT | Facility: CLINIC | Age: 31
End: 2021-01-26

## 2021-02-01 ENCOUNTER — MYC MEDICAL ADVICE (OUTPATIENT)
Dept: OBGYN | Facility: OTHER | Age: 31
End: 2021-02-01

## 2021-02-09 ENCOUNTER — TRANSFERRED RECORDS (OUTPATIENT)
Dept: HEALTH INFORMATION MANAGEMENT | Facility: CLINIC | Age: 31
End: 2021-02-09

## 2021-02-12 ENCOUNTER — TRANSFERRED RECORDS (OUTPATIENT)
Dept: HEALTH INFORMATION MANAGEMENT | Facility: CLINIC | Age: 31
End: 2021-02-12

## 2021-02-16 ENCOUNTER — TRANSFERRED RECORDS (OUTPATIENT)
Dept: HEALTH INFORMATION MANAGEMENT | Facility: CLINIC | Age: 31
End: 2021-02-16

## 2021-02-18 DIAGNOSIS — O09.899 HISTORY OF PRETERM DELIVERY, CURRENTLY PREGNANT: ICD-10-CM

## 2021-02-18 LAB
GLUCOSE 1H P 50 G GLC PO SERPL-MCNC: 76 MG/DL (ref 60–129)
HGB BLD-MCNC: 11.9 G/DL (ref 11.7–15.7)

## 2021-02-18 PROCEDURE — 85018 HEMOGLOBIN: CPT | Performed by: OBSTETRICS & GYNECOLOGY

## 2021-02-18 PROCEDURE — 36415 COLL VENOUS BLD VENIPUNCTURE: CPT | Performed by: OBSTETRICS & GYNECOLOGY

## 2021-02-18 PROCEDURE — 86780 TREPONEMA PALLIDUM: CPT | Mod: 90 | Performed by: OBSTETRICS & GYNECOLOGY

## 2021-02-18 PROCEDURE — 99000 SPECIMEN HANDLING OFFICE-LAB: CPT | Performed by: OBSTETRICS & GYNECOLOGY

## 2021-02-18 PROCEDURE — 82950 GLUCOSE TEST: CPT | Performed by: OBSTETRICS & GYNECOLOGY

## 2021-02-19 ENCOUNTER — TRANSFERRED RECORDS (OUTPATIENT)
Dept: HEALTH INFORMATION MANAGEMENT | Facility: CLINIC | Age: 31
End: 2021-02-19

## 2021-02-19 PROBLEM — O36.5990 PREGNANCY AFFECTED BY FETAL GROWTH RESTRICTION: Status: ACTIVE | Noted: 2021-02-19

## 2021-02-19 PROBLEM — O43.199 ACCESSORY PLACENTA: Status: ACTIVE | Noted: 2021-02-19

## 2021-02-19 LAB — T PALLIDUM AB SER QL: NONREACTIVE

## 2021-02-23 ENCOUNTER — TRANSFERRED RECORDS (OUTPATIENT)
Dept: HEALTH INFORMATION MANAGEMENT | Facility: CLINIC | Age: 31
End: 2021-02-23

## 2021-02-26 ENCOUNTER — TRANSFERRED RECORDS (OUTPATIENT)
Dept: HEALTH INFORMATION MANAGEMENT | Facility: CLINIC | Age: 31
End: 2021-02-26

## 2021-03-01 ENCOUNTER — PRENATAL OFFICE VISIT (OUTPATIENT)
Dept: OBGYN | Facility: OTHER | Age: 31
End: 2021-03-01
Payer: COMMERCIAL

## 2021-03-01 ENCOUNTER — TRANSFERRED RECORDS (OUTPATIENT)
Dept: HEALTH INFORMATION MANAGEMENT | Facility: CLINIC | Age: 31
End: 2021-03-01

## 2021-03-01 VITALS
BODY MASS INDEX: 26.66 KG/M2 | SYSTOLIC BLOOD PRESSURE: 108 MMHG | WEIGHT: 145.75 LBS | HEART RATE: 74 BPM | DIASTOLIC BLOOD PRESSURE: 78 MMHG | OXYGEN SATURATION: 100 %

## 2021-03-01 DIAGNOSIS — O99.820: ICD-10-CM

## 2021-03-01 DIAGNOSIS — O09.899 HISTORY OF PRETERM DELIVERY, CURRENTLY PREGNANT: ICD-10-CM

## 2021-03-01 DIAGNOSIS — Z28.21 TETANUS, DIPHTHERIA, AND ACELLULAR PERTUSSIS (TDAP) VACCINATION DECLINED: ICD-10-CM

## 2021-03-01 DIAGNOSIS — O36.5930 MATERNAL CARE FOR POOR FETAL GROWTH IN THIRD TRIMESTER, SINGLE OR UNSPECIFIED FETUS: Primary | ICD-10-CM

## 2021-03-01 DIAGNOSIS — O43.193 PLACENTA SUCCENTURIATA IN THIRD TRIMESTER: ICD-10-CM

## 2021-03-01 PROCEDURE — 99207 PR PRENATAL VISIT: CPT | Performed by: OBSTETRICS & GYNECOLOGY

## 2021-03-01 NOTE — PROGRESS NOTES
Presents for routine  appointment.     No complaints.    No abnormal discharge , no leaking fluid , no contractions , no vaginal bleeding    ROS:   and GI  negative.     Please see Prenatal Vitals and Notes Flowsheet for objective data.  Hemoglobin   Date Value Ref Range Status   2021 11.9 11.7 - 15.7 g/dL Final     Lab Results   Component Value Date    ABO A 2020    RH Pos 2020       A/P:  31 year old  at 28w0d       ICD-10-CM    1. Maternal care for poor fetal growth in third trimester, single or unspecified fetus  O36.5930    2. Placenta succenturiata in third trimester  O43.193    3. History of  delivery, currently pregnant  O09.899    4. Tetanus, diphtheria, and acellular pertussis (Tdap) vaccination declined  Z28.21    5. Urinary tract colonization by group B Streptococcus affecting pregnancy  O99.820        GCT Normal  TDAP today.    anterior placenta with posterior accessory lobe  Fetal growth restriction. Intermittent abnormal UAR. - continue to see MFM  Discussed kick counts   Follow up in 4 weeks.      Diane Dolan MD    [facility] LIMITED W UAR3  [facility]   Result Impression   Indication  ========    Fetal growth restriction. Intermittent abnormal UAR.    Method  ======    Transabdominal ultrasound examination    Pregnancy  =========    Puga pregnancy. Number of fetuses: 1    Dating  ======                                             Date                                Details                                                                                      Gest. age                      GINGER  LMP                                  2020                                                                                                                         28 w + 5 d                     2021  Previous U/S                      2020                       GA, GA 12 w + 3 d                                                                      28 w + 0 d                     2021  Assigned dating                  based on ultrasound (GA), selected on 2021                                                                28 w + 0 d                     2021    General Evaluation  ==============    Cardiac activity present.  bpm. Fetal movements: present. Presentation: breech  Placenta: Anterior with posterior accessory lobe, No Previa, > 2 cm from internal os.  Umbilical cord: previously studied  Amniotic fluid: Amount of AF: normal. MVP 4.6 cm    Fetal Doppler  ===========    Umbilical Artery: abnormal, Intermittent Elevated PI  PI                         1.47                                                                             99%         Saloni  S / D                    4.53                                                                              97%        Saloni  HR                       153                                 bpm    Maternal Structures  ===============    Right Ovary                          Not visualized  Left Ovary                            Not visualized    Non Stress Test  =============    Baseline  bpm. Baseline variability: moderate. Accelerations: present. Decelerations: absent. Uterine activity: absent. CTG category: normal/reassuring    Impression  =========    1) Puga intrauterine pregnancy at 28w 0d gestational age.  2) The NST is reactive.  3) The amniotic fluid volume appeared normal.  4) There is intermittent increased resistance in the UA Doppler.    Recommendation  ==============    We discussed the findings on today's ultrasound with the patient.    Continue  surveillance with twice weekly NST, amniotic fluid and UA Doppler assessment.    Return to primary provider for continued prenatal care.    Thank-you for the opportunity to participate in the care of this patient. If you have questions regarding today's evaluation or if we can be of further service,  please contact the  [facility].    **Fetal anomalies may be present but not detected**    REPORT SIGNED BY: ERMA HELM MD

## 2021-03-04 ENCOUNTER — TRANSFERRED RECORDS (OUTPATIENT)
Dept: HEALTH INFORMATION MANAGEMENT | Facility: CLINIC | Age: 31
End: 2021-03-04

## 2021-03-08 ENCOUNTER — TRANSFERRED RECORDS (OUTPATIENT)
Dept: HEALTH INFORMATION MANAGEMENT | Facility: CLINIC | Age: 31
End: 2021-03-08

## 2021-03-11 ENCOUNTER — TRANSFERRED RECORDS (OUTPATIENT)
Dept: HEALTH INFORMATION MANAGEMENT | Facility: CLINIC | Age: 31
End: 2021-03-11

## 2021-03-15 ENCOUNTER — TRANSFERRED RECORDS (OUTPATIENT)
Dept: HEALTH INFORMATION MANAGEMENT | Facility: CLINIC | Age: 31
End: 2021-03-15

## 2021-03-18 ENCOUNTER — TRANSFERRED RECORDS (OUTPATIENT)
Dept: HEALTH INFORMATION MANAGEMENT | Facility: CLINIC | Age: 31
End: 2021-03-18

## 2021-03-22 ENCOUNTER — PRENATAL OFFICE VISIT (OUTPATIENT)
Dept: OBGYN | Facility: CLINIC | Age: 31
End: 2021-03-22
Payer: COMMERCIAL

## 2021-03-22 VITALS
WEIGHT: 150.4 LBS | DIASTOLIC BLOOD PRESSURE: 75 MMHG | SYSTOLIC BLOOD PRESSURE: 121 MMHG | OXYGEN SATURATION: 100 % | HEART RATE: 103 BPM | BODY MASS INDEX: 27.51 KG/M2

## 2021-03-22 DIAGNOSIS — O09.899 HISTORY OF PRETERM DELIVERY, CURRENTLY PREGNANT: ICD-10-CM

## 2021-03-22 DIAGNOSIS — O43.193 PLACENTA SUCCENTURIATA IN THIRD TRIMESTER: ICD-10-CM

## 2021-03-22 DIAGNOSIS — O36.5930 MATERNAL CARE FOR POOR FETAL GROWTH IN THIRD TRIMESTER, SINGLE OR UNSPECIFIED FETUS: Primary | ICD-10-CM

## 2021-03-22 PROCEDURE — 99207 PR PRENATAL VISIT: CPT | Performed by: OBSTETRICS & GYNECOLOGY

## 2021-03-22 NOTE — PROGRESS NOTES
Presents for routine  appointment.     No complaints.    No abnormal discharge , no leaking fluid , no contractions , no vaginal bleeding    ROS:   and GI  negative.     Please see Prenatal Vitals and Notes Flowsheet for objective data.    A/P:  31 year old  at 31w0d       ICD-10-CM    1. Maternal care for poor fetal growth in third trimester, single or unspecified fetus  O36.5930    2. Placenta succenturiata in third trimester  O43.193    3. History of  delivery, currently pregnant  O09.899        Tdap declined  She will continue with MFM for FGR.  She is seeing them this week.    Follow up in 2 weeks.      Diane Dolan MD

## 2021-03-23 ENCOUNTER — TRANSFERRED RECORDS (OUTPATIENT)
Dept: HEALTH INFORMATION MANAGEMENT | Facility: CLINIC | Age: 31
End: 2021-03-23

## 2021-03-26 ENCOUNTER — TRANSFERRED RECORDS (OUTPATIENT)
Dept: HEALTH INFORMATION MANAGEMENT | Facility: CLINIC | Age: 31
End: 2021-03-26

## 2021-03-30 ENCOUNTER — TRANSFERRED RECORDS (OUTPATIENT)
Dept: HEALTH INFORMATION MANAGEMENT | Facility: CLINIC | Age: 31
End: 2021-03-30

## 2021-04-01 ENCOUNTER — MYC MEDICAL ADVICE (OUTPATIENT)
Dept: OBGYN | Facility: CLINIC | Age: 31
End: 2021-04-01

## 2021-04-01 DIAGNOSIS — R30.0 DYSURIA: Primary | ICD-10-CM

## 2021-04-02 ENCOUNTER — TRANSFERRED RECORDS (OUTPATIENT)
Dept: HEALTH INFORMATION MANAGEMENT | Facility: CLINIC | Age: 31
End: 2021-04-02

## 2021-04-02 DIAGNOSIS — R30.0 DYSURIA: Primary | ICD-10-CM

## 2021-04-02 DIAGNOSIS — R30.0 DYSURIA: ICD-10-CM

## 2021-04-02 LAB

## 2021-04-02 PROCEDURE — 81001 URINALYSIS AUTO W/SCOPE: CPT | Performed by: OBSTETRICS & GYNECOLOGY

## 2021-04-02 NOTE — CONFIDENTIAL NOTE
Please let patient know I placed the lab urine order.  She can get that done at anytime and I'll let her know the results when I have them.  Thanks.    Rhoda Israel, DO

## 2021-04-02 NOTE — TELEPHONE ENCOUNTER
, 32w 4d  -Poor fetal growth,  -Placenta succenturiata  -hx  delivery    RN called pt to speak regarding her [employer] message from 21 @ 23:38.    Pt had been having vaginal pain/pressure as well as rectal pressure and contractions from 5pm-midnight.  States was sharp shooting intermittent vaginal pain.  Contractions between 5-6:30pm where strong/painful, pt almost called and/or came in for evaluation but when laid down contractions went away.  Had 12 contractions between 5pm-mindnight.      Pt denies pain w/urination or burning, but unsure if is a UTI.    Woke up today at 6:00am, no contractions that were regular or bothersome over night or this morning. Vaginal pain is minimal. No further vaginal/rectal pressure.    Has MFM appt today at 3pm.  RN offered morning clinic appt but is unable since has all 4 kids/no childcare until her 3pm MFM appt.      Routing to provider for advice, possibly lab only appt, which pt could do later this afternoon.

## 2021-04-06 ENCOUNTER — TRANSFERRED RECORDS (OUTPATIENT)
Dept: HEALTH INFORMATION MANAGEMENT | Facility: CLINIC | Age: 31
End: 2021-04-06

## 2021-04-08 ENCOUNTER — PRENATAL OFFICE VISIT (OUTPATIENT)
Dept: OBGYN | Facility: OTHER | Age: 31
End: 2021-04-08
Payer: COMMERCIAL

## 2021-04-08 VITALS — DIASTOLIC BLOOD PRESSURE: 76 MMHG | BODY MASS INDEX: 27.62 KG/M2 | SYSTOLIC BLOOD PRESSURE: 110 MMHG | WEIGHT: 151 LBS

## 2021-04-08 DIAGNOSIS — O36.5990 PREGNANCY AFFECTED BY FETAL GROWTH RESTRICTION: Primary | ICD-10-CM

## 2021-04-08 PROCEDURE — 99207 PR PRENATAL VISIT: CPT | Performed by: ADVANCED PRACTICE MIDWIFE

## 2021-04-08 NOTE — PROGRESS NOTES
Feeling well. No complaints.   Has appointments scheduled.   Worried because baby is breech still. Does not want tubal if she has a c/s  No regular contra/lof/vb  Will think about TDAP  Will decline COVID.  Baby is a girl.  Has peds.  Will formula feed. Will do vasectomy.   RTC 2 weeks.   Stefanie Tripathi, DENNYS, CNM

## 2021-04-09 ENCOUNTER — TRANSFERRED RECORDS (OUTPATIENT)
Dept: HEALTH INFORMATION MANAGEMENT | Facility: CLINIC | Age: 31
End: 2021-04-09

## 2021-04-13 ENCOUNTER — TRANSFERRED RECORDS (OUTPATIENT)
Dept: HEALTH INFORMATION MANAGEMENT | Facility: CLINIC | Age: 31
End: 2021-04-13

## 2021-04-16 ENCOUNTER — TRANSFERRED RECORDS (OUTPATIENT)
Dept: HEALTH INFORMATION MANAGEMENT | Facility: CLINIC | Age: 31
End: 2021-04-16

## 2021-04-20 ENCOUNTER — TRANSFERRED RECORDS (OUTPATIENT)
Dept: HEALTH INFORMATION MANAGEMENT | Facility: CLINIC | Age: 31
End: 2021-04-20

## 2021-04-22 ENCOUNTER — PRENATAL OFFICE VISIT (OUTPATIENT)
Dept: OBGYN | Facility: OTHER | Age: 31
End: 2021-04-22
Payer: COMMERCIAL

## 2021-04-22 VITALS
BODY MASS INDEX: 28.03 KG/M2 | WEIGHT: 153.25 LBS | OXYGEN SATURATION: 100 % | SYSTOLIC BLOOD PRESSURE: 120 MMHG | DIASTOLIC BLOOD PRESSURE: 78 MMHG | HEART RATE: 96 BPM

## 2021-04-22 DIAGNOSIS — O43.193 PLACENTA SUCCENTURIATA IN THIRD TRIMESTER: ICD-10-CM

## 2021-04-22 DIAGNOSIS — O32.2XX0 OBLIQUE LIE OF FETUS, SINGLE OR UNSPECIFIED FETUS: ICD-10-CM

## 2021-04-22 DIAGNOSIS — O36.5990 PREGNANCY AFFECTED BY FETAL GROWTH RESTRICTION: Primary | ICD-10-CM

## 2021-04-22 PROCEDURE — 99207 PR PRENATAL VISIT: CPT | Performed by: OBSTETRICS & GYNECOLOGY

## 2021-04-22 ASSESSMENT — PATIENT HEALTH QUESTIONNAIRE - PHQ9
SUM OF ALL RESPONSES TO PHQ QUESTIONS 1-9: 2
10. IF YOU CHECKED OFF ANY PROBLEMS, HOW DIFFICULT HAVE THESE PROBLEMS MADE IT FOR YOU TO DO YOUR WORK, TAKE CARE OF THINGS AT HOME, OR GET ALONG WITH OTHER PEOPLE: SOMEWHAT DIFFICULT
SUM OF ALL RESPONSES TO PHQ QUESTIONS 1-9: 2

## 2021-04-23 ENCOUNTER — TELEPHONE (OUTPATIENT)
Dept: OBGYN | Facility: CLINIC | Age: 31
End: 2021-04-23

## 2021-04-23 ENCOUNTER — TRANSFERRED RECORDS (OUTPATIENT)
Dept: HEALTH INFORMATION MANAGEMENT | Facility: CLINIC | Age: 31
End: 2021-04-23

## 2021-04-23 NOTE — TELEPHONE ENCOUNTER
Associated Diagnoses    Pregnancy affected by fetal growth restriction [O36.5990]  - Primary       Oblique lie of fetus, single or unspecified fetus [O32.2XX0]       Source Order Set    Order Set Name Order ID    [id number]   Detailed Information    Priority and Order Details           Order Questions    Question Answer Comment   Procedure name(s) - multi select  Delivery    Reason for procedure Breech, fetal growth restriction    Surgeon: Lindy    Is this a multi surgeon case? No    Laterality N/A    Request for additional equipment Other (see comments) None   Anesthesia Spinal    Positioning (if different from preference card): Supine    Is the patient known to have any of the following? None of the above    Initiate Pre-op orders for above procedure: Yes, as ordered in Epic additional orders noted there also   Location of Case: [facility]    Sterilization or Hysterectomy consent signed in advance: NA    Surgeon Procedure Time (incision to closure) in minutes (per procedure as applicable) 60    Note:  Surgical Case Time Needed (in minutes)   Patient Class (for admit prior to surgery, specify number of days in comments): Surgery admit admit day of surgery   Vendor Needed? No             Surgery Scheduled    Date of Surgery 5/3 Time of Surgery 12:30     She will at least need an induction that day if she doesn't have a  per Dr. Dolan    Type of Anesthesia Anticipated: Spinal  Pre-Op: To be done at prenatal visit  Post-Op:   Dr. Lindy Loaiza 9:30  Pre-certification routed to [employer]:  Yes    Surgery packet mailed to patient's home address: Yes  Patient instructed NPO 12 hours prior to surgery, arrive 1 hr 45min(s) prior to surgery, must have a .  Patient understood and agrees to the plan.      COVID testing:  [facility] 3:00 p.m.     Surgery Pre-Certification    Medical Record Number: [id number]  Lizzie SPARKLE Ralfhardik  YOB: 1990   Phone: [phone] (home)    Primary Provider: [facility]    Reason for Admit:  Breech, fetal growth restriction    Surgeon: DIAMOND Israel DO  Surgical Procedure:  Delivery  ICD-9 Coded:   Pregnancy affected by fetal growth restriction [O36.5990]  - Primary       Oblique lie of fetus, single or unspecified fetus [O32.2XX0]           Date of Surgery: 5/3/2021  Consent signed? N/A      Hospital: [facility]  Inpatient- Length of stay:  3 days.    Requestor:  Ally Ny     Location:  [place]

## 2021-04-27 ENCOUNTER — TRANSFERRED RECORDS (OUTPATIENT)
Dept: HEALTH INFORMATION MANAGEMENT | Facility: CLINIC | Age: 31
End: 2021-04-27

## 2021-04-27 DIAGNOSIS — Z20.822 ENCOUNTER FOR LABORATORY TESTING FOR COVID-19 VIRUS: Primary | ICD-10-CM

## 2021-04-28 DIAGNOSIS — Z20.822 ENCOUNTER FOR LABORATORY TESTING FOR COVID-19 VIRUS: ICD-10-CM

## 2021-04-28 LAB
SARS-COV-2 RNA RESP QL NAA+PROBE: NORMAL
SPECIMEN SOURCE: NORMAL

## 2021-04-28 PROCEDURE — U0003 INFECTIOUS AGENT DETECTION BY NUCLEIC ACID (DNA OR RNA); SEVERE ACUTE RESPIRATORY SYNDROME CORONAVIRUS 2 (SARS-COV-2) (CORONAVIRUS DISEASE [COVID-19]), AMPLIFIED PROBE TECHNIQUE, MAKING USE OF HIGH THROUGHPUT TECHNOLOGIES AS DESCRIBED BY CMS-2020-01-R: HCPCS | Performed by: OBSTETRICS & GYNECOLOGY

## 2021-04-28 PROCEDURE — U0005 INFEC AGEN DETEC AMPLI PROBE: HCPCS | Performed by: OBSTETRICS & GYNECOLOGY

## 2021-04-29 ENCOUNTER — PRENATAL OFFICE VISIT (OUTPATIENT)
Dept: OBGYN | Facility: OTHER | Age: 31
End: 2021-04-29
Payer: COMMERCIAL

## 2021-04-29 VITALS
HEART RATE: 86 BPM | OXYGEN SATURATION: 100 % | SYSTOLIC BLOOD PRESSURE: 124 MMHG | DIASTOLIC BLOOD PRESSURE: 78 MMHG | WEIGHT: 154 LBS | BODY MASS INDEX: 28.17 KG/M2

## 2021-04-29 DIAGNOSIS — O43.193 PLACENTA SUCCENTURIATA IN THIRD TRIMESTER: ICD-10-CM

## 2021-04-29 DIAGNOSIS — O09.899 HISTORY OF PRETERM DELIVERY, CURRENTLY PREGNANT: ICD-10-CM

## 2021-04-29 DIAGNOSIS — O99.820: ICD-10-CM

## 2021-04-29 DIAGNOSIS — O36.5990 PREGNANCY AFFECTED BY FETAL GROWTH RESTRICTION: Primary | ICD-10-CM

## 2021-04-29 LAB
LABORATORY COMMENT REPORT: NORMAL
SARS-COV-2 RNA RESP QL NAA+PROBE: NEGATIVE
SPECIMEN SOURCE: NORMAL

## 2021-04-29 PROCEDURE — 99207 PR PRENATAL VISIT: CPT | Performed by: OBSTETRICS & GYNECOLOGY

## 2021-04-29 NOTE — PROGRESS NOTES
2021     NAME:  Lizzie Oconnell  PCP:  Belle Correia  MRN:  [id number]    PREOPERATIVE EXAM    Impression / Plan     Assessment:     31 year old  at 36w3d with breech presentation    Fetal growth restriction and low-normal fluid, followed by MFM    Anterior placenta with posterior accessory lob.      Group B Strep bacturia in 1st trimester.      History of  delivery     Anxiety    Body mass index is 28.17 kg/m .     Patient is at intermediate risk for the proposed surgery    Plan:      Primary  section scheduled for 37 weeks per [facility] recommendations.      Twice weekly NST, amniotic fluid assessment and UA doppler with [facility].  This is scheduled for tomorrow. They may consider putting off delivery recommendations pending US findings.  She will call me tomorrow after her ultrasound if she has concerns or if the plan has changed.     We discussed the plans for  section. She is aware of the risks, benefits, and alternatives to  delivery.  Risks include but are not limited to bleeding, infections which may require antibiotic therapy, injury maternal organs     She is cleared for surgery.  Recent Covid screening was negative    HPI     Lizzie Oconnell is a  31 old who is seen for routine prenatal care. .    Patient has no concerns today, aside from anxiety around the .  She is worried about the recovery with 4 children at home and the baby likely needing a NICU stay. She had some contractions and thought the baby flipped to vertex.  No contractions today.  She denies  leaking of amniotic fluid, and vaginal bleeding.          Problem List     Patient Active Problem List   Diagnosis     Pain in joint, multiple sites     TYREL (generalized anxiety disorder)     Mild major depression (H)     Panic attack     History of prior pregnancy with SGA      History of  delivery, currently pregnant     Migraines     Dysmenorrhea     Tetanus, diphtheria, and  acellular pertussis (Tdap) vaccination declined     Urinary tract colonization by group B Streptococcus affecting pregnancy     Anterior placenta with posterior accessory lobe     Pregnancy affected by fetal growth restriction        Medications     Current Outpatient Medications   Medication     DULoxetine (CYMBALTA) 20 MG capsule     ondansetron (ZOFRAN) 8 MG tablet     Prenatal Vit-Fe Fumarate-FA (PRENATAL MULTIVITAMIN W/IRON) 27-0.8 MG tablet     No current facility-administered medications for this visit.           Allergies     Allergies   Allergen Reactions     Aminothiols      Amoxicillin Difficulty breathing     Compazine [Prochlorperazine]      Penicillins        Medical / Surgical History     Past Medical History:   Diagnosis Date     Anxiety 03/2018     Depressive disorder      Pregnancy        Past Surgical History:   Procedure Laterality Date     CHOLECYSTECTOMY       GALLBLADDER SURGERY         Social History     Social History     Socioeconomic History     Marital status:      Spouse name: Not on file     Number of children: Not on file     Years of education: Not on file     Highest education level: Not on file   Occupational History     Not on file   Social Needs     Financial resource strain: Not on file     Food insecurity     Worry: Not on file     Inability: Not on file     Transportation needs     Medical: Not on file     Non-medical: Not on file   Tobacco Use     Smoking status: Never Smoker     Smokeless tobacco: Never Used   Substance and Sexual Activity     Alcohol use: No     Drug use: No     Sexual activity: Yes     Partners: Male     Birth control/protection: None     Comment: pregnant   Lifestyle     Physical activity     Days per week: Not on file     Minutes per session: Not on file     Stress: Not on file   Relationships     Social connections     Talks on phone: Not on file     Gets together: Not on file     Attends Mandaen service: Not on file     Active member of club or  organization: Not on file     Attends meetings of clubs or organizations: Not on file     Relationship status: Not on file     Intimate partner violence     Fear of current or ex partner: Not on file     Emotionally abused: Not on file     Physically abused: Not on file     Forced sexual activity: Not on file   Other Topics Concern     Parent/sibling w/ CABG, MI or angioplasty before 65F 55M? Not Asked   Social History Narrative     Not on file       Family History     Family History   Problem Relation Age of Onset     Anxiety Disorder Mother      Depression Mother      Anxiety Disorder Brother      Depression Brother        ROS     A 10 organ review of systems was asked and the pertinent positives and negatives are listed in the HPI. All other organ systems can be considered negative.     Physical Exam   Vitals: /78 (BP Location: Right arm, Cuff Size: Adult Regular)   Pulse 86   Wt 69.9 kg (154 lb)   LMP 08/12/2020   SpO2 100%   BMI 28.17 kg/m      General: Well developed, well nourished, pleasant. No acute distress.    Neck: Trachea midline.    Resp: Nonlabored.    Abdomen: Soft, non tender, gravid  Skin: No rashes, lesions, or subcutaneous nodules.   : deferred  Neuro:  Alert and oriented times 3.  Appropriate.    See obstetrical flowsheet for additional findings.     Labs/Imaging       Labs were reviewed in Epic    Hemoglobin   Date Value Ref Range Status   02/18/2021 11.9 11.7 - 15.7 g/dL Final   ]     Imaging was reviewed in Epic.           Nursing notes read and reviewed    Diane Dolan MD

## 2021-04-30 ENCOUNTER — TRANSFERRED RECORDS (OUTPATIENT)
Dept: HEALTH INFORMATION MANAGEMENT | Facility: CLINIC | Age: 31
End: 2021-04-30

## 2021-05-04 ENCOUNTER — TRANSFERRED RECORDS (OUTPATIENT)
Dept: HEALTH INFORMATION MANAGEMENT | Facility: CLINIC | Age: 31
End: 2021-05-04

## 2021-05-04 ENCOUNTER — TELEPHONE (OUTPATIENT)
Dept: OBGYN | Facility: OTHER | Age: 31
End: 2021-05-04

## 2021-05-04 NOTE — TELEPHONE ENCOUNTER
Surgery Scheduled    Date of Surgery 05/05/2021 Time of Surgery 9:30 am   Type of Anesthesia Anticipated: Spinal  Pre-Op:done by Dr. Dolan  Post-Op:  6 weeks will call to schedule   Pre-certification routed to Financial Counselors:  Yes    Surgery packet mailed to patient's home address: No  Patient instructed NPO 12 hours prior to surgery, arrive 1.5 hour(s) prior to surgery, must have a .  Patient understood and agrees to the plan.      COVID testing:  had testing done 04/28  Surgery Pre-Certification    Medical Record Number: [id number]  Lizzie Oconnell  YOB: 1990   Phone: [phone] (home)   Primary Provider: Belle Correia    Reason for Admit:  Previous C/S    Surgeon: SULAIMAN Dolan MD  Surgical Procedure: repeat C/S  ICD-9 Coded: 034.219  Date of Surgery: 05/05/2021  Consent signed? N/A      Hospital: [facility]  Inpatient- Length of stay:  3 days.    Requestor:  Renee Quinones     Location:

## 2021-05-04 NOTE — TELEPHONE ENCOUNTER
PB DOS: 5/5/21 [facility] waiting on codes  Type of Procedure: c section  CPT Codes: 81015, 51058  ICD10 Codes: o36.5990  Surgeon/Ordering provider: dimple  Pre-cert/Authorization completed:  No pa required per [employer] grid on site  Payer: [employer]  Date Checked:   Spoke to   Ref. # / Auth #   Valid Dates:

## 2021-05-09 ENCOUNTER — NURSE TRIAGE (OUTPATIENT)
Dept: NURSING | Facility: CLINIC | Age: 31
End: 2021-05-09

## 2021-05-09 DIAGNOSIS — G89.18 POSTOPERATIVE PAIN: Primary | ICD-10-CM

## 2021-05-09 RX ORDER — OXYCODONE HYDROCHLORIDE 5 MG/1
5 TABLET ORAL EVERY 4 HOURS PRN
Qty: 12 TABLET | Refills: 0 | Status: SHIPPED | OUTPATIENT
Start: 2021-05-09 | End: 2021-05-12

## 2021-05-09 NOTE — TELEPHONE ENCOUNTER
"FNA triage call :  OB- follows Dr Diane Dolan / delivered at [place] .   Presenting problem : Pt called - for refill of oxycodone- uses [facility] in [place] phone  post C section 5/5/21 . Discharge yesterday . Currently : 1&0 & eat are ok, but pain is out of control and running out of oxycodone on 4pm today  And need pain Rx to get up an take care of herself , took oxy at 9 and pain is at 5/10 now and  taking 1 every 4 hours .  Pt has 5 kids . Pt denies any other problems or symptoms.    Guideline used : Medication question Call A .   Disposition and recommendations : Conferenced to [place] [facility] and they recommended  Paging  Dr Dale Angela from  [phone]  to [facility] , which was done  @ 937 am to [facility] ph #  [phone].  Smart web repaged at 949 AM . Dr Angela called and  wanted [facility] to call Pt ( which was done )  to advise that he will be calling in Rx for Pt to her preferred pharmacy and she can contact them in 1-2 hours and see when Rx will be ready for    Caller verbalizes understanding and denies further questions and will call back if further symptoms to triage or questions  . Nanda Lee RN  - [place] Nurse Advisor     Reason for Disposition    [1] Request for URGENT new prescription or refill of \"essential\" medication (i.e., likelihood of harm to patient if not taken) AND [2] triager unable to fill per unit policy    Additional Information    Negative: Drug overdose and triager unable to answer question    Negative: Caller requesting information unrelated to medicine    Negative: Caller requesting a prescription for Strep throat and has a positive culture result    Negative: Rash while taking a medication or within 3 days of stopping it    Negative: Immunization reaction suspected    Negative: [1] Asthma and [2] having symptoms of asthma (cough, wheezing, etc.)    Negative: [1] Influenza symptoms AND [2] anti-viral med prescription request, such as Tamiflu    Negative: [1] Symptom " of illness (e.g., headache, abdominal pain, earache, vomiting) AND [2] more than mild    Negative: MORE THAN A DOUBLE DOSE of a prescription or over-the-counter (OTC) drug    Negative: [1] DOUBLE DOSE (an extra dose or lesser amount) of over-the-counter (OTC) drug AND [2] any symptoms (e.g., dizziness, nausea, pain, sleepiness)    Negative: [1] DOUBLE DOSE (an extra dose or lesser amount) of prescription drug AND [2] any symptoms (e.g., dizziness, nausea, pain, sleepiness)    Negative: Took another person's prescription drug    Negative: [1] DOUBLE DOSE (an extra dose or lesser amount) of prescription drug AND [2] NO symptoms (Exception: a double dose of antibiotics)    Negative: Diabetes drug error or overdose (e.g., took wrong type of insulin or took extra dose)    Protocols used: MEDICATION QUESTION CALL-A-

## 2021-05-12 ENCOUNTER — NURSE TRIAGE (OUTPATIENT)
Dept: NURSING | Facility: CLINIC | Age: 31
End: 2021-05-12

## 2021-05-13 NOTE — TELEPHONE ENCOUNTER
Patient reports she had a  on the . Patient reports she had been given acetaminophen 1-2 tabs q 4 hrs in the hospital so she continued to take it every four hours.    Patient reports she just realized that for the past 4 days, she has been taking about 5000 mg daily. Today she took them at   2:30 am, 6:30am, 10:30 am, 2:30 and 7 pm. Patient reports she started having nausea since yesterday evening.    Reviewed care advice with caller per RN triage protocol guideline with disposition of call Poison Control now. Advised to call clinic to report what Poison Control recommended. Caller verbalized understanding.        Reason for Disposition    [1] DOUBLE DOSE (an extra dose or lesser amount) of over-the-counter (OTC) drug AND [2] any symptoms (e.g., dizziness, nausea, pain, sleepiness)    Additional Information    Negative: MORE THAN A DOUBLE DOSE of a prescription or over-the-counter (OTC) drug    Protocols used: MEDICATION QUESTION CALL-A-

## 2021-05-13 NOTE — TELEPHONE ENCOUNTER
RN notes this message was sent as FYI. Pt advised to call poison control per previous triage recommendation.    RN closing encounter.    Marva Ragland RN on 5/13/2021 at 8:56 AM

## 2021-06-10 ENCOUNTER — MEDICAL CORRESPONDENCE (OUTPATIENT)
Dept: HEALTH INFORMATION MANAGEMENT | Facility: CLINIC | Age: 31
End: 2021-06-10

## 2021-06-17 ENCOUNTER — PRENATAL OFFICE VISIT (OUTPATIENT)
Dept: OBGYN | Facility: OTHER | Age: 31
End: 2021-06-17
Payer: COMMERCIAL

## 2021-06-17 VITALS
BODY MASS INDEX: 26.06 KG/M2 | DIASTOLIC BLOOD PRESSURE: 78 MMHG | WEIGHT: 142.5 LBS | SYSTOLIC BLOOD PRESSURE: 110 MMHG | HEART RATE: 68 BPM | OXYGEN SATURATION: 100 %

## 2021-06-17 PROBLEM — O09.899 HISTORY OF PRETERM DELIVERY, CURRENTLY PREGNANT: Status: RESOLVED | Noted: 2018-08-09 | Resolved: 2021-06-17

## 2021-06-17 PROBLEM — O43.199 ACCESSORY PLACENTA: Status: RESOLVED | Noted: 2021-02-19 | Resolved: 2021-06-17

## 2021-06-17 PROBLEM — O99.820 URINARY TRACT COLONIZATION BY GROUP B STREPTOCOCCUS AFFECTING PREGNANCY: Status: RESOLVED | Noted: 2020-11-11 | Resolved: 2021-06-17

## 2021-06-17 PROBLEM — O36.5990 PREGNANCY AFFECTED BY FETAL GROWTH RESTRICTION: Status: RESOLVED | Noted: 2021-02-19 | Resolved: 2021-06-17

## 2021-06-17 PROCEDURE — 99207 PR POST PARTUM EXAM: CPT | Performed by: OBSTETRICS & GYNECOLOGY

## 2021-06-17 ASSESSMENT — PATIENT HEALTH QUESTIONNAIRE - PHQ9
SUM OF ALL RESPONSES TO PHQ QUESTIONS 1-9: 0
10. IF YOU CHECKED OFF ANY PROBLEMS, HOW DIFFICULT HAVE THESE PROBLEMS MADE IT FOR YOU TO DO YOUR WORK, TAKE CARE OF THINGS AT HOME, OR GET ALONG WITH OTHER PEOPLE: NOT DIFFICULT AT ALL
SUM OF ALL RESPONSES TO PHQ QUESTIONS 1-9: 0

## 2021-06-17 NOTE — PROGRESS NOTES
SUBJECTIVE:  31 year old  here for a 6-week postpartum checkup.         OB History    Para Term  AB Living   6 5 4 1 1 5   SAB TAB Ectopic Multiple Live Births   1 0 0 0 5      # Outcome Date GA Lbr Chu/2nd Weight Sex Delivery Anes PTL Lv   6 Term 21 37w2d  2.2 kg (4 lb 13.6 oz) F CS-Unspec Spinal N DESIREE      Complications: Breech presentation, IUGR (intrauterine growth restriction) affecting care of mother      Name: KRYS CORMIER      Apgar1: 8  Apgar5: 9   5 Term 20 38w0d 14:29 / 00:05 2.17 kg (4 lb 12.5 oz) M Vag-Spont None N DESIREE      Name: SUKH CORMIER      Apgar1: 8  Apgar5: 9   4 Term 19 38w0d 12:45 / 00:06 2.82 kg (6 lb 3.5 oz) M Vag-Spont EPI N DESIREE      Name: SUKH CORMIER      Apgar1: 8  Apgar5: 9   3  16 34w0d  2.126 kg (4 lb 11 oz) M Vag-Spont   DESIREE      Complications:  premature rupture of membranes (PPROM) with onset of labor after 24 hours of rupture in third trimester, antepartum      Name: SUKH CORMIER   2 SAB 2015           1 Term 13 39w0d  2.49 kg (5 lb 7.8 oz) F Vag-Spont EPI N DESIREE      Complications: SGA (small for gestational age)      Name: GENIAGIRL      Apgar1: 8  Apgar5: 9       Complications: None     Since delivery, she has not been breast feeding.  She has no bleeding, abnormal discharge or pain.  Patient screened for postpartum depression. Has good support system in place.    Lab Results   Component Value Date    PAP NIL 2019         EXAM:  /78 (BP Location: Right arm, Cuff Size: Adult Regular)   Pulse 68   Wt 64.6 kg (142 lb 8 oz)   LMP 2021 (Exact Date)   SpO2 100%   Breastfeeding No   BMI 26.06 kg/m       General: alert, healthy appearing woman  Abdomen: soft, nontender, no palpable masses  Incision:  C/d/i   Pelvic exam:   Deferred    ASSESSMENT:  Normal postpartum exam    PLAN:  1. Contraceptive options reviewed; patient wishes to utilize: vasectomy.  She does not need  contraception in the interim.    2. Continue with annual health maintenance exams.       Diane Dolan MD     Answers for HPI/ROS submitted by the patient on 6/17/2021   If you checked off any problems, how difficult have these problems made it for you to do your work, take care of things at home, or get along with other people?: Not difficult at all  PHQ9 TOTAL SCORE: 0

## 2021-08-20 NOTE — TELEPHONE ENCOUNTER
Attempted call to Sanjay at [phone] no answer, voicemail left to return call for an update.    Called [facility], the representative stated that the Prior Auth is approved for Hydroxyprogesterone 250 mg and she ran a test claim with [facility]'s information and was able to get a paid claim. I will fax the approval letter to [facility].

## 2021-09-02 ENCOUNTER — TELEPHONE (OUTPATIENT)
Dept: FAMILY MEDICINE | Facility: CLINIC | Age: 31
End: 2021-09-02

## 2021-09-02 NOTE — TELEPHONE ENCOUNTER
Left Voicemail and sent mychart to reschedule pt. Please help patient reschedule the canceled appt below:       - Provider:  Belle Correai           - Canceled appt details         Date: 09/10/2021         Time:  4:00         Type of visit:  Short      - Reason for change/cancellation: Provider will be in [place] that day       Notes to consider when rescheduling:  Patient can see provider at [facility] or reschedule for another day at [place].       Please close encounter once the patient has been rescheduled

## 2021-09-03 NOTE — TELEPHONE ENCOUNTER
Next 5 appointments (look out 90 days)    Sep 10, 2021  4:20 PM  SHORT with DENNYS Camargo CNP  [facility] ([facility] [address] [place] [id number] [phone]

## 2021-09-07 ENCOUNTER — MYC MEDICAL ADVICE (OUTPATIENT)
Dept: FAMILY MEDICINE | Facility: CLINIC | Age: 31
End: 2021-09-07

## 2021-09-07 DIAGNOSIS — O21.0 HYPEREMESIS GRAVIDARUM: ICD-10-CM

## 2021-09-08 RX ORDER — ONDANSETRON 8 MG/1
8 TABLET, FILM COATED ORAL EVERY 8 HOURS PRN
Qty: 30 TABLET | Refills: 1 | OUTPATIENT
Start: 2021-09-08

## 2021-09-08 NOTE — TELEPHONE ENCOUNTER
I have never prescribed this medication for this patient.  Also I have not seen patient in over a year patient would need office visit prior to any refill or prescription.    Belle Correia CNP

## 2021-09-08 NOTE — TELEPHONE ENCOUNTER
Routing refill request to provider for review/approval because:  Medication is reported/historical    Lynne Barba RN on 9/8/2021 at 10:30 AM

## 2021-09-09 ENCOUNTER — MYC MEDICAL ADVICE (OUTPATIENT)
Dept: FAMILY MEDICINE | Facility: CLINIC | Age: 31
End: 2021-09-09

## 2021-09-10 ENCOUNTER — OFFICE VISIT (OUTPATIENT)
Dept: FAMILY MEDICINE | Facility: CLINIC | Age: 31
End: 2021-09-10
Payer: COMMERCIAL

## 2021-09-10 VITALS
SYSTOLIC BLOOD PRESSURE: 112 MMHG | BODY MASS INDEX: 24.77 KG/M2 | DIASTOLIC BLOOD PRESSURE: 68 MMHG | RESPIRATION RATE: 18 BRPM | WEIGHT: 139.8 LBS | HEART RATE: 76 BPM | HEIGHT: 63 IN | TEMPERATURE: 98.2 F

## 2021-09-10 DIAGNOSIS — F41.1 GAD (GENERALIZED ANXIETY DISORDER): Primary | ICD-10-CM

## 2021-09-10 DIAGNOSIS — F32.0 MILD MAJOR DEPRESSION (H): ICD-10-CM

## 2021-09-10 PROCEDURE — 99214 OFFICE O/P EST MOD 30 MIN: CPT | Performed by: NURSE PRACTITIONER

## 2021-09-10 RX ORDER — DULOXETIN HYDROCHLORIDE 20 MG/1
40 CAPSULE, DELAYED RELEASE ORAL 2 TIMES DAILY
Qty: 60 CAPSULE | Refills: 5 | Status: SHIPPED | OUTPATIENT
Start: 2021-09-10 | End: 2021-09-22

## 2021-09-10 ASSESSMENT — PATIENT HEALTH QUESTIONNAIRE - PHQ9
SUM OF ALL RESPONSES TO PHQ QUESTIONS 1-9: 10
5. POOR APPETITE OR OVEREATING: NEARLY EVERY DAY

## 2021-09-10 ASSESSMENT — MIFFLIN-ST. JEOR: SCORE: 1320.32

## 2021-09-10 ASSESSMENT — ANXIETY QUESTIONNAIRES
2. NOT BEING ABLE TO STOP OR CONTROL WORRYING: NEARLY EVERY DAY
5. BEING SO RESTLESS THAT IT IS HARD TO SIT STILL: NEARLY EVERY DAY
GAD7 TOTAL SCORE: 20
1. FEELING NERVOUS, ANXIOUS, OR ON EDGE: NEARLY EVERY DAY
IF YOU CHECKED OFF ANY PROBLEMS ON THIS QUESTIONNAIRE, HOW DIFFICULT HAVE THESE PROBLEMS MADE IT FOR YOU TO DO YOUR WORK, TAKE CARE OF THINGS AT HOME, OR GET ALONG WITH OTHER PEOPLE: VERY DIFFICULT
3. WORRYING TOO MUCH ABOUT DIFFERENT THINGS: NEARLY EVERY DAY
6. BECOMING EASILY ANNOYED OR IRRITABLE: NEARLY EVERY DAY
7. FEELING AFRAID AS IF SOMETHING AWFUL MIGHT HAPPEN: MORE THAN HALF THE DAYS

## 2021-09-10 ASSESSMENT — PAIN SCALES - GENERAL: PAINLEVEL: NO PAIN (0)

## 2021-09-10 NOTE — PROGRESS NOTES
"    Assessment & Plan     TYREL (generalized anxiety disorder)  Recommend counseling for patient discussed increased success rates with medication and counseling she states she has never been through counseling and is very interested in this.  We will increase duloxetine from 20 mg daily to 40 this is still a pretty low dose may need to go up a little bit further she will slowly increase dosing over the next several weeks.  - MENTAL HEALTH REFERRAL  - Adult; Outpatient Treatment; Individual/Couples/Family/Group Therapy/Health Psychology; [facility] [phone]; We will contact you to schedule the appointment or please call with any questions; Future  - DULoxetine (CYMBALTA) 20 MG capsule; Take 2 capsules (40 mg) by mouth 2 times daily    Mild major depression (H)  Home care instructions were reviewed with the patient. The risks, benefits and treatment options of prescribed medications or other treatments have been discussed with the patient. The patient verbalized their understanding and should call or follow up if no improvement or if they develop further problems.    - MENTAL HEALTH REFERRAL  - Adult; Outpatient Treatment; Individual/Couples/Family/Group Therapy/Health Psychology; [facility] [phone]; We will contact you to schedule the appointment or please call with any questions; Future  - DULoxetine (CYMBALTA) 20 MG capsule; Take 2 capsules (40 mg) by mouth 2 times daily             BMI:   Estimated body mass index is 25.16 kg/m  as calculated from the following:    Height as of this encounter: 1.588 m (5' 2.5\").    Weight as of this encounter: 63.4 kg (139 lb 12.8 oz).   Weight management plan: Discussed healthy diet and exercise guidelines    Patient Instructions   Please follow up in 6 weeks for mood.     Thank you   Belle oCrreia CNP        Return in about 6 weeks (around 10/22/2021), or if symptoms worsen or fail to improve, for acute illness symptoms.    Belle SHAH" Sabinske, APRN CNP  [facility] is a 31 year old who presents for the following health issues     HPI     Depression and Anxiety Follow-Up    How are you doing with your depression since your last visit? Worsened     How are you doing with your anxiety since your last visit?  Worsened     Are you having other symptoms that might be associated with depression or anxiety? Yes:  Eye and cheek twitching, hair loss    Have you had a significant life event? OTHER: birth of a child     Do you have any concerns with your use of alcohol or other drugs? No    Patient with history of depression and anxiety states that since birth of her child and shortly prior to birth of her child symptoms have been getting somewhat worse she has been sleeping well.  Having thoughts of impending doom anxiety and irritability denies thoughts of suicide or self-harm.    Social History     Tobacco Use     Smoking status: Never Smoker     Smokeless tobacco: Never Used   Substance Use Topics     Alcohol use: No     Drug use: No     PHQ 4/22/2021 6/17/2021 9/10/2021   PHQ-9 Total Score 2 0 10   Q9: Thoughts of better off dead/self-harm past 2 weeks Not at all Not at all Not at all     TYREL-7 SCORE 7/9/2018 7/24/2019 9/10/2021   Total Score - 4 (minimal anxiety) -   Total Score 0 4 20     Last PHQ-9 9/10/2021   1.  Little interest or pleasure in doing things 0   2.  Feeling down, depressed, or hopeless 2   3.  Trouble falling or staying asleep, or sleeping too much 3   4.  Feeling tired or having little energy 3   5.  Poor appetite or overeating 0   6.  Feeling bad about yourself 1   7.  Trouble concentrating 1   8.  Moving slowly or restless 0   Q9: Thoughts of better off dead/self-harm past 2 weeks 0   PHQ-9 Total Score 10   Difficulty at work, home, or with people Very difficult     TYREL-7  9/10/2021   1. Feeling nervous, anxious, or on edge 3   2. Not being able to stop or control worrying 3   3.  "Worrying too much about different things 3   4. Trouble relaxing 3   5. Being so restless that it is hard to sit still 3   6. Becoming easily annoyed or irritable 3   7. Feeling afraid, as if something awful might happen 2   TYREL-7 Total Score 20   If you checked any problems, how difficult have they made it for you to do your work, take care of things at home, or get along with other people? Very difficult       Suicide Assessment Five-step Evaluation and Treatment (SAFE-T)      How many servings of fruits and vegetables do you eat daily?  2-3    On average, how many sweetened beverages do you drink each day (Examples: soda, juice, sweet tea, etc.  Do NOT count diet or artificially sweetened beverages)?   0    How many days per week do you exercise enough to make your heart beat faster? 3 or less    How many minutes a day do you exercise enough to make your heart beat faster? 30 - 60    How many days per week do you miss taking your medication? 0        Review of Systems   Constitutional, HEENT, cardiovascular, pulmonary, GI, , musculoskeletal, neuro, skin, endocrine and psych systems are negative, except as otherwise noted.      Objective    /68 (BP Location: Right arm, Patient Position: Chair, Cuff Size: Adult Large)   Pulse 76   Temp 98.2  F (36.8  C) (Temporal)   Resp 18   Ht 1.588 m (5' 2.5\")   Wt 63.4 kg (139 lb 12.8 oz)   BMI 25.16 kg/m    Body mass index is 25.16 kg/m .  Physical Exam   GENERAL: healthy, alert and no distress  NECK: no adenopathy, no asymmetry, masses, or scars and thyroid normal to palpation  RESP: lungs clear to auscultation - no rales, rhonchi or wheezes  CV: regular rate and rhythm, normal S1 S2, no S3 or S4, no murmur, click or rub, no peripheral edema and peripheral pulses strong  MS: no gross musculoskeletal defects noted, no edema  SKIN: no suspicious lesions or rashes  NEURO: Normal strength and tone, mentation intact and speech normal  PSYCH: mentation appears normal, " affect normal/bright

## 2021-09-10 NOTE — NURSING NOTE
Health Maintenance Due   Topic Date Due     ANNUAL REVIEW OF HM ORDERS  Never done     ADVANCE CARE PLANNING  Never done     COVID-19 Vaccine (1) Never done     HEPATITIS B IMMUNIZATION (2 of 3 - 3-dose primary series) 09/03/2004     HEPATITIS C SCREENING  Never done     DTAP/TDAP/TD IMMUNIZATION (4 - Tdap) 01/08/2017     INFLUENZA VACCINE (1) Never done     Rach PIÑA LPN  Prior to immunization administration, verified patients identity using patient s name and date of birth. Please see Immunization Activity for additional information.     Screening Questionnaire for Adult Immunization    Are you sick today?   No   Do you have allergies to medications, food, a vaccine component or latex?   Yes   Have you ever had a serious reaction after receiving a vaccination?   Yes, MMR   Do you have a long-term health problem with heart, lung, kidney, or metabolic disease (e.g., diabetes), asthma, a blood disorder, no spleen, complement component deficiency, a cochlear implant, or a spinal fluid leak?  Are you on long-term aspirin therapy?   No   Do you have cancer, leukemia, HIV/AIDS, or any other immune system problem?   No   Do you have a parent, brother, or sister with an immune system problem?   No   In the past 3 months, have you taken medications that affect  your immune system, such as prednisone, other steroids, or anticancer drugs; drugs for the treatment of rheumatoid arthritis, Crohn s disease, or psoriasis; or have you had radiation treatments?   No   Have you had a seizure, or a brain or other nervous system problem?   No   During the past year, have you received a transfusion of blood or blood    products, or been given immune (gamma) globulin or antiviral drug?   No   For women: Are you pregnant or is there a chance you could become       pregnant during the next month?   No   Have you received any vaccinations in the past 4 weeks?   No     Immunization questionnaire was positive for at least one answer.  Notified  Belle Correia CNP.        Per orders of  , injection of  given by Rach Luong LPN. Patient instructed to remain in clinic for 15 minutes afterwards, and to report any adverse reaction to me immediately.       Screening performed by Rach Luong LPN on 9/10/2021 at 4:39 PM.

## 2021-09-11 ASSESSMENT — ANXIETY QUESTIONNAIRES: GAD7 TOTAL SCORE: 20

## 2021-09-12 ENCOUNTER — HEALTH MAINTENANCE LETTER (OUTPATIENT)
Age: 31
End: 2021-09-12

## 2021-09-21 ENCOUNTER — MYC MEDICAL ADVICE (OUTPATIENT)
Dept: FAMILY MEDICINE | Facility: CLINIC | Age: 31
End: 2021-09-21

## 2021-09-22 DIAGNOSIS — F41.1 GAD (GENERALIZED ANXIETY DISORDER): ICD-10-CM

## 2021-09-22 DIAGNOSIS — F32.0 MILD MAJOR DEPRESSION (H): ICD-10-CM

## 2021-09-22 RX ORDER — DULOXETIN HYDROCHLORIDE 20 MG/1
40 CAPSULE, DELAYED RELEASE ORAL DAILY
Qty: 60 CAPSULE | Refills: 5 | Status: SHIPPED | OUTPATIENT
Start: 2021-09-22 | End: 2021-10-11

## 2021-10-09 ENCOUNTER — MYC MEDICAL ADVICE (OUTPATIENT)
Dept: FAMILY MEDICINE | Facility: CLINIC | Age: 31
End: 2021-10-09

## 2021-10-09 DIAGNOSIS — F32.0 MILD MAJOR DEPRESSION (H): ICD-10-CM

## 2021-10-09 DIAGNOSIS — F41.1 GAD (GENERALIZED ANXIETY DISORDER): ICD-10-CM

## 2021-10-11 ENCOUNTER — MYC MEDICAL ADVICE (OUTPATIENT)
Dept: FAMILY MEDICINE | Facility: CLINIC | Age: 31
End: 2021-10-11

## 2021-10-11 RX ORDER — DULOXETIN HYDROCHLORIDE 20 MG/1
60 CAPSULE, DELAYED RELEASE ORAL DAILY
Qty: 60 CAPSULE | Refills: 5 | Status: SHIPPED | OUTPATIENT
Start: 2021-10-11 | End: 2021-12-08 | Stop reason: DRUGHIGH

## 2021-10-19 PROBLEM — F32.9 MAJOR DEPRESSION: Status: ACTIVE | Noted: 2018-02-28

## 2021-11-06 ENCOUNTER — MYC MEDICAL ADVICE (OUTPATIENT)
Dept: FAMILY MEDICINE | Facility: CLINIC | Age: 31
End: 2021-11-06
Payer: COMMERCIAL

## 2021-11-06 DIAGNOSIS — F32.0 MILD MAJOR DEPRESSION (H): ICD-10-CM

## 2021-11-06 DIAGNOSIS — F41.1 GAD (GENERALIZED ANXIETY DISORDER): ICD-10-CM

## 2021-11-08 ENCOUNTER — MYC MEDICAL ADVICE (OUTPATIENT)
Dept: FAMILY MEDICINE | Facility: CLINIC | Age: 31
End: 2021-11-08
Payer: COMMERCIAL

## 2021-11-08 RX ORDER — DULOXETIN HYDROCHLORIDE 20 MG/1
60 CAPSULE, DELAYED RELEASE ORAL DAILY
Qty: 60 CAPSULE | Refills: 5 | OUTPATIENT
Start: 2021-11-08

## 2021-11-08 NOTE — TELEPHONE ENCOUNTER
Refilled 10/11/21 for #60 with 5 refills. Not needed at this time.  Kaylee Apodaca RN on 11/8/2021 at 1:55 PM

## 2021-12-08 ENCOUNTER — VIRTUAL VISIT (OUTPATIENT)
Dept: FAMILY MEDICINE | Facility: CLINIC | Age: 31
End: 2021-12-08
Payer: COMMERCIAL

## 2021-12-08 DIAGNOSIS — F41.0 PANIC ATTACK: ICD-10-CM

## 2021-12-08 DIAGNOSIS — F41.1 GAD (GENERALIZED ANXIETY DISORDER): Primary | ICD-10-CM

## 2021-12-08 DIAGNOSIS — F33.1 MODERATE EPISODE OF RECURRENT MAJOR DEPRESSIVE DISORDER (H): ICD-10-CM

## 2021-12-08 PROCEDURE — 99214 OFFICE O/P EST MOD 30 MIN: CPT | Mod: 95 | Performed by: NURSE PRACTITIONER

## 2021-12-08 RX ORDER — ALPRAZOLAM 0.25 MG
0.25 TABLET ORAL 3 TIMES DAILY PRN
Qty: 10 TABLET | Refills: 0 | Status: SHIPPED | OUTPATIENT
Start: 2021-12-08 | End: 2022-01-25

## 2021-12-08 RX ORDER — DULOXETIN HYDROCHLORIDE 20 MG/1
40 CAPSULE, DELAYED RELEASE ORAL 2 TIMES DAILY
Qty: 60 CAPSULE | Refills: 3 | Status: SHIPPED | OUTPATIENT
Start: 2021-12-08 | End: 2022-01-25 | Stop reason: DRUGHIGH

## 2021-12-08 ASSESSMENT — PATIENT HEALTH QUESTIONNAIRE - PHQ9
SUM OF ALL RESPONSES TO PHQ QUESTIONS 1-9: 22
SUM OF ALL RESPONSES TO PHQ QUESTIONS 1-9: 22
10. IF YOU CHECKED OFF ANY PROBLEMS, HOW DIFFICULT HAVE THESE PROBLEMS MADE IT FOR YOU TO DO YOUR WORK, TAKE CARE OF THINGS AT HOME, OR GET ALONG WITH OTHER PEOPLE: EXTREMELY DIFFICULT

## 2021-12-08 ASSESSMENT — ANXIETY QUESTIONNAIRES
7. FEELING AFRAID AS IF SOMETHING AWFUL MIGHT HAPPEN: NEARLY EVERY DAY
5. BEING SO RESTLESS THAT IT IS HARD TO SIT STILL: NEARLY EVERY DAY
GAD7 TOTAL SCORE: 21
7. FEELING AFRAID AS IF SOMETHING AWFUL MIGHT HAPPEN: NEARLY EVERY DAY
4. TROUBLE RELAXING: NEARLY EVERY DAY
1. FEELING NERVOUS, ANXIOUS, OR ON EDGE: NEARLY EVERY DAY
6. BECOMING EASILY ANNOYED OR IRRITABLE: NEARLY EVERY DAY
GAD7 TOTAL SCORE: 21
2. NOT BEING ABLE TO STOP OR CONTROL WORRYING: NEARLY EVERY DAY
3. WORRYING TOO MUCH ABOUT DIFFERENT THINGS: NEARLY EVERY DAY
GAD7 TOTAL SCORE: 21

## 2021-12-08 NOTE — PATIENT INSTRUCTIONS
Please continue Cymbalta 40 mg as discussed you may use Xanax as needed for panic attacks.  Please continue therapy cognitive behavioral health technique is recommended.  Please follow-up in 4 weeks    Thank you

## 2021-12-08 NOTE — PROGRESS NOTES
Lizzie is a 31 year old who is being evaluated via a billable video visit.      How would you like to obtain your AVS? MyChart  If the video visit is dropped, the invitation should be resent by: Text to cell phone: [phone]  Will anyone else be joining your video visit? No      Video Start Time: 1645    Assessment & Plan     TYREL (generalized anxiety disorder)  Dosing adjustment made patient will continue at 40 mg daily she did not tolerate higher dosing of this medication.  With worsening symptoms of suicidal ideation.  Discussed continuation of therapy with incorporation of cognitive behavioral health techniques.  If there is going to be a medication that needed to be changed in the future would recommend weaning off Cymbalta possibly starting Effexor.  We will also continue Xanax sparingly for panic attacks.  - DULoxetine (CYMBALTA) 20 MG capsule; Take 2 capsules (40 mg) by mouth 2 times daily    Moderate episode of recurrent major depressive disorder (H)    - DULoxetine (CYMBALTA) 20 MG capsule; Take 2 capsules (40 mg) by mouth 2 times daily    Panic attack  Side effects of medications, proper use, the associated risk/benefits and other options were discussed. Patient understands these risks and agrees to take the medication as instructed.     - ALPRAZolam (XANAX) 0.25 MG tablet; Take 1 tablet (0.25 mg) by mouth 3 times daily as needed for anxiety         Depression Screening Follow Up    PHQ 12/8/2021   PHQ-9 Total Score 22   Q9: Thoughts of better off dead/self-harm past 2 weeks Several days   F/U: Thoughts of suicide or self-harm No   F/U: Safety concerns No     Last PHQ-9 12/8/2021   1.  Little interest or pleasure in doing things 3   2.  Feeling down, depressed, or hopeless 3   3.  Trouble falling or staying asleep, or sleeping too much 3   4.  Feeling tired or having little energy 3   5.  Poor appetite or overeating 3   6.  Feeling bad about yourself 3   7.  Trouble concentrating 3   8.  Moving slowly or  restless 0   Q9: Thoughts of better off dead/self-harm past 2 weeks 1   PHQ-9 Total Score 22   Difficulty at work, home, or with people -   In the past two weeks have you had thoughts of suicide or self harm? No   Do you have concerns about your personal safety or the safety of others? No           Follow Up      Follow Up Actions Taken  Crisis resource information provided in the After Visit Summary    Discussed the following ways the patient can remain in a safe environment:  be around others      No follow-ups on file.    [place] [facility] CHARLIE Samuel is a 31 year old who presents for the following health issues     History of Present Illness       Mental Health Follow-up:  Patient presents to follow-up on Depression & Anxiety.Patient's depression since last visit has been:  Medium  The patient is having other symptoms associated with depression.  Patient's anxiety since last visit has been:  Bad  The patient is having other symptoms associated with anxiety.  Any significant life events: other  Patient is feeling anxious or having panic attacks.  Patient has no concerns about alcohol or drug use.     Social History  Tobacco Use    Smoking status: Never Smoker    Smokeless tobacco: Never Used  Alcohol use: No  Drug use: No      Today's PHQ-9         PHQ-9 Total Score:     (P) 22   PHQ-9 Q9 Thoughts of better off dead/self-harm past 2 weeks :   (P) Several days   Thoughts of suicide or self harm:  (P) No   Self-harm Plan:        Self-harm Action:          Safety concerns for self or others: (P) No         She eats 2-3 servings of fruits and vegetables daily.She consumes 1 sweetened beverage(s) daily.She exercises with enough effort to increase her heart rate 30 to 60 minutes per day.  She exercises with enough effort to increase her heart rate 3 or less days per week.   She is taking medications regularly.     PHQ 6/17/2021 9/10/2021 12/8/2021   PHQ-9 Total  Score 0 10 22   Q9: Thoughts of better off dead/self-harm past 2 weeks Not at all Not at all Several days   F/U: Thoughts of suicide or self-harm - - No   F/U: Safety concerns - - No     TYREL-7 SCORE 7/24/2019 9/10/2021 12/8/2021   Total Score 4 (minimal anxiety) - 21 (severe anxiety)   Total Score 4 20 21           Social History     Tobacco Use     Smoking status: Never Smoker     Smokeless tobacco: Never Used   Substance Use Topics     Alcohol use: No     Drug use: No     PHQ 6/17/2021 9/10/2021 12/8/2021   PHQ-9 Total Score 0 10 22   Q9: Thoughts of better off dead/self-harm past 2 weeks Not at all Not at all Several days   F/U: Thoughts of suicide or self-harm - - No   F/U: Safety concerns - - No     TYREL-7 SCORE 7/24/2019 9/10/2021 12/8/2021   Total Score 4 (minimal anxiety) - 21 (severe anxiety)   Total Score 4 20 21     Last PHQ-9 12/8/2021   1.  Little interest or pleasure in doing things 3   2.  Feeling down, depressed, or hopeless 3   3.  Trouble falling or staying asleep, or sleeping too much 3   4.  Feeling tired or having little energy 3   5.  Poor appetite or overeating 3   6.  Feeling bad about yourself 3   7.  Trouble concentrating 3   8.  Moving slowly or restless 0   Q9: Thoughts of better off dead/self-harm past 2 weeks 1   PHQ-9 Total Score 22   Difficulty at work, home, or with people -   In the past two weeks have you had thoughts of suicide or self harm? No   Do you have concerns about your personal safety or the safety of others? No         Follow Up Actions Taken  Crisis resource information provided in the After Visit Summary  Scheduled appointment with [facility]     Discussed the following ways the patient can remain in a safe environment:  be around others  Suicide Assessment Five-step Evaluation and Treatment (SAFE-T)      Patient with a history of anxiety and depression she has been doing well with Cymbalta until increase in dosing caused side effects to worsen her symptoms.  She did decrease  back down to 40 mg from 60 mg of Cymbalta she now states that she is tolerating this dose well she feels that she is safe in her environment she denies thoughts of suicide self-harm she denies homicidal or hallucination.  She did start to see a therapist she has had 1 encounter so far her therapist states that she feels she has OCD.  Patient states that she has been having more panic attacks she had not had these in years since starting Cymbalta approximately 3 years ago.  States that usually occur in public when she is shopping lights will get much brighter she will see spots become frantic thinking she needs to get out of that situation she has used Xanax 0.25 mg which has helped.    She has used this medication very infrequently her last prescription was 3 years ago and she just finished the 10 tablets that she had at that time.  She states that this medication does help her calm down and gives her relief from panic attack.    We discussed continuing Cymbalta at 40 mg dosing and to start CBT treatment through her behavioral health therapist.  We will give her a refill of Xanax as needed to use for panic attacks.  And she will follow-up in 4 weeks.      Review of Systems   Constitutional, HEENT, cardiovascular, pulmonary, GI, , musculoskeletal, neuro, skin, endocrine and psych systems are negative, except as otherwise noted.      Objective           Vitals:  No vitals were obtained today due to virtual visit.    Physical Exam   GENERAL: Healthy, alert and no distress  EYES: Eyes grossly normal to inspection.  No discharge or erythema, or obvious scleral/conjunctival abnormalities.  RESP: No audible wheeze, cough, or visible cyanosis.  No visible retractions or increased work of breathing.    SKIN: Visible skin clear. No significant rash, abnormal pigmentation or lesions.  NEURO: Cranial nerves grossly intact.  Mentation and speech appropriate for age.  PSYCH: Mentation appears normal, affect normal/bright,  judgement and insight intact, normal speech and appearance well-groomed.            Video-Visit Details    Type of service:  Video Visit    Video End Time:1705    Originating Location (pt. Location): Home    Distant Location (provider location):  [facility]     Platform used for Video Visit: [employer]  Answers for HPI/ROS submitted by the patient on 12/8/2021  If you checked off any problems, how difficult have these problems made it for you to do your work, take care of things at home, or get along with other people?: Extremely difficult  PHQ9 TOTAL SCORE: 22  TYREL 7 TOTAL SCORE: 21

## 2021-12-09 ASSESSMENT — ANXIETY QUESTIONNAIRES: GAD7 TOTAL SCORE: 21

## 2021-12-09 ASSESSMENT — PATIENT HEALTH QUESTIONNAIRE - PHQ9: SUM OF ALL RESPONSES TO PHQ QUESTIONS 1-9: 22

## 2021-12-13 ENCOUNTER — VIRTUAL VISIT (OUTPATIENT)
Dept: FAMILY MEDICINE | Facility: CLINIC | Age: 31
End: 2021-12-13
Payer: COMMERCIAL

## 2021-12-13 DIAGNOSIS — J01.90 ACUTE SINUSITIS WITH SYMPTOMS > 10 DAYS: Primary | ICD-10-CM

## 2021-12-13 PROCEDURE — 99213 OFFICE O/P EST LOW 20 MIN: CPT | Mod: 95 | Performed by: PHYSICIAN ASSISTANT

## 2021-12-13 RX ORDER — AZITHROMYCIN 250 MG/1
TABLET, FILM COATED ORAL
Qty: 6 TABLET | Refills: 0 | Status: SHIPPED | OUTPATIENT
Start: 2021-12-13 | End: 2021-12-18

## 2021-12-13 NOTE — PROGRESS NOTES
Lizzie is a 31 year old who is being evaluated via a billable video visit.      How would you like to obtain your AVS? MyChart  If the video visit is dropped, the invitation should be resent by: Text to cell phone: [phone]  Will anyone else be joining your video visit? No      Video Start Time: 9:27 AM    Assessment & Plan     Acute sinusitis with symptoms > 10 days  Recommended continued supportive cares- try Flonase nasal spray for congestion.  Follow up if not improving in the next 5 days.  - azithromycin (ZITHROMAX) 250 MG tablet; Take 2 tablets (500 mg) by mouth daily for 1 day, THEN 1 tablet (250 mg) daily for 4 days.      Return in about 5 days (around 12/18/2021) for if symptoms worsen or fail to improve.    Emerson Garcia PA-C  [facility]   Lizzie is a 31 year old who presents for the following health issues     HPI     Acute Illness  Acute illness concerns: headache and sinus pressure   Onset/Duration: ALMOST 3 WEEKS   Symptoms:  Fever: no  Chills/Sweats: no  Headache (location?): YES  Sinus Pressure: YES- post-nasal drainage   Conjunctivitis:  YES  Ear Pain: YES: both-but right ear is worse.   Rhinorrhea: YES  Congestion: YES- chest congestion   Sore Throat: YES  Cough: YES-thick productive of yellow sputum, productive of green sputum, worsening over time  Wheeze: no  Decreased Appetite: YES  Nausea: YES  Vomiting: no  Diarrhea: YES  Dysuria/Freq.: no  Dysuria or Hematuria: no  Fatigue/Achiness: YES- both   Sick/Strep Exposure: YES- kids have been sick   Therapies tried and outcome: sudafed,edwardo pot, hot showers, essential oils,tylenol and ibuprofen.  Nothing has helped much.     Typically gets this once yearly.  Zpak helps.  Inside of her nostrils are getting very swollen and painful, more L >> R.    Review of Systems   Constitutional, HEENT, cardiovascular, pulmonary, gi and gu systems are negative, except as otherwise noted.      Objective    Vitals -  "Patient Reported  Weight (Patient Reported): 63.5 kg (140 lb)  Height (Patient Reported): 157.5 cm (5' 2\")  BMI (Based on Pt Reported Ht/Wt): 25.61  Temperature (Patient Reported): 97.6  F (36.4  C) (axillary)  Pain Score: Moderate Pain (4)  Pain Loc: Face (ears)      Vitals:  No vitals were obtained today due to virtual visit.    Physical Exam   GENERAL: Healthy, alert and no distress  EYES: Eyes grossly normal to inspection.  No discharge or erythema, or obvious scleral/conjunctival abnormalities.  RESP: No audible wheeze, cough, or visible cyanosis.  No visible retractions or increased work of breathing.    SKIN: Visible skin clear. No significant rash, abnormal pigmentation or lesions.  NEURO: Cranial nerves grossly intact.  Mentation and speech appropriate for age.  PSYCH: Mentation appears normal, affect normal/bright, judgement and insight intact, normal speech and appearance well-groomed.          Video-Visit Details    Type of service:  Video Visit    Video End Time:9:51 AM    Originating Location (pt. Location): Home    Distant Location (provider location):  [facility] "[employer]     Platform used for Video Visit: [employer]"

## 2022-01-25 ENCOUNTER — VIRTUAL VISIT (OUTPATIENT)
Dept: FAMILY MEDICINE | Facility: CLINIC | Age: 32
End: 2022-01-25
Payer: COMMERCIAL

## 2022-01-25 DIAGNOSIS — F33.1 MODERATE EPISODE OF RECURRENT MAJOR DEPRESSIVE DISORDER (H): Primary | ICD-10-CM

## 2022-01-25 DIAGNOSIS — F41.1 GAD (GENERALIZED ANXIETY DISORDER): ICD-10-CM

## 2022-01-25 DIAGNOSIS — F41.0 PANIC ATTACK: ICD-10-CM

## 2022-01-25 PROCEDURE — 99213 OFFICE O/P EST LOW 20 MIN: CPT | Mod: 95 | Performed by: NURSE PRACTITIONER

## 2022-01-25 RX ORDER — DULOXETIN HYDROCHLORIDE 20 MG/1
20 CAPSULE, DELAYED RELEASE ORAL DAILY
Qty: 30 CAPSULE | Refills: 1 | Status: SHIPPED | OUTPATIENT
Start: 2022-01-25 | End: 2022-03-10

## 2022-01-25 RX ORDER — ALPRAZOLAM 0.25 MG
0.25 TABLET ORAL 3 TIMES DAILY PRN
Qty: 10 TABLET | Refills: 0 | Status: SHIPPED | OUTPATIENT
Start: 2022-01-25

## 2022-01-25 RX ORDER — DULOXETIN HYDROCHLORIDE 30 MG/1
30 CAPSULE, DELAYED RELEASE ORAL DAILY
Qty: 30 CAPSULE | Refills: 1 | Status: SHIPPED | OUTPATIENT
Start: 2022-01-25 | End: 2022-03-22

## 2022-01-25 NOTE — PROGRESS NOTES
Lizzie is a 32 year old who is being evaluated via a billable video visit.      How would you like to obtain your AVS? MyChart  If the video visit is dropped, the invitation should be resent by: Text to cell phone: [phone]  Will anyone else be joining your video visit? No    Video Start Time: 1030    Assessment & Plan     Panic attack  We will continue Xanax at this point in time for panic attacks she states that they are not occurring more frequently and bright lights and she will take approximately 1/2 tablet rarely needs to take a full 25 mg as we increased dosing of Cymbalta we discussed cutting out Xanax do not recommend this as a long-term medication for her she verbalized understanding.  - ALPRAZolam (XANAX) 0.25 MG tablet; Take 1 tablet (0.25 mg) by mouth 3 times daily as needed for anxiety  - DULoxetine (CYMBALTA) 30 MG capsule; Take 1 capsule (30 mg) by mouth daily  - DULoxetine (CYMBALTA) 20 MG capsule; Take 1 capsule (20 mg) by mouth daily    Moderate episode of recurrent major depressive disorder (H)  We discussed increasing dosing of Cymbalta slowly she is sensitive to changes in medication dosing we also discussed possibly weaning off Cymbalta and changing her to Lexapro please see our previous encounter date.  She would rather try to increase dosing she is on a relatively low dose of 30 mg daily we discussed her taking 30 mg in the morning and 20 mg at bedtime.  She will follow-up in 2 weeks for mood and medication management she will also continue to see her therapist.  - DULoxetine (CYMBALTA) 30 MG capsule; Take 1 capsule (30 mg) by mouth daily  - DULoxetine (CYMBALTA) 20 MG capsule; Take 1 capsule (20 mg) by mouth daily    TYREL (generalized anxiety disorder)    - ALPRAZolam (XANAX) 0.25 MG tablet; Take 1 tablet (0.25 mg) by mouth 3 times daily as needed for anxiety  - DULoxetine (CYMBALTA) 30 MG capsule; Take 1 capsule (30 mg) by mouth daily  - DULoxetine (CYMBALTA) 20 MG capsule; Take 1  capsule (20 mg) by mouth daily         Patient Instructions   Recommend increasing dosing of Cymbalta as discussed with 30 mg in the morning and continue 20 mg in evening if needed may need to alternate days of this until you become accustomed with the increased dose.  Refills given for Xanax for your recurring panic attacks as well.  Please follow-up and reestablish care with a new primary care provider I can give you referral for Elisabeth Ferguson PA-C     Follow-up in approximately 2 weeks thank you        [place] [facility]    Nas Samuel is a 32 year old who presents for the following health issues     HPI     Depression and Anxiety Follow-Up    How are you doing with your depression since your last visit? No change    How are you doing with your anxiety since your last visit?  No change    Are you having other symptoms that might be associated with depression or anxiety? No    Have you had a significant life event? No     Do you have any concerns with your use of alcohol or other drugs? No    Social History     Tobacco Use     Smoking status: Never Smoker     Smokeless tobacco: Never Used   Vaping Use     Vaping Use: Never used   Substance Use Topics     Alcohol use: No     Drug use: No     PHQ 6/17/2021 9/10/2021 12/8/2021   PHQ-9 Total Score 0 10 22   Q9: Thoughts of better off dead/self-harm past 2 weeks Not at all Not at all Several days   F/U: Thoughts of suicide or self-harm - - No   F/U: Safety concerns - - No     TYREL-7 SCORE 7/24/2019 9/10/2021 12/8/2021   Total Score 4 (minimal anxiety) - 21 (severe anxiety)   Total Score 4 20 21     Last PHQ-9 1/25/2022   1.  Little interest or pleasure in doing things 1   2.  Feeling down, depressed, or hopeless 3   3.  Trouble falling or staying asleep, or sleeping too much 1   4.  Feeling tired or having little energy 3   5.  Poor appetite or overeating 1   6.  Feeling bad about yourself 3   7.  Trouble concentrating  3   8.  Moving slowly or restless 3   Q9: Thoughts of better off dead/self-harm past 2 weeks 1   PHQ-9 Total Score 19   Difficulty at work, home, or with people -   In the past two weeks have you had thoughts of suicide or self harm? No   Do you have concerns about your personal safety or the safety of others? No     TYREL-7  1/25/2022   1. Feeling nervous, anxious, or on edge 3   2. Not being able to stop or control worrying 3   3. Worrying too much about different things 3   4. Trouble relaxing 3   5. Being so restless that it is hard to sit still 3   6. Becoming easily annoyed or irritable 3   7. Feeling afraid, as if something awful might happen 3   TYREL-7 Total Score 21   If you checked any problems, how difficult have they made it for you to do your work, take care of things at home, or get along with other people? -       Suicide Assessment Five-step Evaluation and Treatment (SAFE-T)    Since her last visit symptoms have not improved.  We discussed switching medication during her last office visit to Effexor.  We also discussed possibly increasing the Cymbalta from 40 mg to 50 or 60 mg.  She continues to have panic attacks states that these are usually triggered by bright lights or loud noises she feels she has some sensory disturbances that are triggering her as well possibly senses smells and as stated noises and bright lights.  She can usually feel is coming and will take a half of a Xanax at 0.25 mg this seems to help with her panic attacks rather quickly and she is able to continue on with her day.  Last occurrence was while she was driving prior to this its been while she was grocery shopping.  She also sees a therapist on a regular basis and states this is to be helping.    Review of Systems   Constitutional, HEENT, cardiovascular, pulmonary, gi and gu systems are negative, except as otherwise noted.      Objective           Vitals:  No vitals were obtained today due to virtual visit.    Physical Exam    GENERAL: Healthy, alert and no distress  EYES: Eyes grossly normal to inspection.  No discharge or erythema, or obvious scleral/conjunctival abnormalities.  RESP: No audible wheeze, cough, or visible cyanosis.  No visible retractions or increased work of breathing.    SKIN: Visible skin clear. No significant rash, abnormal pigmentation or lesions.  NEURO: Cranial nerves grossly intact.  Mentation and speech appropriate for age.  PSYCH: Mentation appears normal, affect normal/bright, judgement and insight intact, normal speech and appearance well-groomed.                Video-Visit Details    Type of service:  Video Visit    Video End Time:1057    Originating Location (pt. Location): Home    Distant Location (provider location):  [facility]     Platform used for Video Visit: [employer]

## 2022-01-25 NOTE — PATIENT INSTRUCTIONS
Recommend increasing dosing of Cymbalta as discussed with 30 mg in the morning and continue 20 mg in evening if needed may need to alternate days of this until you become accustomed with the increased dose.  Refills given for Xanax for your recurring panic attacks as well.  Please follow-up and reestablish care with a new primary care provider I can give you referral for Elisabeth Ferguson PA-C     Follow-up in approximately 2 weeks thank you

## 2022-02-17 DIAGNOSIS — F41.0 PANIC ATTACK: ICD-10-CM

## 2022-02-17 DIAGNOSIS — F33.1 MODERATE EPISODE OF RECURRENT MAJOR DEPRESSIVE DISORDER (H): ICD-10-CM

## 2022-02-17 DIAGNOSIS — F41.1 GAD (GENERALIZED ANXIETY DISORDER): ICD-10-CM

## 2022-02-17 RX ORDER — DULOXETIN HYDROCHLORIDE 30 MG/1
CAPSULE, DELAYED RELEASE ORAL
Qty: 30 CAPSULE | Refills: 1 | OUTPATIENT
Start: 2022-02-17

## 2022-03-07 DIAGNOSIS — F33.1 MODERATE EPISODE OF RECURRENT MAJOR DEPRESSIVE DISORDER (H): ICD-10-CM

## 2022-03-07 DIAGNOSIS — F41.1 GAD (GENERALIZED ANXIETY DISORDER): ICD-10-CM

## 2022-03-07 DIAGNOSIS — F41.0 PANIC ATTACK: ICD-10-CM

## 2022-03-08 NOTE — TELEPHONE ENCOUNTER
"Pending Prescriptions:                       Disp   Refills    DULoxetine (CYMBALTA) 20 MG capsule [Pharm*30 cap*1        Sig: TAKE 1 CAPSULE BY MOUTH EVERY DAY    Routing refill request to provider for review/approval because:  PHQ-9 score:    PHQ 1/25/2022   PHQ-9 Total Score 19   Q9: Thoughts of better off dead/self-harm past 2 weeks Several days   F/U: Thoughts of suicide or self-harm No   F/U: Safety concerns No                 Requested Prescriptions   Pending Prescriptions Disp Refills    DULoxetine (CYMBALTA) 20 MG capsule [Pharmacy Med Name: DULOXETINE HCL DR 20 MG CAP] 30 capsule 1     Sig: TAKE 1 CAPSULE BY MOUTH EVERY DAY        Serotonin-Norepinephrine Reuptake Inhibitors  Failed - 3/7/2022  1:33 PM        Failed - PHQ-9 score of less than 5 in past 6 months     Please review last PHQ-9 score.           Passed - Blood pressure under 140/90 in past 12 months       BP Readings from Last 3 Encounters:   09/10/21 112/68   06/17/21 110/78   04/29/21 124/78                 Passed - Medication is active on med list        Passed - Patient is age 18 or older        Passed - No active pregnancy on record        Passed - No positive pregnancy test in past 12 months        Passed - Recent (6 mo) or future (30 days) visit within the authorizing provider's specialty     Patient had office visit in the last 6 months or has a visit in the next 30 days with authorizing provider or within the authorizing provider's specialty.  See \"Patient Info\" tab in inbasket, or \"Choose Columns\" in Meds & Orders section of the refill encounter.                        "

## 2022-03-10 RX ORDER — DULOXETIN HYDROCHLORIDE 20 MG/1
CAPSULE, DELAYED RELEASE ORAL
Qty: 30 CAPSULE | Refills: 1 | Status: SHIPPED | OUTPATIENT
Start: 2022-03-10

## 2022-03-20 DIAGNOSIS — F33.1 MODERATE EPISODE OF RECURRENT MAJOR DEPRESSIVE DISORDER (H): ICD-10-CM

## 2022-03-20 DIAGNOSIS — F41.0 PANIC ATTACK: ICD-10-CM

## 2022-03-20 DIAGNOSIS — F41.1 GAD (GENERALIZED ANXIETY DISORDER): ICD-10-CM

## 2022-03-22 RX ORDER — DULOXETIN HYDROCHLORIDE 30 MG/1
CAPSULE, DELAYED RELEASE ORAL
Qty: 30 CAPSULE | Refills: 1 | OUTPATIENT
Start: 2022-03-22

## 2022-03-28 DIAGNOSIS — F33.1 MODERATE EPISODE OF RECURRENT MAJOR DEPRESSIVE DISORDER (H): ICD-10-CM

## 2022-03-28 DIAGNOSIS — F41.1 GAD (GENERALIZED ANXIETY DISORDER): ICD-10-CM

## 2022-03-28 DIAGNOSIS — F41.0 PANIC ATTACK: ICD-10-CM

## 2022-03-29 RX ORDER — DULOXETIN HYDROCHLORIDE 30 MG/1
CAPSULE, DELAYED RELEASE ORAL
Qty: 30 CAPSULE | Refills: 1 | OUTPATIENT
Start: 2022-03-29

## 2022-04-24 ENCOUNTER — NURSE TRIAGE (OUTPATIENT)
Dept: NURSING | Facility: CLINIC | Age: 32
End: 2022-04-24

## 2022-04-24 NOTE — TELEPHONE ENCOUNTER
Pt has a fever and shortness of breath    Pt states that her taste is distorted      Having congestion, body aches and chills     Pt is vomiting and having diarrhea - started tonight     Pt states that she recently started taking some medications and that she is concerned that the nausea and vomiting could be from the new medication     Pt takes Cymbalta and Xanax per medical file     Temperature 99.0    Pt states her cold symptoms have been going on since Tuesday     Per protocol - Go to ED now     Care advice given per protocol and when to call back. Pt verbalized understanding and agrees to plan of care.    Estelita Ribera RN  [place] Nurse Advisor  3:12 AM 4/24/2022      COVID 19 Nurse Triage Plan/Patient Instructions    Please be aware that novel coronavirus (COVID-19) may be circulating in the community. If you develop symptoms such as fever, cough, or SOB or if you have concerns about the presence of another infection including coronavirus (COVID-19), please contact your health care provider or visit [email or web address].     Disposition/Instructions    ED Visit recommended. Follow protocol based instructions.     Bring Your Own Device:  Please also bring your smart device(s) (smart phones, tablets, laptops) and their charging cables for your personal use and to communicate with your care team during your visit.    Thank you for taking steps to prevent the spread of this virus.  o Limit your contact with others.  o Wear a simple mask to cover your cough.  o Wash your hands well and often.    Resources    [facility]: About COVID-19: [email or web address]    CDC: What to Do If You're Sick: www.cdc.gov/coronavirus/2019-ncov/about/steps-when-sick.html    CDC: Ending Home Isolation: www.cdc.gov/coronavirus/2019-ncov/hcp/disposition-in-home-patients.html     CDC: Caring for Someone: www.cdc.gov/coronavirus/2019-ncov/if-you-are-sick/care-for-someone.html     [facility]: Interim Guidance for  Hospital Discharge to Home: www.health.[facility].mn.us/diseases/coronavirus/hcp/hospdischarge.pdf    [facility] clinical trials (COVID-19 research studies): clinicalaffairs.[facility]/n-clinical-trials     Below are the COVID-19 hotlines at the [place] Department of Health ([facility]). Interpreters are available.   o For health questions: Call 991-946-2242 or 1-331.818.2041 (7 a.m. to 7 p.m.)  o For questions about schools and childcare: Call 727-298-2161 or 1-665.188.3113 (7 a.m. to 7 p.m.)                         Reason for Disposition    MODERATE difficulty breathing (e.g., speaks in phrases, SOB even at rest, pulse 100-120)    Additional Information    Negative: [1] Diagnosed or suspected COVID-19 AND [2] symptoms lasting 3 or more weeks    Negative: [1] COVID-19 exposure AND [2] no symptoms    Negative: COVID-19 vaccine reaction suspected (e.g., fever, headache, muscle aches) occurring 1 to 3 days after getting vaccine    Negative: COVID-19 vaccine, questions about    Negative: [1] Lives with someone known to have influenza (flu test positive) AND [2] flu-like symptoms (e.g., cough, runny nose, sore throat, SOB; with or without fever)    Negative: [1] Adult with possible COVID-19 symptoms AND [2] triager concerned about severity of symptoms or other causes    Negative: COVID-19 and breastfeeding, questions about    Negative: SEVERE or constant chest pain or pressure  (Exception: Mild central chest pain, present only when coughing.)    Negative: SEVERE difficulty breathing (e.g., struggling for each breath, speaks in single words)    Negative: Difficult to awaken or acting confused (e.g., disoriented, slurred speech)    Negative: Bluish (or gray) lips or face now    Negative: Shock suspected (e.g., cold/pale/clammy skin, too weak to stand, low BP, rapid pulse)    Negative: Sounds like a life-threatening emergency to the triager    Protocols used: CORONAVIRUS (COVID-19) DIAGNOSED OR [id number]  1.18.2022

## 2022-04-28 ENCOUNTER — OFFICE VISIT (OUTPATIENT)
Dept: FAMILY MEDICINE | Facility: CLINIC | Age: 32
End: 2022-04-28
Payer: COMMERCIAL

## 2022-04-28 VITALS
DIASTOLIC BLOOD PRESSURE: 72 MMHG | RESPIRATION RATE: 16 BRPM | HEART RATE: 96 BPM | BODY MASS INDEX: 26.19 KG/M2 | SYSTOLIC BLOOD PRESSURE: 108 MMHG | WEIGHT: 142.3 LBS | OXYGEN SATURATION: 100 % | HEIGHT: 62 IN | TEMPERATURE: 97.4 F

## 2022-04-28 DIAGNOSIS — J01.90 ACUTE SINUSITIS WITH SYMPTOMS > 10 DAYS: Primary | ICD-10-CM

## 2022-04-28 PROCEDURE — 99213 OFFICE O/P EST LOW 20 MIN: CPT | Performed by: STUDENT IN AN ORGANIZED HEALTH CARE EDUCATION/TRAINING PROGRAM

## 2022-04-28 RX ORDER — DOXYCYCLINE 100 MG/1
100 CAPSULE ORAL 2 TIMES DAILY
Qty: 20 CAPSULE | Refills: 0 | Status: SHIPPED | OUTPATIENT
Start: 2022-04-28 | End: 2022-05-08

## 2022-04-28 NOTE — PROGRESS NOTES
"  Assessment & Plan      Acute sinusitis with symptoms > 10 days  Given persistent symptoms we will treat with doxycycline given her allergies and recent antibiotic exposure.  Plan to follow-up if things not improving.  Can also continue with symptomatic treatment.  - doxycycline hyclate (VIBRAMYCIN) 100 MG capsule  Dispense: 20 capsule; Refill: 0      BMI:   Estimated body mass index is 26.03 kg/m  as calculated from the following:    Height as of this encounter: 1.575 m (5' 2\").    Weight as of this encounter: 64.5 kg (142 lb 4.8 oz).     Return if symptoms worsen or fail to improve.    Weston Garcias MD  [facility] CHARLIE Samuel is a 32 year old who presents for the following health issues     History of Present Illness       Reason for visit:  Possible sinus infection  Symptom onset:  1-2 weeks ago  Symptoms include:  Burning eyes, congestion, running nose, face & teeth pain, headache  Symptom intensity:  Severe  Symptom progression:  Staying the same  Had these symptoms before:  Yes  Has tried/received treatment for these symptoms:  Yes  Previous treatment was successful:  Yes  Prior treatment description:  Azithromycin 10 days  What makes it worse:  Laying down  What makes it better:  Breath right strips, ice packs, Flonase    She eats 2-3 servings of fruits and vegetables daily.She consumes 1 sweetened beverage(s) daily.She exercises with enough effort to increase her heart rate 60 or more minutes per day.  She exercises with enough effort to increase her heart rate 7 days per week.   She is taking medications regularly.       DERM PROBLEM    Check mole on right hip, changing shape    Patient was recently treated for sinus symptoms and concern for ear infection.  Tells me she was given azithromycin with minimal improvement her symptoms have persisted.  Facial pain with congestion and nasal discharge.     Review of Systems   Constitutional, HEENT, cardiovascular, " "pulmonary, gi and gu systems are negative, except as otherwise noted.      Objective    /72 (BP Location: Right arm, Patient Position: Sitting, Cuff Size: Adult Regular)   Pulse 96   Temp 97.4  F (36.3  C) (Temporal)   Resp 16   Ht 1.575 m (5' 2\")   Wt 64.5 kg (142 lb 4.8 oz)   LMP 04/21/2022 (Exact Date)   SpO2 100%   Breastfeeding No   BMI 26.03 kg/m    Body mass index is 26.03 kg/m .  Physical Exam   GENERAL: healthy, alert and no distress  EYES: Eyes grossly normal to inspection, PERRL and conjunctivae and sclerae normal  HENT: ear canals and TM's normal, nose and mouth without ulcers or lesions,  Frontal sinus tenderness to palpation, mild yellow nasal discharge  NECK: no adenopathy, no asymmetry, masses, or scars and thyroid normal to palpation  RESP: lungs clear to auscultation - no rales, rhonchi or wheezes  CV: regular rate and rhythm, normal S1 S2, no S3 or S4, no murmur, click or rub, no peripheral edema and peripheral pulses strong  MS: no gross musculoskeletal defects noted, no edema  SKIN: no suspicious lesions or rashes  NEURO: Normal strength and tone, mentation intact and speech normal  PSYCH: mentation appears normal, affect normal/bright          "

## 2022-06-06 NOTE — TELEPHONE ENCOUNTER
Reason for Call:  Other appointment and call back    Detailed comments: Patient is needing [facility] Inj today, patient said Pharmacy called and said they delivered the [facility] to the clinic at 10:05am today. Patient would like to have this done today.     Phone Number Patient can be reached at: Home number on file [phone] (home)    Best Time: Anytime    Can we leave a detailed message on this number? YES    Call taken on 11/20/2018 at 12:27 PM by Kaylynn Hong       Yes

## 2022-08-14 ENCOUNTER — HEALTH MAINTENANCE LETTER (OUTPATIENT)
Age: 32
End: 2022-08-14

## 2022-09-12 ENCOUNTER — OFFICE VISIT (OUTPATIENT)
Dept: FAMILY MEDICINE | Facility: OTHER | Age: 32
End: 2022-09-12
Payer: COMMERCIAL

## 2022-09-12 VITALS
DIASTOLIC BLOOD PRESSURE: 80 MMHG | TEMPERATURE: 97.5 F | WEIGHT: 145 LBS | RESPIRATION RATE: 22 BRPM | HEART RATE: 108 BPM | OXYGEN SATURATION: 97 % | BODY MASS INDEX: 26.68 KG/M2 | SYSTOLIC BLOOD PRESSURE: 110 MMHG | HEIGHT: 62 IN

## 2022-09-12 DIAGNOSIS — R19.7 DIARRHEA, UNSPECIFIED TYPE: ICD-10-CM

## 2022-09-12 DIAGNOSIS — R05.9 COUGH: Primary | ICD-10-CM

## 2022-09-12 PROCEDURE — 99213 OFFICE O/P EST LOW 20 MIN: CPT | Mod: CS | Performed by: PHYSICIAN ASSISTANT

## 2022-09-12 PROCEDURE — U0003 INFECTIOUS AGENT DETECTION BY NUCLEIC ACID (DNA OR RNA); SEVERE ACUTE RESPIRATORY SYNDROME CORONAVIRUS 2 (SARS-COV-2) (CORONAVIRUS DISEASE [COVID-19]), AMPLIFIED PROBE TECHNIQUE, MAKING USE OF HIGH THROUGHPUT TECHNOLOGIES AS DESCRIBED BY CMS-2020-01-R: HCPCS | Performed by: PHYSICIAN ASSISTANT

## 2022-09-12 PROCEDURE — U0005 INFEC AGEN DETEC AMPLI PROBE: HCPCS | Performed by: PHYSICIAN ASSISTANT

## 2022-09-12 NOTE — PROGRESS NOTES
Assessment & Plan     (R05.9) Cough  (primary encounter diagnosis)  (R19.7) Diarrhea, unspecified type  Comment: Patient has had symptoms for 3 days (HA, cough, sinus pain, nausea, diarrhea, sore throat). She has not done any at-home COVID testing. Suspect viral illness.  Discussed symptomatic care with the patient, including tylenol/ibuprofen, nasal steroids, nasal saline rinses, decongestants, drinking plenty of fluids, and resting. If not improving through the week then consider antibiotic management.   Plan:   - Symptomatic; Yes; 9/9/2022 COVID-19 Virus (Coronavirus) by PCR Nose  - Return to clinic for symptoms that worsen or do not improve.       Return in 5 days (on 9/17/2022), or if symptoms worsen or fail to improve, for If not improving, sooner if worse or new concerns.    Options for treatment and follow-up care were reviewed with the patient and/or guardian. Patient and/or guardian engaged in the decision making process and verbalized understanding of the options discussed and agreed with the final plan.    I, Surjit Ariza PA-C, was present with the Physician Assistant student who participated in the service and in the documentation of the note.  I have verified the history and personally performed the physical exam and medical decision making.  I agree with the assessment and plan of care as documented in the note.       LI Barkley PA-C  [facility] is [facility] 32 year old accompanied by her son, presenting for the following health issues:  Cold Symptoms    Cold symptoms  - started Friday  - has been taking tylenol/ibuprofen  - HA, cough, sinus pain, nausea, diarrhea, sore throat   - no fever, vomiting  - no at home COVID test  - hx frequent sinus infections - last in May 2022  - no known sick contacts    History of Present Illness       Reason for visit:  Sick  Symptom onset:  3-7 days ago  Symptoms include:  Headache cough body  "aches fatigue sinus pressure  Symptom intensity:  Moderate  Symptom progression:  Worsening  Had these symptoms before:  Yes  Has tried/received treatment for these symptoms:  Yes  Previous treatment was successful:  Yes  Prior treatment description:  Antibiotics  What makes it worse:  Lots of moving  What makes it better:  No    She eats 2-3 servings of fruits and vegetables daily.She consumes 1 sweetened beverage(s) daily.She exercises with enough effort to increase her heart rate 30 to 60 minutes per day.  She exercises with enough effort to increase her heart rate 7 days per week. She is missing 7 dose(s) of medications per week.    Today's PHQ-9         PHQ-9 Total Score: 0    PHQ-9 Q9 Thoughts of better off dead/self-harm past 2 weeks :   Not at all    How difficult have these problems made it for you to do your work, take care of things at home, or get along with other people: Not difficult at all     Review of Systems   Constitutional, HEENT, cardiovascular, pulmonary, gi and gu systems are negative, except as otherwise noted.      Objective    /80   Pulse 108   Temp 97.5  F (36.4  C) (Temporal)   Resp 22   Ht 1.575 m (5' 2\")   Wt 65.8 kg (145 lb)   SpO2 97%   BMI 26.52 kg/m    Body mass index is 26.52 kg/m .  Physical Exam   GENERAL: healthy, alert and no distress  EYES: Eyes grossly normal to inspection, PERRL and conjunctivae and sclerae normal  HENT: normal cephalic/atraumatic, ear canals and TM's normal, nasal mucosa edematous , rhinorrhea clear, oropharynx clear and oral mucous membranes moist  NECK: no adenopathy, no asymmetry, masses, or scars and thyroid normal to palpation  RESP: lungs clear to auscultation - no rales, rhonchi or wheezes  CV: regular rate and rhythm, normal S1 S2, no S3 or S4, no murmur, click or rub, no peripheral edema and peripheral pulses strong  MS: no gross musculoskeletal defects noted, no edema  SKIN: no suspicious lesions or rashes  PSYCH: mentation appears " normal, affect normal/bright    No results found for any visits on 09/12/22.

## 2022-09-12 NOTE — PATIENT INSTRUCTIONS
"Nasal Saline: At the pharmacy you can find a \"Nettipot\" or NeilMed Nasal Rinse.  This is a salt solution that you mix with warm water.  I want you to perform nasal saline washes at least twice daily while you are sick.  You can do this anytime you feel like you are developing nasal congestion.  To properly utilize be sure you are leaning forward as far as you can and either tilt or squeeze slowly.  In the beginning this can be difficult to get to work properly but as the congestion is improved it will work easier.  If you don't use these properly you can feel as if you're drowning.     Nasal Steroid: Fluticasone/Flonase or Nasacort, These are OTC medications for allergies.  They are steroid sprays that are localized to the nose so no systemic effects. Two sprays each nostril once daily - allergist noted it is most effective if used before bed.  Ok to continue to use nasal saline as well.  When you spray it in the nose it should be up and out towards the eye on the side you are spraying the medication.  You should not taste the medication and it should not drip out the nose. This will decrease inflammation and also nasal mucous production.  You can also use this anytime you are developing nasal congestion.  Ok in the future to start these as soon as you feel you are developing nasal congestion, sinus pressure, increased nasal drainage.    IMPORTANT: When using other nasal inhaled products especially 12 hour sprays such as Afrin you should only use for a max of approximately 3 days, longer use could result in rebound congestion (congestion that develops because you're off the medication).    If no blood pressure issues recommend Mucinex DM Max, if issues with your blood pressure ok to just use plain mucinex product.    Heat packs on sinuses    Ibuprofen as needed      Follow-up if symptoms worsen or do not resolve.  Feel free to call with any questions or concerns.      "

## 2022-09-13 ENCOUNTER — TELEPHONE (OUTPATIENT)
Dept: FAMILY MEDICINE | Facility: OTHER | Age: 32
End: 2022-09-13

## 2022-09-13 DIAGNOSIS — U07.1 INFECTION DUE TO 2019 NOVEL CORONAVIRUS: Primary | ICD-10-CM

## 2022-09-13 LAB — SARS-COV-2 RNA RESP QL NAA+PROBE: POSITIVE

## 2022-09-13 RX ORDER — ALBUTEROL SULFATE 90 UG/1
2 AEROSOL, METERED RESPIRATORY (INHALATION) EVERY 6 HOURS
Qty: 18 G | Refills: 0 | Status: SHIPPED | OUTPATIENT
Start: 2022-09-13 | End: 2022-10-05

## 2022-09-13 NOTE — TELEPHONE ENCOUNTER
Discussed the results of the testing for her an her sons.   She does not qualify for PO antiviral treatments.   She is requesting an inhaler for herself.  Discussed isolation recommendations, recommend 10 days, they can repeat home test as well.     Discussed that antibiotics are not appropriate at this time, if symptoms of sinusitis were to persist longer (10-14 days) then could consider treating with antibiotics for potential secondary bacterial infection but advised to use OTC treatments we discussed yesterday.     Follow-up if not improving, worse or new concerns.       Surjit Ariza PA-C

## 2022-09-13 NOTE — TELEPHONE ENCOUNTER
Pt is calling regarding appointments from yesterday for her and her two sons. She is having worse symptoms and just tested positive for Covid with one of her sons. She is hoping to hear from provider regarding something that will help symptoms as they are just getting worse. Her other son did not test positive but he is asthmatic and not doing well with whatever sickness he has.     Pt would like a call back at [phone] and something to help their symptoms sent to the pharmacy.

## 2022-10-05 DIAGNOSIS — U07.1 INFECTION DUE TO 2019 NOVEL CORONAVIRUS: ICD-10-CM

## 2022-10-05 RX ORDER — ALBUTEROL SULFATE 90 UG/1
2 AEROSOL, METERED RESPIRATORY (INHALATION) EVERY 6 HOURS
Qty: 18 G | Refills: 0 | Status: SHIPPED | OUTPATIENT
Start: 2022-10-05 | End: 2022-11-10

## 2022-11-10 ENCOUNTER — OFFICE VISIT (OUTPATIENT)
Dept: FAMILY MEDICINE | Facility: OTHER | Age: 32
End: 2022-11-10
Payer: COMMERCIAL

## 2022-11-10 VITALS
RESPIRATION RATE: 26 BRPM | BODY MASS INDEX: 25.78 KG/M2 | TEMPERATURE: 98.2 F | HEIGHT: 63 IN | SYSTOLIC BLOOD PRESSURE: 134 MMHG | HEART RATE: 98 BPM | OXYGEN SATURATION: 98 % | DIASTOLIC BLOOD PRESSURE: 86 MMHG | WEIGHT: 145.5 LBS

## 2022-11-10 DIAGNOSIS — J02.9 ACUTE PHARYNGITIS, UNSPECIFIED ETIOLOGY: Primary | ICD-10-CM

## 2022-11-10 DIAGNOSIS — J01.40 ACUTE PANSINUSITIS, RECURRENCE NOT SPECIFIED: ICD-10-CM

## 2022-11-10 LAB
DEPRECATED S PYO AG THROAT QL EIA: NEGATIVE
GROUP A STREP BY PCR: NOT DETECTED

## 2022-11-10 PROCEDURE — 87651 STREP A DNA AMP PROBE: CPT | Performed by: FAMILY MEDICINE

## 2022-11-10 PROCEDURE — 99213 OFFICE O/P EST LOW 20 MIN: CPT | Performed by: FAMILY MEDICINE

## 2022-11-10 RX ORDER — PREDNISONE 20 MG/1
40 TABLET ORAL DAILY
Qty: 10 TABLET | Refills: 0 | Status: SHIPPED | OUTPATIENT
Start: 2022-11-10 | End: 2022-11-15

## 2022-11-10 ASSESSMENT — PAIN SCALES - GENERAL: PAINLEVEL: SEVERE PAIN (7)

## 2022-11-10 NOTE — PROGRESS NOTES
Assessment & Plan       ICD-10-CM    1. Acute pharyngitis, unspecified etiology  J02.9 Streptococcus A Rapid Screen w/Reflex to PCR - Clinic Collect     Group A Streptococcus PCR Throat Swab     predniSONE (DELTASONE) 20 MG tablet      2. Acute pansinusitis, recurrence not specified  J01.40 predniSONE (DELTASONE) 20 MG tablet         Symptoms and testing are most consistent with a viral process.  Discussed possible trial of prednisone to help with her symptoms.  If not improving, would be willing to consider a course of doxycycline since she is allergic to Augmentin.    Portions of this note were completed using Dragon dictation software.  Although reviewed, there may be typographical and other inadvertent errors that remain.       Ordering of each unique test  Prescription drug management         Patient Instructions   Thank you for visiting [facility]    Take the steroids until they're gone.      Continue tylenol/ibuprofen to help with pain control until these take effect.    If not improving by Saturday, send me a message and I'll get a Rx for antibiotics sent in to cover possible bacterial sinusitis.    Contact us or return if questions or concerns.     Have a nice day!    Dr. Diaz     Return if symptoms worsen or fail to improve.      If you need medication refills, please contact your pharmacy 3 days before your prescriptions runs out or download the [facility] clau for your smart phone. If you are out of refills, your pharmacy will contact contact the clinic.                                     [facility] Assistance [phone]                    Return if symptoms worsen or fail to improve.    Donnell Diaz MD, MD  [facility] MICHELLE Samuel is a 32 year old, presenting for the following health issues:  Otalgia and Pharyngitis      History of Present Illness       Reason for visit:  Im sick  Symptom onset:  1-2 weeks ago  Symptoms include:   "Cough, sore throat, ear pain, headache  Symptom intensity:  Severe  Symptom progression:  Worsening  Had these symptoms before:  Yes    She eats 2-3 servings of fruits and vegetables daily.She consumes 1 sweetened beverage(s) daily.She exercises with enough effort to increase her heart rate 30 to 60 minutes per day.  She exercises with enough effort to increase her heart rate 7 days per week.   She is taking medications regularly.       Acute Illness  Was seen at [facility] on Monday and was told to keep an eye on her BP as it was elevated.  Acute illness concerns: ear pain, throat pain  Onset/Duration: over 1 week  Symptoms:  Fever: No  Chills/Sweats: YES  Headache (location?): YES- behind eyes and back of neck  Sinus Pressure: YES  Conjunctivitis:  No  Ear Pain: YES: bilateral  Rhinorrhea: YES  Congestion: YES  Sore Throat: YES  Cough: YES-productive of yellow sputum, barking, worsening over time  Wheeze: No  Decreased Appetite: YES  Nausea: YES  Vomiting: No  Diarrhea: No  Dysuria/Freq.: No  Dysuria or Hematuria: No  Fatigue/Achiness: YES  Sick/Strep Exposure: YES- parents and sister have influenza A, had been around them recently.    Therapies tried and outcome: netti pot, tylenol, ibuprofen, flonase nasal spray, hot showers, heat pack, breathe rite strips    Pt has had a terrible fatigue and headache since about a week ago.  Was seen at [facility] urgent care on Monday.  They did COVID, flu , and strep tests, which were negative.      Currently, mostly has severe pain in her ears and throat.      Hasn't used albuterol at all.  Did have COVID in October.        Review of Systems   Constitutional, HEENT, cardiovascular, pulmonary, gi and gu systems are negative, except as otherwise noted.      Objective    BP (!) 133/93 (Cuff Size: Adult Regular)   Pulse 98   Temp 98.2  F (36.8  C) (Oral)   Resp 26   Ht 1.594 m (5' 2.76\")   Wt 66 kg (145 lb 8 oz)   LMP  (LMP Unknown)   SpO2 98%   BMI 25.97 " kg/m    Body mass index is 25.97 kg/m .  Physical Exam   GENERAL: healthy, alert and no distress  EYES: Eyes grossly normal to inspection, PERRL and conjunctivae and sclerae normal  HENT: normal cephalic/atraumatic, ear canals and TM's normal, nose and mouth without ulcers or lesions, tonsillar hypertrophy, tonsillar erythema and sinuses: maxillary, frontal, mastoid tenderness on both sides  NECK: bilateral anterior cervical adenopathy, no asymmetry, masses, or scars and thyroid normal to palpation  RESP: lungs clear to auscultation - no rales, rhonchi or wheezes  CV: regular rate and rhythm, normal S1 S2, no S3 or S4, no murmur, click or rub, no peripheral edema and peripheral pulses strong  ABDOMEN: soft, nontender, no hepatosplenomegaly, no masses and bowel sounds normal  MS: no gross musculoskeletal defects noted, no edema    Office Visit on 09/12/2022   Component Date Value Ref Range Status     SARS CoV2 PCR 09/12/2022 Positive (A)  Negative Final    POSITIVE: SARS-CoV-2 (COVID-19) RNA detected, presumed positive.     No results found for any visits on 11/10/22.  No results found for this or any previous visit (from the past 24 hour(s)).

## 2022-11-10 NOTE — PATIENT INSTRUCTIONS
Thank you for visiting [facility]    Take the steroids until they're gone.      Continue tylenol/ibuprofen to help with pain control until these take effect.    If not improving by Saturday, send me a message and I'll get a Rx for antibiotics sent in to cover possible bacterial sinusitis.    Contact us or return if questions or concerns.     Have a nice day!    Dr. Diaz     Return if symptoms worsen or fail to improve.      If you need medication refills, please contact your pharmacy 3 days before your prescriptions runs out or download the [facility] clau for your smart phone. If you are out of refills, your pharmacy will contact contact the clinic.                                     MyChart Assistance [phone]

## 2022-11-15 ENCOUNTER — MYC MEDICAL ADVICE (OUTPATIENT)
Dept: FAMILY MEDICINE | Facility: OTHER | Age: 32
End: 2022-11-15

## 2022-11-15 DIAGNOSIS — J01.40 ACUTE PANSINUSITIS, RECURRENCE NOT SPECIFIED: Primary | ICD-10-CM

## 2022-11-15 RX ORDER — DOXYCYCLINE 100 MG/1
100 CAPSULE ORAL 2 TIMES DAILY
Qty: 20 CAPSULE | Refills: 0 | Status: SHIPPED | OUTPATIENT
Start: 2022-11-15 | End: 2022-11-25

## 2022-11-19 ENCOUNTER — HEALTH MAINTENANCE LETTER (OUTPATIENT)
Age: 32
End: 2022-11-19

## 2023-06-23 ENCOUNTER — E-VISIT (OUTPATIENT)
Dept: FAMILY MEDICINE | Facility: CLINIC | Age: 33
End: 2023-06-23
Payer: COMMERCIAL

## 2023-06-23 DIAGNOSIS — R51.9 ACUTE NONINTRACTABLE HEADACHE, UNSPECIFIED HEADACHE TYPE: Primary | ICD-10-CM

## 2023-06-23 PROCEDURE — 99207 PR NON-BILLABLE SERV PER CHARTING: CPT | Performed by: NURSE PRACTITIONER

## 2023-06-23 NOTE — PATIENT INSTRUCTIONS
Dear Lizzie Oconnell,    We are sorry you are not feeling well. Based on the responses you provided, you may be experiencing a serious health condition that needs immediate in-person attention. It is recommended that you be seen in the emergency room in order to better evaluate your symptoms. Please click here to find the nearest Emergency Room.     Diana Berkowitz NP

## 2023-07-17 ENCOUNTER — OFFICE VISIT (OUTPATIENT)
Dept: FAMILY MEDICINE | Facility: OTHER | Age: 33
End: 2023-07-17
Payer: COMMERCIAL

## 2023-07-17 VITALS
RESPIRATION RATE: 18 BRPM | BODY MASS INDEX: 26.3 KG/M2 | OXYGEN SATURATION: 98 % | HEART RATE: 111 BPM | SYSTOLIC BLOOD PRESSURE: 120 MMHG | WEIGHT: 147.31 LBS | DIASTOLIC BLOOD PRESSURE: 76 MMHG | TEMPERATURE: 98.3 F

## 2023-07-17 DIAGNOSIS — F41.1 GAD (GENERALIZED ANXIETY DISORDER): ICD-10-CM

## 2023-07-17 DIAGNOSIS — J32.9 CHRONIC SINUSITIS, UNSPECIFIED LOCATION: ICD-10-CM

## 2023-07-17 DIAGNOSIS — R06.83 SNORING: Primary | ICD-10-CM

## 2023-07-17 DIAGNOSIS — R09.81 NASAL CONGESTION: ICD-10-CM

## 2023-07-17 DIAGNOSIS — G47.19 EXCESSIVE DAYTIME SLEEPINESS: ICD-10-CM

## 2023-07-17 DIAGNOSIS — F33.1 MODERATE EPISODE OF RECURRENT MAJOR DEPRESSIVE DISORDER (H): ICD-10-CM

## 2023-07-17 LAB
ERYTHROCYTE [DISTWIDTH] IN BLOOD BY AUTOMATED COUNT: 13 % (ref 10–15)
FERRITIN SERPL-MCNC: 37 NG/ML (ref 6–175)
HCT VFR BLD AUTO: 40.4 % (ref 35–47)
HGB BLD-MCNC: 13.2 G/DL (ref 11.7–15.7)
IRON BINDING CAPACITY (ROCHE): 311 UG/DL (ref 240–430)
IRON SATN MFR SERPL: 23 % (ref 15–46)
IRON SERPL-MCNC: 73 UG/DL (ref 37–145)
MCH RBC QN AUTO: 29.5 PG (ref 26.5–33)
MCHC RBC AUTO-ENTMCNC: 32.7 G/DL (ref 31.5–36.5)
MCV RBC AUTO: 90 FL (ref 78–100)
PLATELET # BLD AUTO: 372 10E3/UL (ref 150–450)
RBC # BLD AUTO: 4.48 10E6/UL (ref 3.8–5.2)
WBC # BLD AUTO: 8.7 10E3/UL (ref 4–11)

## 2023-07-17 PROCEDURE — 83550 IRON BINDING TEST: CPT | Performed by: PHYSICIAN ASSISTANT

## 2023-07-17 PROCEDURE — 85027 COMPLETE CBC AUTOMATED: CPT | Performed by: PHYSICIAN ASSISTANT

## 2023-07-17 PROCEDURE — 36415 COLL VENOUS BLD VENIPUNCTURE: CPT | Performed by: PHYSICIAN ASSISTANT

## 2023-07-17 PROCEDURE — 83540 ASSAY OF IRON: CPT | Performed by: PHYSICIAN ASSISTANT

## 2023-07-17 PROCEDURE — 82728 ASSAY OF FERRITIN: CPT | Performed by: PHYSICIAN ASSISTANT

## 2023-07-17 PROCEDURE — 99214 OFFICE O/P EST MOD 30 MIN: CPT | Performed by: PHYSICIAN ASSISTANT

## 2023-07-17 RX ORDER — FLUTICASONE PROPIONATE 50 MCG
1-2 SPRAY, SUSPENSION (ML) NASAL DAILY
Qty: 16 G | Refills: 3 | Status: SHIPPED | OUTPATIENT
Start: 2023-07-17

## 2023-07-17 RX ORDER — LEVOMEFOLATE CALCIUM 15 MG
7.5 TABLET ORAL DAILY
COMMUNITY

## 2023-07-17 ASSESSMENT — PAIN SCALES - GENERAL: PAINLEVEL: NO PAIN (0)

## 2023-07-17 ASSESSMENT — PATIENT HEALTH QUESTIONNAIRE - PHQ9
SUM OF ALL RESPONSES TO PHQ QUESTIONS 1-9: 9
SUM OF ALL RESPONSES TO PHQ QUESTIONS 1-9: 9
10. IF YOU CHECKED OFF ANY PROBLEMS, HOW DIFFICULT HAVE THESE PROBLEMS MADE IT FOR YOU TO DO YOUR WORK, TAKE CARE OF THINGS AT HOME, OR GET ALONG WITH OTHER PEOPLE: VERY DIFFICULT

## 2023-07-17 NOTE — PATIENT INSTRUCTIONS
Labs today, await results   Flonase - 2 sprays each side once a day, nasal saline as needed   Consults with ENT and sleep

## 2023-07-17 NOTE — PROGRESS NOTES
Assessment & Plan     ICD-10-CM    1. Snoring  R06.83 Adult Sleep Eval & Management  Referral     Ferritin     CBC with platelets     Iron and iron binding capacity     Iron and iron binding capacity     CBC with platelets     Ferritin      2. Excessive daytime sleepiness  G47.19 Adult Sleep Eval & Management  Referral     Ferritin     CBC with platelets     Iron and iron binding capacity     Iron and iron binding capacity     CBC with platelets     Ferritin      3. Nasal congestion  R09.81 fluticasone (FLONASE) 50 MCG/ACT nasal spray     Adult ENT  Referral      4. Chronic sinusitis, unspecified location  J32.9 fluticasone (FLONASE) 50 MCG/ACT nasal spray     Adult ENT  Referral      5. Moderate episode of recurrent major depressive disorder (H)  F33.1       6. TYREL (generalized anxiety disorder)  F41.1         - Patient reporting awakening gasping for air, poor sleep initiation and maintenance, snoring, and chronic sinusitis   - On exam, does have narrow oropharynx   - Mood        Could be contributing but patient on Cymbalta managed by Racquel and feeling well   - Recommend ENT consult due to chronic sinusitis and possible nasal polyp      Will also start Flonase, reviewed use and side effects   - Will refer to sleep specialist   - Recommend checking iron levels as low ferritin has been associated      Await lab results     Review of the result(s) of each unique test - None    Diagnosis or treatment significantly limited by social determinants of health - None     40 minutes spent on the date of the encounter doing chart review, history and exam, documentation and further activities as noted above    The patient indicates understanding of these issues and agrees with the plan.    Follow up:  With specialists     [facility] is [facility] 33 year old, presenting for the following health issues:  Sleep  Apnea        7/17/2023     3:49 PM   Additional Questions   Roomed by [place]     History of Present Illness       Reason for visit:  Troubled breathing while sleeping    She eats 2-3 servings of fruits and vegetables daily.She consumes 1 sweetened beverage(s) daily.She exercises with enough effort to increase her heart rate 30 to 60 minutes per day.  She exercises with enough effort to increase her heart rate 6 days per week.   She is taking medications regularly.    Today's PHQ-9         PHQ-9 Total Score: 9    PHQ-9 Q9 Thoughts of better off dead/self-harm past 2 weeks :   Not at all    How difficult have these problems made it for you to do your work, take care of things at home, or get along with other people: Very difficult       Sleep issues   Onset/Duration: ongoing   Description:   Time to fall asleep (sleep latency): takes awhile   Middle of night awakening:  YES- gasping for air   Early morning awakening:  No  Progression of Symptoms:  same  Accompanying Signs & Symptoms:  Daytime sleepiness/napping: YES, last few months has to nap every day   Excessive snoring/apnea: YES  Restless legs: YES  Depression symptoms (if yes, do PHQ9): YES  Anxiety symptoms (if yes, do TYREL-7): YES  Precipitating factors:   Caffeine intake: YES- 1 cup per day   Any new medications: No  Alleviating factors:  Self medicating (alcohol, etc.):  No  Stress-reduction (exercise, yoga, meditation etc): No  Therapies tried and outcome: none    - Dad has CARRINGTON   - Realized was awakening gasping for air, not all the time but feels more      Asked  if she snores and he said she does a lot      He videoed her and it was eye opening   - Has 5 kids      Always tired all day long   - Chronic sinus issues      Gets infections once a month in the winter       Often feels can't breathe out left side of nose   - Mood      Being managed by Racquel - feels is doing very well on Cymbalta      Did genetic testing, told SIVAKUMAR, takes  L-methylfolate      Didn't tolerate iron, made very sick       Review of Systems   Constitutional, HEENT, cardiovascular, pulmonary, gi and gu systems are negative, except as otherwise noted.      Objective    /76   Pulse 111   Temp 98.3  F (36.8  C) (Temporal)   Resp 18   Wt 66.8 kg (147 lb 5 oz)   SpO2 98%   BMI 26.30 kg/m    Body mass index is 26.3 kg/m .  Physical Exam   GENERAL APPEARANCE: healthy, alert and no distress  EYES: Eyes grossly normal to inspection, PERRLA, conjunctivae and sclerae without injection or discharge, EOM intact   HENT: Right - nasal turbinates without swelling, erythema, or discharge, left - slightly pale with clear discharge and edema vs. Nasal polyp, mouth without ulcers or lesions, oropharynx clear and oral mucous membranes moist but narrow   RESP: Lungs clear to auscultation - no rales, rhonchi or wheezes   CV: Regular rates and rhythm, normal S1 S2, no S3 or S4, no murmur, click or rub  MS: No musculoskeletal defects are noted and gait is age appropriate without ataxia   SKIN: No suspicious lesions or rashes, hydration status appears adeuqate with normal skin turgor   PSYCH: Alert and oriented x3; speech- coherent , normal rate and volume; able to articulate logical thoughts, able to abstract reason, no tangential thoughts, no hallucinations or delusions, mentation appears normal, Mood is euthymic. Affect is appropriate for this mood state and bright. Thought content is free of suicidal ideation, hallucinations, and delusions. Dress is adequate and upkept. Eye contact is good during conversation.         Diagnostics: see orders pending in Epic

## 2023-07-18 NOTE — RESULT ENCOUNTER NOTE
Roney Samuel    Your results were normal.     The results are attached for your review.       Jim Norwood PA-C

## 2023-08-15 DIAGNOSIS — R09.81 NASAL CONGESTION: ICD-10-CM

## 2023-08-15 DIAGNOSIS — J32.9 CHRONIC SINUSITIS, UNSPECIFIED LOCATION: ICD-10-CM

## 2023-08-15 RX ORDER — FLUTICASONE PROPIONATE 50 MCG
1-2 SPRAY, SUSPENSION (ML) NASAL DAILY
Qty: 16 ML | Refills: 3 | OUTPATIENT
Start: 2023-08-15

## 2023-09-09 ENCOUNTER — HEALTH MAINTENANCE LETTER (OUTPATIENT)
Age: 33
End: 2023-09-09

## 2023-10-17 ASSESSMENT — SLEEP AND FATIGUE QUESTIONNAIRES
HOW LIKELY ARE YOU TO NOD OFF OR FALL ASLEEP WHILE SITTING AND TALKING TO SOMEONE: WOULD NEVER DOZE
HOW LIKELY ARE YOU TO NOD OFF OR FALL ASLEEP WHILE WATCHING TV: HIGH CHANCE OF DOZING
HOW LIKELY ARE YOU TO NOD OFF OR FALL ASLEEP WHILE SITTING AND READING: HIGH CHANCE OF DOZING
HOW LIKELY ARE YOU TO NOD OFF OR FALL ASLEEP WHILE LYING DOWN TO REST IN THE AFTERNOON WHEN CIRCUMSTANCES PERMIT: HIGH CHANCE OF DOZING
HOW LIKELY ARE YOU TO NOD OFF OR FALL ASLEEP WHILE SITTING QUIETLY AFTER LUNCH WITHOUT ALCOHOL: WOULD NEVER DOZE
HOW LIKELY ARE YOU TO NOD OFF OR FALL ASLEEP IN A CAR, WHILE STOPPED FOR A FEW MINUTES IN TRAFFIC: WOULD NEVER DOZE
HOW LIKELY ARE YOU TO NOD OFF OR FALL ASLEEP WHILE SITTING INACTIVE IN A PUBLIC PLACE: WOULD NEVER DOZE
HOW LIKELY ARE YOU TO NOD OFF OR FALL ASLEEP WHEN YOU ARE A PASSENGER IN A CAR FOR AN HOUR WITHOUT A BREAK: MODERATE CHANCE OF DOZING

## 2023-10-18 ENCOUNTER — VIRTUAL VISIT (OUTPATIENT)
Dept: SLEEP MEDICINE | Facility: CLINIC | Age: 33
End: 2023-10-18
Payer: COMMERCIAL

## 2023-10-18 VITALS — BODY MASS INDEX: 26.68 KG/M2 | WEIGHT: 145 LBS | HEIGHT: 62 IN

## 2023-10-18 DIAGNOSIS — F41.9 ANXIETY AND DEPRESSION: ICD-10-CM

## 2023-10-18 DIAGNOSIS — F51.04 PSYCHOPHYSIOLOGICAL INSOMNIA: ICD-10-CM

## 2023-10-18 DIAGNOSIS — G47.19 EXCESSIVE DAYTIME SLEEPINESS: ICD-10-CM

## 2023-10-18 DIAGNOSIS — F51.04 CHRONIC INSOMNIA: ICD-10-CM

## 2023-10-18 DIAGNOSIS — G47.9 SLEEP DISTURBANCE: Primary | ICD-10-CM

## 2023-10-18 DIAGNOSIS — R06.81 WITNESSED APNEIC SPELLS: ICD-10-CM

## 2023-10-18 DIAGNOSIS — Z72.821 INADEQUATE SLEEP HYGIENE: ICD-10-CM

## 2023-10-18 DIAGNOSIS — R53.83 FATIGUE, UNSPECIFIED TYPE: ICD-10-CM

## 2023-10-18 DIAGNOSIS — F32.A ANXIETY AND DEPRESSION: ICD-10-CM

## 2023-10-18 DIAGNOSIS — R06.83 SNORING: ICD-10-CM

## 2023-10-18 PROCEDURE — 99204 OFFICE O/P NEW MOD 45 MIN: CPT | Mod: VID | Performed by: INTERNAL MEDICINE

## 2023-10-18 ASSESSMENT — PAIN SCALES - GENERAL: PAINLEVEL: NO PAIN (0)

## 2023-10-18 NOTE — NURSING NOTE
Has patient had flu shot for current/most recent flu season? If so, when? No      Is the patient currently in the state of [place]? YES    Visit mode:VIDEO    If the visit is dropped, the patient can be reconnected by: VIDEO VISIT: Text to cell phone:   Telephone Information:   Mobile [phone]       Will anyone else be joining the visit? NO  (If patient encounters technical issues they should call [phone]    How would you like to obtain your AVS? MyChart    Are changes needed to the allergy or medication list? No    Reason for visit: Consult    [place] NYEF  '

## 2023-10-18 NOTE — PATIENT INSTRUCTIONS
"          MY TREATMENT INFORMATION FOR SLEEP APNEA-  Lizzie Oconnell    DOCTOR : Gerald Vu MD    Am I having a sleep study at a sleep center?  --->Due to normal delays, you will be contacted within 2-4 weeks to schedule    Am I having a home sleep study?  --->Watch the video for the device you are using:    -/drop off device-   https://www.Mobango.com/watch?v=yGGFBdELGhk    Frequently asked questions:  1. What is Obstructive Sleep Apnea (CARRINGTON)? CARRINGTON is the most common type of sleep apnea. Apnea means, \"without breath.\"  Apnea is most often caused by narrowing or collapse of the upper airway as muscles relax during sleep.   Almost everyone has occasional apneas. Most people with sleep apnea have had brief interruptions at night frequently for many years.  The severity of sleep apnea is related to how frequent and severe the events are.   2. What are the consequences of CARRINGTON? Symptoms include: feeling sleepy during the day, snoring loudly, gasping or stopping of breathing, trouble sleeping, and occasionally morning headaches or heartburn at night.  Sleepiness can be serious and even increase the risk of falling asleep while driving. Other health consequences may include development of high blood pressure and other cardiovascular disease in persons who are susceptible. Untreated CARRINGTON  can contribute to heart disease, stroke and diabetes.   3. What are the treatment options? In most situations, sleep apnea is a lifelong disease that must be managed with daily therapy. Medications are not effective for sleep apnea and surgery is generally not considered until other therapies have been tried. Your treatment is your choice . Continuous Positive Airway (CPAP) works right away and is the therapy that is effective in nearly everyone. An oral device to hold your jaw forward is usually the next most reliable option. Other options include postioning devices (to keep you off your back), weight " loss, and surgery including a tongue pacing device. There is more detail about some of these options below.  4. Are my sleep studies covered by insurance? Although we will request verification of coverage, we advise you also check in advance of the study to ensure there is coverage.    Important tips for those choosing CPAP and similar devices  For new devices, sign up for device CLAU to monitor your device for your followup visits  We encourage you to utilize the [employer] clau or website (myAir web (resmed.com) ) to monitor your therapy progress and share the data with your healthcare team when you discuss your sleep apnea.                                                    Know your equipment:  CPAP is continuous positive airway pressure that prevents obstructive sleep apnea by keeping the throat from collapsing while you are sleeping. In most cases, the device is  smart  and can slowly self-adjusts if your throat collapses and keeps a record every day of how well you are treated-this information is available to you and your care team.  BPAP is bilevel positive airway pressure that keeps your throat open and also assists each breath with a pressure boost to maintain adequate breathing.  Special kinds of BPAP are used in patients who have inadequate breathing from lung or heart disease. In most cases, the device is  smart  and can slowly self-adjusts to assist breathing. Like CPAP, the device keeps a record of how well you are treated.  Your mask is your connection to the device. You get to choose what feels most comfortable and the staff will help to make sure if fits. Here: are some examples of the different masks that are available:       Key points to remember on your journey with sleep apnea:  Sleep study.  PAP devices often need to be adjusted during a sleep study to show that they are effective and adjusted right.  Good tips to remember: Try wearing just the mask during a quiet time during the day so  your body adapts to wearing it. A humidifier is recommended for comfort in most cases to prevent drying of your nose and throat. Allergy medication from your provider may help you if you are having nasal congestion.  Getting settled-in. It takes more than one night for most of us to get used to wearing a mask. Try wearing just the mask during a quiet time during the day so your body adapts to wearing it. A humidifier is recommended for comfort in most cases. Our team will work with you carefully on the first day and will be in contact within 4 days and again at 2 and 4 weeks for advice and remote device adjustments. Your therapy is evaluated by the device each day.   Use it every night. The more you are able to sleep naturally for 7-8 hours, the more likely you will have good sleep and to prevent health risks or symptoms from sleep apnea. Even if you use it 4 hours it helps. Occasionally all of us are unable to use a medical therapy, in sleep apnea, it is not dangerous to miss one night.   Communicate. Call our skilled team on the number provided on the first day if your visit for problems that make it difficult to wear the device. Over 2 out of 3 patients can learn to wear the device long-term with help from our team. Remember to call our team or your sleep providers if you are unable to wear the device as we may have other solutions for those who cannot adapt to mask CPAP therapy. It is recommended that you sleep your sleep provider within the first 3 months and yearly after that if you are not having problems.   Use it for your health. We encourage use of CPAP masks during daytime quiet periods to allow your face and brain to adapt to the sensation of CPAP so that it will be a more natural sensation to awaken to at night or during naps. This can be very useful during the first few weeks or months of adapting to CPAP though it does not help medically to wear CPAP during wakefulness and  should not be used as a  strategy just to meet guidelines.  Take care of your equipment. Make sure you clean your mask and tubing using directions every day and that your filter and mask are replaced as recommended or if they are not working.     BESIDES CPAP, WHAT OTHER THERAPIES ARE THERE?    Positioning Device  Positioning devices are generally used when sleep apnea is mild and only occurs on your back.This example shows a pillow that straps around the waist. It may be appropriate for those whose sleep study shows milder sleep apnea that occurs primarily when lying flat on one's back. Preliminary studies have shown benefit but effectiveness at home may need to be verified by a home sleep test. These devices are generally not covered by medical insurance.  Examples of devices that maintain sleeping on the back to prevent snoring and mild sleep apnea.    Belt type body positioner  http://DocuTAP/    Electronic reminder  http://nightshifttherapy.com/            Oral Appliance  What is oral appliance therapy?  An oral appliance device fits on your teeth at night like a retainer used after having braces. The device is made by a specialized dentist and requires several visits over 1-2 months before a manufactured device is made to fit your teeth and is adjusted to prevent your sleep apnea. Once an oral device is working properly, snoring should be improved. A home sleep test may be recommended at that time if to determine whether the sleep apnea is adequately treated.       Some things to remember:  -Oral devices are often, but not always, covered by your medical insurance. Be sure to check with your insurance provider.   -If you are referred for oral therapy, you will be given a list of specialized dentists to consider or you may choose to visit the Web site of the American Academy of Dental Sleep Medicine  -Oral devices are less likely to work if you have severe sleep apnea or are extremely overweight.     More detailed information  An  oral appliance is a small acrylic device that fits over the upper and lower teeth  (similar to a retainer or a mouth guard). This device slightly moves jaw forward, which moves the base of the tongue forward, opens the airway, improves breathing for effective treat snoring and obstructive sleep apnea in perhaps 7 out of 10 people .  The best working devices are custom-made by a dental device  after a mold is made of the teeth 1, 2, 3.  When is an oral appliance indicated?  Oral appliance therapy is recommended as a first-line treatment for patients with primary snoring, mild sleep apnea, and for patients with moderate sleep apnea who prefer appliance therapy to use of CPAP4, 5. Severity of sleep apnea is determined by sleep testing and is based on the number of respiratory events per hour of sleep.   How successful is oral appliance therapy?  The success rate of oral appliance therapy in patients with mild sleep apnea is 75-80% while in patients with moderate sleep apnea it is 50-70%. The chance of success in patients with severe sleep apnea is 40-50%. The research also shows that oral appliances have a beneficial effect on the cardiovascular health of CARRINGTON patients at the same magnitude as CPAP therapy7.  Oral appliances should be a second-line treatment in cases of severe sleep apnea, but if not completely successful then a combination therapy utilizing CPAP plus oral appliance therapy may be effective. Oral appliances tend to be effective in a broad range of patients although studies show that the patients who have the highest success are females, younger patients, those with milder disease, and less severe obesity. 3, 6.   Finding a dentist that practices dental sleep medicine  Specific training is available through the American Academy of Dental Sleep Medicine for dentists interested in working in the field of sleep. To find a dentist who is educated in the field of sleep and the use of oral  appliances, near you, visit the Web site of the American Academy of Dental Sleep Medicine.    References  1. Kathy, et al. Objectively measured vs self-reported compliance during oral appliance therapy for sleep-disordered breathing. Chest 2013; 144(5): 1822-2864.  2. Lucía et al. Objective measurement of compliance during oral appliance therapy for sleep-disordered breathing. Thorax 2013; 68(1): 91-96.  3. Jerome et al. Mandibular advancement devices in 620 men and women with CARRINGTON and snoring: tolerability and predictors of treatment success. Chest 2004; 125: 6836-6685.  4. Brooks, et al. Oral appliances for snoring and CARRINGTON: a review. Sleep 2006; 29: 244-262.  5. Damion et al. Oral appliance treatment for CARRINGTON: an update. J Clin Sleep Med 2014; 10(2): 215-227.  6. Roula et al. Predictors of OSAH treatment outcome. J Dent Res 2007; 86: 8036-3841.      Weight Loss:    Weight loss is a long-term strategy that may improve sleep apnea in some patients.    Weight management is a personal decision and the decision should be based on your interest and the potential benefits.  If you are interested in exploring weight loss strategies, the following discussion covers the impact on weight loss on sleep apnea and the approaches that may be successful.    Being overweight does not necessarily mean you will have health consequences.  Those who have BMI over 35 or over 27 with existing medical conditions carries greater risk.   Weight loss decreases severity of sleep apnea in most people with obesity. For those with mild obesity who have developed snoring with weight gain, even 15-30 pound weight loss can improve and occasionally eliminate sleep apnea.  Structured and life-long dietary and health habits are necessary to lose weight and keep healthier weight levels.     Though there may be significant health benefits from weight loss, long-term weight loss is very difficult to achieve- studies show success  with dietary management in less than 10% of people. In addition, substantial weight loss may require years of dietary control and may be difficult if patients have severe obesity. In these cases, surgical management may be considered.  Finally, older individuals who have tolerated obesity without health complications may be less likely to benefit from weight loss strategies.      [unfilled]    Surgery:    Surgery for obstructive sleep apnea is considered generally only when other therapies fail to work. Surgery may be discussed with you if you are having a difficult time tolerating CPAP and or when there is an abnormal structure that requires surgical correction.  Nose and throat surgeries often enlarge the airway to prevent collapse.  Most of these surgeries create pain for 1-2 weeks and up to half of the most common surgeries are not effective throughout life.  You should carefully discuss the benefits and drawbacks to surgery with your sleep provider and surgeon to determine if it is the best solution for you.   More information  Surgery for CARRINGTON is directed at areas that are responsible for narrowing or complete obstruction of the airway during sleep.  There are a wide range of procedures available to enlarge and/or stabilize the airway to prevent blockage of breathing in the three major areas where it can occur: the palate, tongue, and nasal regions.  Successful surgical treatment depends on the accurate identification of the factors responsible for obstructive sleep apnea in each person.  A personalized approach is required because there is no single treatment that works well for everyone.  Because of anatomic variation, consultation with an examination by a sleep surgeon is a critical first step in determining what surgical options are best for each patient.  In some cases, examination during sedation may be recommended in order to guide the selection of procedures.  Patients will be counseled about risks and  benefits as well as the typical recovery course after surgery. Surgery is typically not a cure for a person s CARRINGTON.  However, surgery will often significantly improve one s CARRINGTON severity (termed  success rate ).  Even in the absence of a cure, surgery will decrease the cardiovascular risk associated with OSA7; improve overall quality of life8 (sleepiness, functionality, sleep quality, etc).      Palate Procedures:  Patients with CARRINGTON often have narrowing of their airway in the region of their tonsils and uvula.  The goals of palate procedures are to widen the airway in this region as well as to help the tissues resist collapse.  Modern palate procedure techniques focus on tissue conservation and soft tissue rearrangement, rather than tissue removal.  Often the uvula is preserved in this procedure. Residual sleep apnea is common in patient after pharyngoplasty with an average reduction in sleep apnea events of 33%2.      Tongue Procedures:  ExamWhile patients are awake, the muscles that surround the throat are active and keep this region open for breathing. These muscles relax during sleep, allowing the tongue and other structures to collapse and block breathing.  There are several different tongue procedures available.  Selection of a tongue base procedure depends on characteristics seen on physical exam.  Generally, procedures are aimed at removing bulky tissues in this area or preventing the back of the tongue from falling back during sleep.  Success rates for tongue surgery range from 50-62%3.    Hypoglossal Nerve Stimulation:  Hypoglossal nerve stimulation has recently received approval from the [place] Food and Drug Administration for the treatment of obstructive sleep apnea.  This is based on research showing that the system was safe and effective in treating sleep apnea6.  Results showed that the median AHI score decreased 68%, from 29.3 to 9.0. This therapy uses an implant system that senses breathing  patterns and delivers mild stimulation to airway muscles, which keeps the airway open during sleep.  The system consists of three fully implanted components: a small generator (similar in size to a pacemaker), a breathing sensor, and a stimulation lead.  Using a small handheld remote, a patient turns the therapy on before bed and off upon awakening.    Candidates for this device must be greater than 18 years of age, have moderate to severe CARRINGTON (AHI between 15-65), BMI less than 35, have tried CPAP/oral appliance for at least 8 weeks without success, and have appropriate upper airway anatomy (determined by a sleep endoscopy performed by Dr. Horacio Burris).    Hypoglossal Nerve Stimulation Pathway:    The sleep surgeon s office will work with the patient through the insurance prior-authorization process (including communications and appeals).    Nasal Procedures:  Nasal obstruction can interfere with nasal breathing during the day and night.  Studies have shown that relief of nasal obstruction can improve the ability of some patients to tolerate positive airway pressure therapy for obstructive sleep apnea1.  Treatment options include medications such as nasal saline, topical corticosteroid and antihistamine sprays, and oral medications such as antihistamines or decongestants. Non-surgical treatments can include external nasal dilators for selected patients. If these are not successful by themselves, surgery can improve the nasal airway either alone or in combination with these other options.      Combination Procedures:  Combination of surgical procedures and other treatments may be recommended, particularly if patients have more than one area of narrowing or persistent positional disease.  The success rate of combination surgery ranges from 66-80%2,3.    References  Chetan HATCH. The Role of the Nose in Snoring and Obstructive Sleep Apnoea: An Update.  Eur Arch Otorhinolaryngol. 2011; 268: 1365-73.   Lyla DELEON; Nishant  JA; Maci JR; Pallanch JF; Callum MB; Ventura SG; Renay JOE. Surgical modifications of the upper airway for obstructive sleep apnea in adults: a systematic review and meta-analysis. SLEEP 2010;33(10):8319-1257. Aracelis MCARTHUR. Hypopharyngeal surgery in obstructive sleep apnea: an evidence-based medicine review.  Arch Otolaryngol Head Neck Surg. 2006 Feb;132(2):206-13.  Nick YH1, Wilma Y, Juan YURIDIA. The efficacy of anatomically based multilevel surgery for obstructive sleep apnea. Otolaryngol Head Neck Surg. 2003 Oct;129(4):327-35.  Aracelis MCARTHUR, Goldberg A. Hypopharyngeal Surgery in Obstructive Sleep Apnea: An Evidence-Based Medicine Review. Arch Otolaryngol Head Neck Surg. 2006 Feb;132(2):206-13.  Mateo PJ et al. Upper-Airway Stimulation for Obstructive Sleep Apnea.  N Engl J Med. 2014 Jan 9;370(2):139-49.  Maureen Y et al. Increased Incidence of Cardiovascular Disease in Middle-aged Men with Obstructive Sleep Apnea. Am J Respir Crit Care Med; 2002 166: 159-165  Bright EM et al. Studying Life Effects and Effectiveness of Palatopharyngoplasty (SLEEP) study: Subjective Outcomes of Isolated Uvulopalatopharyngoplasty. Otolaryngol Head Neck Surg. 2011; 144: 623-631.        WHAT IF I ONLY HAVE SNORING?    Mandibular advancement devices, lateral sleep positioning, long-term weight loss and treatment of nasal allergies have been shown to improve snoring.  Exercising tongue muscles with a game (https://[employer]/us/clau/soundly-reduce-snoring/mt8485423607) or stimulating the tongue during the day with a device (https://doi.org/10.3390/wmb40931407) have improved snoring in some individuals.    Remember to Drive Safe... Drive Alive     Sleep health profoundly affects your health, mood, and your safety.  Thirty three percent of the population (one in three of us) is not getting enough sleep and many have a sleep disorder. Not getting enough sleep or having an untreated / undertreated sleep condition may make us sleepy without  even knowing it. In fact, our driving could be dramatically impaired due to our sleep health. As your provider, here are some things I would like you to know about driving:     Here are some warning signs for impairment and dangerous drowsy driving:              -Having been awake more than 16 hours               -Looking tired               -Eyelid drooping              -Head nodding (it could be too late at this point)              -Driving for more than 30 minutes     Some things you could do to make the driving safer if you are experiencing some drowsiness:              -Stop driving and rest              -Call for transportation              -Make sure your sleep disorder is adequately treated     Some things that have been shown NOT to work when experiencing drowsiness while driving:              -Turning on the radio              -Opening windows              -Eating any  distracting  /  entertaining  foods (e.g., sunflower seeds, candy, or any other)              -Talking on the phone      Your decision may not only impact your life, but also the life of others. Please, remember to drive safe for yourself and all of us.    [place] [facility]      Treating Insomnia  Good sleeping habits are a key part of treatment. If needed, some medications may help you sleep better at first. Making healthy lifestyle changes and learning to relax can improve your sleep. Treating insomnia takes commitment, but trust that your efforts will pay off. Talk to your doctor before taking any medication.    Healthy Lifestyle  Your lifestyle affects your health and your sleep. Here are some healthy habits:  Keep a regular sleep schedule. Go to bed and get up at the same time each day.  Exercise regularly. It may help you reduce stress. Avoid strenuous exercise for two to four hours before bedtime.  Avoid or limit naps.  Use your bed only for sleep and sex.  Don t spend too much time in bed trying to fall asleep. If you can t  fall asleep, get up and do something until you become tired and drowsy.  Avoid or limit caffeine and nicotine. They can keep you awake at night. Also avoid alcohol. It may help you fall asleep at first, but your sleep will not be restful.    Before Bedtime  To sleep better every night, try these tips:  Have a bedtime routine to let your body and mind know when it s time to sleep.  Going to bed should be relaxing so try to do only relaxing things around bedtime. Sleep will come sooner.  If your worries don t let you sleep, write them down in a diary. Then close it, and go to bed.  Make sure the room is not too hot or too cold. If it s not dark enough, an eye mask can help. If it s noisy, try using earplugs.    Learn to Relax  Stress, anxiety, and body tension may keep you awake at night. To unwind before bedtime, try reading a book, meditation, or yoga. Also, try the following:  Deep breathing. Sit or lie back in a chair. Take a slow, deep breath. Hold it for 5 counts. Then breathe out slowly through your mouth. Keep doing this until you feel relaxed.  Imagery. Think of the last fun trip you took. In your mind, walk through the trip from start to finish. Put as much detail into the memory as you can remember. It will help you relax.    Cognitive Behavioral Treatment (CBT) (PLEASE SEE BELOW INFORMATION REGARDING ON LINE CBT )  CBT is the most effective treatment for long-term insomnia. It tries to address the underlying causes of your sleep problems, including your habits and how you think about sleep.        Suggested Resources  Insomnia Treatment Books   Overcoming Insomnia by Maximiliano Schaefer and Brigid Gao (2008)  No More Sleepless Nights by Juan A Mack and Oanh Hernandez (1996)  Say Bernadine to Insomnia by Jamal Bledsoe (2009)  The Insomnia Workbook by Latasha Silva and Yves Randle (2009)  The Insomnia Answer by Blaise Wolfe and Jj Ferrer (2006)      Stress Management and Relaxation  Books  The Relaxation and Stress Reduction Workbook by Meri Bell, Pamela Rivas and Justin Figueroa (2008)  Stress Management Workbook: Techniques and Self-Assessment Procedures by Shaina Grider and Mahesh Campos (1997)  A Mindfulness-Based Stress Reduction Workbook by Ean Knox and Maegan Tineo (2010)  The Complete Stress Management Workbook by Aaron Clemente, Clayton Reddy and Deniz Farah (1996)  Assert Yourself by Cat Bonilla and Jason Bonilla (1977)    Relaxation Resources for Computer Download   These websites offer resources to help you relax. This list is for information only. [facility] is not responsible for the quality of services or the actions of any person or organization.  Progressive Muscle Relaxation (PMR):   http://www.[employer]/progressive-muscle-relaxation-exercise.html   http://studentsupport.[facility]/counseling/resources/[email or web address]/relaxation-and-stress-management/   Deep Breathing Exercises:  http://www.[employer]/breathing-awareness.html     Meditation:   www.[employer]  www.EagerPandaguidedArthroCADmeditation-site.com You may have to pay for some of these resources.    Guided Imagery:  http://www.[employer]/guided-imagery-scripts.html   http://[employer]/library/ueboutmjlx-lopcbw-lwmxosc/     Counseling / Behavioral Health  [facility]  Visit [email or web address] or call [phone] to find a clinic close to you.      This is not a prescription and these resources are optional. You must pay for any costs when using these resources. Please ask your insurance carrier if you can be reimbursed for these resources. If so, you are responsible for sending the needed details to your insurance carrier. These resources may also be tax deductible as medical expenses. Check with your .     These programs and publications are not affiliated in any way with [facility].        Insomnia and  Behavioral Sleep Medicine Program    The [facility] Insomnia and Behavioral Sleep Medicine Program provides non-drug treatment for sleep problems including:    Cognitive-behavioral Therapies for Insomnia (CBT-I)  Management of Shift-work and Jet Lag  Management of Delayed, Advanced and Irregular Circadian Rhythm Sleep Disorders  Imagery Rehearsal Therapy (IRT) for Nightmare Disorder  PAP Therapy Desensitization    You have been referred for consultation with a sleep psychologist who specializes in behavioral sleep medicine and treatment of insomnia.  The [facility] Insomnia and Behavioral Sleep Medicine Program offers individualized telehealth services through our [facility] and online CBT-I.    Preparing for your Consultation    You will need to keep a Sleep Diary for at least a week prior to your visit. Complete the sleep diary each day first thing after you get up by answering a few key questions about your sleep using our convenient mobile antonio or paper sleep diary.  Your answers should be based on your recall of the past 24 hours.  Avoid watching the clock or recording data during the night.     Insomnia  Antonio    The Insomnia  mobile antonio  is a convenient way to keep track of your sleep prior to your sleep consultation.  Simply download the free antonio on your Apple or Android phone and record your information each morning.  The antonio includes training, self-assessment, and sleep schedule recommendations.  Prior to your consultation we recommend you use only the sleep diary function. You can e-mail yourself a copy of your sleep diary data by going to the Settings section and using the [place] User Data function.  During your consultation your provider will review the data with you.          [facility] Sleep Diary    You can also track your sleep using the [facility] paper sleep diary.  You can upload your sleep diary and send it via a ShotClip message, fax it to  [phone], or have it with you at the time of your consultation.            CBT-I:  Frequently Asked Questions    What is CBT-I?    Cognitive Behavioral Therapy for Insomnia, also known as CBT-I, is a highly effective non-drug treatment for insomnia. The American College of Physicians recommends CBT-I as the first treatment for chronic insomnia.  Research has shown CBT-I to be safer and more effective long term than sleeping pills.    What does CBT-I involve?     CBT-I targets behaviors that lead to chronic insomnia:  Habits that weaken the bed as a cue for sleep  Habits that weaken your body's sleep drive and sleep/wake clock   Unhelpful sleep thoughts that increase sleep-related worry and arousal.    The process involves 3-6 telehealth visits that guide you to implement proven strategies to get a better night's sleep.    People often see improvement in their sleep within a few weeks. Research shows if you keep practicing the skills you learn your sleep is likely to continue to improve 6-12 months after treatment.    Does this program prescribe or manage sleep medication?    No.  Your prescribing provider is responsible to assist you in managing your sleep medications.  Some people choose to stop using sleep medication prior to or during CBT-I.  Our program can work with your prescribing provider to help reduce or eliminate use of sleep medications.     Getting Started Today!    If you haven't already done so, we recommend you consider making the following changes to your sleep habits prior to your sleep consultation:     Reduce your consumption of caffeine and alcohol.  Both can disrupt sleep and make strengthening your sleep more difficult.  Specifically:    - Avoid caffeine within 6 hours of bedtime   - No more than 3 caffeinated beverages per day (e.g. 8 oz. cup coffee or 12 oz. cup soda)            - No alcohol within 3 hours of bedtime    Make sure your bedroom is quiet, comfortable and dark.  Noise, light  and an uncomfortable sleep space can harm your sleep.      Keep the same sleep schedule 7 days a week.unless you do shift work.      Online CBT-I     If you want to get started today, research indicates that online CBT-I can be effective for some individuals. These programs requires comfort with clau-based or online learning.  However, digital CBT-I programs are not for everyone.  Contraindications include:    Seizure disorders,   Bipolar disorder,   Unstable medical or mental health conditions,   Frailty or risk of falling  Pregnancy    You should consult a sleep specialist before using these resources if you have:    Sleep Apnea  Restless Leg Syndrome  Sleep Walking  REM behavior disorder  Night Terrors  Excessive Daytime Sleepiness  Are engaged in shift work  Use prescription sleep medication    Our Online CBT-I program    If your sleep provider recommends online CBT-I for you , the cost for an entire 6-week program is $40.    To get started, copy and paste the link below which will take you to the landing page to register:                           www.[facility] [place]/[employer]               If you wish to complete the online CBT-I program but do not plan to follow-up with a sleep provider, you are set to begin the program.    If you are planning to work with an [facility] sleep provider, there are a couple of extra steps you can take to share your sleep data with your sleep provider.  To share sleep log data, go to the left side navigation and click on the  share sleep log  button:         You will be taken to the page below where you will enter  the provider code [facility] into the box.          Once you press the locate button, the information for [facility] will pop up as below.  By pressing the Submit button your data will be sent to our  secure [facility] sleep program portal and is available for review by your provider. You will only need to do this step once.                                   Self-help Workbooks for Insomnia    If you have found self-help books useful in the past, you may want to consider reading one of the following books prior to your consultation:    Say Bernadine to Insomnia: The Six-Week, Drug-Free Program Developed at [facility].  Jamal Bledsoe MD. Available in paperback, Darrius, and audiobook.    Overcoming Insomnia: A Cognitive-Behavioral Therapy Approach, Workbook.  Maximiliano Schaefer, PhD  and Brigid Gao, PhD.  Available in paperback and Darrius.    Quiet Your Mind and Get to Sleep: Solutions to Insomnia for Those with Depression, Anxiety, or Chronic Pain.  Lourdes Leone, PhD and Brigid Gao, PhD.  Available in paperback and Darrius

## 2023-10-18 NOTE — PROGRESS NOTES
Virtual Visit Details    Type of service:  Video Visit     Originating Location (pt. Location): Home    Distant Location (provider location):  Off-site  Platform used for Video Visit: [facility]    Outpatient Sleep Medicine Consultation:      Name: Lizzie Oconnell MRN# [id number]   Age: 33 year old YOB: 1990     Date of Consultation: October 18, 2023  Consultation is requested by: Rupal Norwood, RED  [address] [address] Rupal Norwood  Primary care provider: Elisabeth Padilla       Reason for Sleep Consult:     Lizzie Oconnell is sent by Rupal Norwood for a sleep consultation regarding obtaining evaluation for possible sleep apnea.    Patient s Reason for visit  Lizzie Oconnell main reason for visit: Snoring, waking up gasping for air  Patient states problem(s) started: Several years ago  Lizzie Oconnell's goals for this visit: Figure out what is causing the snoring and trouble breathing           Assessment and Plan:   Snoring, observed apneas, non restorative sleep, fatigue, EDS. Possible Obstructive sleep apnea  STOP BANG score:3/8 (snoring, tiredness, observed apneas during sleep)  HST/ Polysomnography reviewed.  Due to low STOP-BANG score, I recommended polysomnography to evaluate for sleep disordered breathing. Patient was interested in PSG but wants to pursue the sleep study that  is covered by her insurance. Orders generated in epic for  PSG and HST.   Recommend obtaining in-lab sleep study and if insurance does not cover the in lab study, we will schedule home sleep study.    Obstructive sleep apnea and consequences of untreated sleep apnea were reviewed.  Information provided regarding treatment options for CARRINGTON.    Chronic insomnia: Multifactorial-psychophysiological, delayed sleep phase, anxiety, OCD(pt reported), inadequate sleep hygiene, possible untreated CARRINGTON  We discussed stimulus control measures.  Sleep hygiene:  "Encouraged to follow good sleep hygiene measures including avoiding using phone or other electronic devices in bed and avoiding naps during the day.  Psychophysiologic insomnia: Information regarding  resources available through [place] insomnia program as well as online CBT-I were provided as summary.  Anxiety/Depression/OCD: We discussed the association of insomnia with anxiety,depression and OCD. Recommended the patient to continue follow-up with psychiatrist and therapist for optimizing the management of anxiety, depression and OCD.  Possible delayed sleep phase: We arrived at a goal bedtime of 11 PM and a goal wake up time of 7 AM. Recommended to follow regular sleep schedule, optimizing the duration and circadian timing of sleep and aim at obtaining at least 7 to 8 hours per night and avoid sleep deprivation  Possible CARRINGTON: Will obtain sleep study as documented above.    We discussed weight management with healthy diet, and exercise.        Patient was strongly advised to avoid driving, operating any heavy machinery or other hazardous situations while drowsy or sleepy.  Patient was counseled on the importance of driving while alert, to pull over if drowsy, or nap before getting into the vehicle if sleepy.     Plan is to communicate the results of the sleep study with the patient via [facility].    Orders Placed This Encounter   Procedures    Comprehensive Sleep Study    HST-Home Sleep Apnea Test - Noxturnal Returnable       Summary Counseling:    Sleep Testing Reviewed  Obstructive Sleep Apnea Reviewed  Complications of Untreated Sleep Apnea Reviewed      Medical Decision-making:   Educational materials provided in instructions    CC: Rupal Norwood     The above note was dictated using voice recognition software. Although reviewed after completion, some word and grammatical error may remain . Please contact the author for any clarifications.       \" Total time spent was 56 minutes for this " "appointment on this date of service which include time spent before, during and after the visit for chart review, patient care, counseling and coordination of care including documentation.\"      Gerald Vu MD  [facility]  [address] , [place] [address]             History of Present Illness:     Past Sleep Evaluations: none    SLEEP-WAKE SCHEDULE:     Work/School Days: Patient goes to school/work: No   Usually gets into bed at 11pm  Takes patient about 1-2 hrs to fall asleep  Has trouble falling asleep Every night Active mind affect sleep. OCD and anxiety that is being treated and controlled compared to before.   Wakes up in the middle of the night 2-3 times   Wakes up due to Snorting self awake;Nightmares  She has trouble falling back asleep Most nights   It usually takes 15 minutes maybe to get back to sleep  Patient is usually up at 7 am  Uses alarm: No    Weekends/Non-work Days/All Other Days:  Usually gets into bed at 11pm   Takes patient about 1-2 hrs to fall asleep  Patient is usually up at 7am  Uses alarm: No    She reports non- restorative sleep, fatigue and EDS.  Sleep Need  Patient gets  6 hours sleep on average   Patient thinks she needs about 8 hrs sleep    Lizzie Oconnell prefers to sleep in this position(s): Side;Head Elevated   Patient states they do the following activities in bed: Use phone, computer, or tablet    Naps  Patient takes a purposeful nap As many days as possibly allowes times a week and naps are usually 1-2 hrs in duration  She feels better after a nap: No  She dozes off unintentionally None days per week  Patient has had a driving accident or near-miss due to sleepiness/drowsiness: No      SLEEP DISRUPTIONS:    Breathing/Snoring  Patient snores:Yes  Other people complain about her snoring: Yes  Patient has been told she stops breathing in her sleep:Yes  Gasping/choking: Yes  She has issues with the following: Morning " headaches;Morning mouth dryness;Stuffy nose when you wake up;Heartburn or reflux at night    Movement:  Patient gets pain, discomfort, with an urge to move:  No  It happens when she is resting:  No  It happens more at night:  No  Patient has been told she kicks her legs at night:  No     Behaviors in Sleep:  Lizzie Oconnell has experienced the following behaviors while sleeping: Recurring Nightmares;Waking up paralyzed  Nightmares sometimes related to PTSD, and they are not as frequent as before  She used to experience sleep paralysis in the past , but there have not been any episodes of sleep paralysis in a very long time  She denies sleepwalking, sleep eating, sleep talking or dream enactment behavior  She has experienced sudden muscle weakness during the day: No      Is there anything else you would like your sleep provider to know: No      CAFFEINE AND OTHER SUBSTANCES:    Patient consumes caffeinated beverages per day:  2  Last caffeine use is usually: Afternoon  List of any prescribed or over the counter stimulants that patient takes:    List of any prescribed or over the counter sleep medication patient takes:    List of previous sleep medications that patient has tried:    Patient drinks alcohol to help them sleep: No  Patient drinks alcohol near bedtime: No    Family History:  Patient has a family member been diagnosed with a sleep disorder: Yes  Sleep Apnea Dad  uses CPAP         SCALES:    EPWORTH SLEEPINESS SCALE         10/17/2023     8:13 AM    [place] Sleepiness Scale ( HERNANDEZ Mclean  [id number]<br>ESS - [place]/English - Final version - 21 Nov 07 - [facility] [place].)   Sitting and reading High chance of dozing   Watching TV High chance of dozing   Sitting, inactive in a public place (e.g. a theatre or a meeting) Would never doze   As a passenger in a car for an hour without a break Moderate chance of dozing   Lying down to rest in the afternoon when circumstances permit High chance of dozing    Sitting and talking to someone Would never doze   Sitting quietly after a lunch without alcohol Would never doze   In a car, while stopped for a few minutes in traffic Would never doze   [place] Score (MC) 11   [place] Score (Sleep) 11         INSOMNIA SEVERITY INDEX (TODD)          10/17/2023     8:03 AM   Insomnia Severity Index (TODD)   Difficulty falling asleep 2   Difficulty staying asleep 2   Problems waking up too early 2   How SATISFIED/DISSATISFIED are you with your CURRENT sleep pattern? 3   How NOTICEABLE to others do you think your sleep problem is in terms of impairing the quality of your life? 2   How WORRIED/DISTRESSED are you about your current sleep problem? 3   To what extent do you consider your sleep problem to INTERFERE with your daily functioning (e.g. daytime fatigue, mood, ability to function at work/daily chores, concentration, memory, mood, etc.) CURRENTLY? 3   TODD Total Score 17       Guidelines for Scoring/Interpretation:  Total score categories:  0-7 = No clinically significant insomnia   8-14 = Subthreshold insomnia   15-21 = Clinical insomnia (moderate severity)  22-28 = Clinical insomnia (severe)  Used via courtesy of www.Zipmarkealth.va.gov with permission from Yves Randle PhD., [facility]        STOP BANG:3/8        10/18/2023    12:37 PM   STOP BANG Questionnaire (  2008, the American Society of Anesthesiologists, Inc. [employer]   BMI Clinic: 26.52         GAD7        1/25/2022     9:36 AM   TYREL-7    1. Feeling nervous, anxious, or on edge 3   2. Not being able to stop or control worrying 3   3. Worrying too much about different things 3   4. Trouble relaxing 3   5. Being so restless that it is hard to sit still 3   6. Becoming easily annoyed or irritable 3   7. Feeling afraid, as if something awful might happen 3   TYREL-7 Total Score 21         CAGE-AID         No data to display                CAGE-AID reprinted with permission from the [place]  "Medical Journal, DANIELLE Forrest. and SANDEE Rodriguez, \"Conjoint screening questionnaires for alcohol and drug abuse\" Formerly [facility] Journal 94: 135-140, 1995.      PATIENT HEALTH QUESTIONNAIRE-9 (PHQ - 9)        7/17/2023     3:43 PM   PHQ-9 ([employer])   1.  Little interest or pleasure in doing things 0   2.  Feeling down, depressed, or hopeless 0   3.  Trouble falling or staying asleep, or sleeping too much 3   4.  Feeling tired or having little energy 3   5.  Poor appetite or overeating 0   6.  Feeling bad about yourself - or that you are a failure or have let yourself or your family down 0   7.  Trouble concentrating on things, such as reading the newspaper or watching television 3   8.  Moving or speaking so slowly that other people could have noticed. Or the opposite - being so fidgety or restless that you have been moving around a lot more than usual 0   9.  Thoughts that you would be better off dead, or of hurting yourself in some way 0   PHQ-9 Total Score 9   6.  Feeling bad about yourself 0   7.  Trouble concentrating 3   8.  Moving slowly or restless 0   9.  Suicidal or self-harm thoughts 0   1.  Little interest or pleasure in doing things Not at all   2.  Feeling down, depressed, or hopeless Not at all   3.  Trouble falling or staying asleep, or sleeping too much Nearly every day   4.  Feeling tired or having little energy Nearly every day   5.  Poor appetite or overeating Not at all   6.  Feeling bad about yourself Not at all   7.  Trouble concentrating Nearly every day   8.  Moving slowly or restless Not at all   9.  Suicidal or self-harm thoughts Not at all   PHQ-9 via easy2comply ([employer]) TOTAL SCORE-----> 9 (Mild depression)   Difficulty at work, home, or with people Very difficult       Developed by Pedro Cason, Yessica Venegas, Arvin Lowery and colleagues, with an educational delmar from [employer] No permission required to reproduce, translate, display or distribute.        Allergies:    Allergies "   Allergen Reactions    Aminothiols     Amoxicillin Difficulty breathing    Compazine [Prochlorperazine]     Pcn [Penicillins]        Medications:    Current Outpatient Medications   Medication Sig Dispense Refill    ALPRAZolam (XANAX) 0.25 MG tablet Take 1 tablet (0.25 mg) by mouth 3 times daily as needed for anxiety 10 tablet 0    DULoxetine (CYMBALTA) 20 MG capsule TAKE 1 CAPSULE BY MOUTH EVERY DAY 30 capsule 1    fluticasone (FLONASE) 50 MCG/ACT nasal spray Spray 1-2 sprays into both nostrils daily 16 g 3    methylfolate (DEPLIN) 15 MG TABS tablet Take 7.5 mg by mouth daily     Patient reports that she takes Cymbalta 20 mg in the morning and 30 mg at night      Problem List:  Patient Active Problem List    Diagnosis Date Noted    Moderate episode of recurrent major depressive disorder (H) 2023     Priority: Medium    Tetanus, diphtheria, and acellular pertussis (Tdap) vaccination declined 2020     Priority: Medium    Migraines 2019     Priority: Medium     Overview:   premenstral      Dysmenorrhea 2019     Priority: Medium    History of prior pregnancy with SGA  2018     Priority: Medium    Panic attack 2018     Priority: Medium    TYREL (generalized anxiety disorder) 2018     Priority: Medium    Mild major depression (H) 2018     Priority: Medium    Pain in joint, multiple sites 2014     Priority: Medium        Past Medical/Surgical History:  Past Medical History:   Diagnosis Date    Anxiety 2018    Depressive disorder     Pregnancy      Past Surgical History:   Procedure Laterality Date     SECTION  2021    CHOLECYSTECTOMY      GALLBLADDER SURGERY         Social History:  Social History     Socioeconomic History    Marital status:      Spouse name: Not on file    Number of children: Not on file    Years of education: Not on file    Highest education level: Not on file   Occupational History    Not on file   Tobacco Use     "Smoking status: Never    Smokeless tobacco: Never   Vaping Use    Vaping Use: Never used   Substance and Sexual Activity    Alcohol use: No    Drug use: No    Sexual activity: Yes     Partners: Male     Birth control/protection: None     Comment: pregnant   Other Topics Concern    Parent/sibling w/ CABG, MI or angioplasty before 65F 55M? No   Social History Narrative    Not on file     Social Determinants of Health     Financial Resource Strain: Not on file   Food Insecurity: Not on file   Transportation Needs: Not on file   Physical Activity: Not on file   Stress: Not on file   Social Connections: Not on file   Interpersonal Safety: Not on file   Housing Stability: Not on file       Family History:  Family History   Problem Relation Age of Onset    Anxiety Disorder Mother     Depression Mother     Anxiety Disorder Brother     Depression Brother     Depression Brother        Review of Systems:  A complete review of systems reviewed by me is negative with the exeption of what has been mentioned in the history of present illness.  In the last TWO WEEKS have you experienced any of the following symptoms?  Fevers: No  Night Sweats: Yes  Weight Gain: No  Pain at Night: No  Double Vision: No  Changes in Vision: No  Difficulty Breathing through Nose: Yes  Sore Throat in Morning: Yes  Dry Mouth in the Morning: Yes  Shortness of Breath With Activity: Yes  Awakening with Shortness of Breath: Yes  Increased Cough: Yes  Heart Racing at Night: Yes  Swelling in Feet or Legs: No  Diarrhea at Night: No  Heartburn at Night: No  Urinating More than Once at Night: No  Losing Control of Urine at Night: No  Joint Pains at Night: No  Headaches in Morning: Yes  Weakness in Arms or Legs: No  Depressed Mood: Yes, denies suicidal ideations or thoughts of harming others   Anxiety: Yes     Physical Examination:  Vitals: Ht 1.575 m (5' 2\")   Wt 65.8 kg (145 lb)   BMI 26.52 kg/m    BMI= Body mass index is 26.52 kg/m .  General: No apparent " "distress, appropriately groomed  Head: Normocephalic, atraumatic  Neck:Circumference: 12 1/2 inches  Chest: No cough, no audible wheezing, able to talk in full sentences  Psych: coherent speech, normal rate and volume, able to articulate logical thoughts, able   to abstract reason, no tangential thoughts, no hallucinations   or delusions  Her affect is normal  Neuro:  Mental status: Alert and  Oriented X 3  Speech: normal               Data: All pertinent previous laboratory data reviewed     No results for input(s): \"NA\", \"POTASSIUM\", \"CHLORIDE\", \"CO2\", \"ANIONGAP\", \"GLC\", \"BUN\", \"CR\", \"FEDERICO\" in the last 02095 hours.    Recent Labs   Lab Test 07/17/23  1634   WBC 8.7   RBC 4.48   HGB 13.2   HCT 40.4   MCV 90   MCH 29.5   MCHC 32.7   RDW 13.0          No results for input(s): \"PROTTOTAL\", \"ALBUMIN\", \"BILITOTAL\", \"ALKPHOS\", \"AST\", \"ALT\", \"BILIDIRECT\" in the last 46186 hours.    TSH   Date Value   02/28/2018 2.00 mU/L   08/20/2014 2.744 mcU/mL       No results found for: \"UAMP\", \"UBARB\", \"BENZODIAZEUR\", \"UCANN\", \"UCOC\", \"OPIT\", \"UPCP\"    Iron Sat Index   Date/Time Value Ref Range Status   07/17/2023 04:34 PM 23 15 - 46 % Final     Ferritin   Date/Time Value Ref Range Status   07/17/2023 04:34 PM 37 6 - 175 ng/mL Final       No results found for: \"PH\", \"PHARTERIAL\", \"PO2\", \"GV5UMOESZWA\", \"SAT\", \"PCO2\", \"HCO3\", \"BASEEXCESS\", \"CARMEN\", \"BEB\"    Echocardiography: No results found for this or any previous visit (from the past 4320 hour(s)).    Chest x-ray: No results found for this or any previous visit from the past 365 days.      Chest CT: No results found for this or any previous visit from the past 365 days.      PFT: Most Recent B[place]ze Pulmonary Function Testing: No results found      Gerald Vu MD 10/18/2023     "

## 2023-10-19 ENCOUNTER — TELEPHONE (OUTPATIENT)
Dept: FAMILY MEDICINE | Facility: CLINIC | Age: 33
End: 2023-10-19

## 2023-10-19 NOTE — TELEPHONE ENCOUNTER
All Patches/Pads removed. Skin Intact. Phone call placed to pt regarding pain at progesterone injection. Pt reported appointment made for later today. Pt reports contractions have lessened. Educated on hydration  ounces to prevent cramping. Pt verbalized understanding. Sun Mccain RN on 10/10/2018 at 9:43 AM

## 2023-10-19 NOTE — TELEPHONE ENCOUNTER
General Call    Contacts         Type Contact Phone/Fax    10/19/2023 12:22 PM CDT Phone (Outgoing) Lizzie Oconnell (Self) [phone] (M)    Talked with Patient           Reason for Call:  Sleep study needing to be scheduled, shift worker noted on order. Pt was told to schedule both to see what insurance will cover, HST is scheduled, just needs the in lab scheduled now.     What are your questions or concerns:  na    Date of last appointment with provider: na    [facility] or would you prefer to receive a phone call?:   Patient would prefer a phone call   Okay to leave a detailed message?: Yes at Cell number on file:    Telephone Information:   Mobile [phone]

## 2023-11-07 NOTE — PROGRESS NOTES
ENT Consultation    Lizzie Oconnell who is a 33 year old female seen in consultation at the request of Jim Norwood PA-C.      History of Present Illness - Lizzie Oconnell is a 33 year old female presents for evaluation of sinus issues.  Apparently for the last 5 years she has had about 2-3 sinus infections annually usually in the fall through the winter.  The symptoms classic facial pressure yellowish discharge.  She snores loudly according to her .  She is a mouth breather in the mornings and her mouth is dry in the mornings.  He is always congested.  Flonase helps her a little bit.  She was noted to have some apnea by her  and feels very unrefreshed during the day.  She is already scheduled for home sleep apnea testing after consultation with sleep physician at [facility].  She was never allergy tested.  She also frequently clenches his teeth and gets right otalgia.  She also has experienced right tinnitus.  Questionable some hearing issues in the right not tested.  No vertigo no dizziness noted.  Sense of smell and taste appear intact.      BP Readings from Last 1 Encounters:   11/08/23 130/84       BP noted to be well controlled today in office.     Lizzie IS NOT a smoker/uses chewing tobacco.      Past Medical History -   Past Medical History:   Diagnosis Date    Anxiety 03/2018    Depressive disorder     Pregnancy        Current Medications -   Current Outpatient Medications:     ALPRAZolam (XANAX) 0.25 MG tablet, Take 1 tablet (0.25 mg) by mouth 3 times daily as needed for anxiety, Disp: 10 tablet, Rfl: 0    DULoxetine (CYMBALTA) 20 MG capsule, TAKE 1 CAPSULE BY MOUTH EVERY DAY, Disp: 30 capsule, Rfl: 1    fluticasone (FLONASE) 50 MCG/ACT nasal spray, Spray 1-2 sprays into both nostrils daily, Disp: 16 g, Rfl: 3    methylfolate (DEPLIN) 15 MG TABS tablet, Take 7.5 mg by mouth daily, Disp: , Rfl:     Allergies -   Allergies   Allergen Reactions    Aminothiols     Amoxicillin  "Difficulty breathing    Compazine [Prochlorperazine]     Pcn [Penicillins]        Social History -   Social History     Socioeconomic History    Marital status:    Tobacco Use    Smoking status: Never    Smokeless tobacco: Never   Vaping Use    Vaping Use: Never used   Substance and Sexual Activity    Alcohol use: No    Drug use: No    Sexual activity: Yes     Partners: Male     Birth control/protection: None     Comment: pregnant   Other Topics Concern    Parent/sibling w/ CABG, MI or angioplasty before 65F 55M? No       Family History -   Family History   Problem Relation Age of Onset    Anxiety Disorder Mother     Depression Mother     Anxiety Disorder Brother     Depression Brother     Depression Brother        Review of Systems - As per HPI and PMHx, otherwise review of system review of the head and neck negative. Otherwise 10+ review of system is negative    Physical Exam  /84   Temp 97.9  F (36.6  C) (Temporal)   Ht 1.594 m (5' 2.76\")   Wt 64.7 kg (142 lb 10.2 oz)   BMI 25.46 kg/m    BMI: Body mass index is 25.46 kg/m .    General - The patient is well nourished and well developed, and appears to have good nutritional status.  Alert and oriented to person and place, answers questions and cooperates with examination appropriately.    SKIN - No suspicious lesions or rashes.  Respiration - No respiratory distress.  Head and Face - Normocephalic and atraumatic, with no gross asymmetry noted of the contour of the facial features.  The facial nerve is intact, with strong symmetric movements.    Voice and Breathing - The patient was breathing comfortably without the use of accessory muscles. The patients voice was clear and strong, and had appropriate pitch and quality.    Ears - Bilateral pinna and EACs with normal appearing overlying skin. Tympanic membrane intact with good mobility on pneumatic otoscopy bilaterally. Bony landmarks of the ossicular chain are normal. The tympanic membranes are " normal in appearance. No retraction, perforation, or masses.  No fluid or purulence was seen in the external canal or the middle ear.     Eyes - Extraocular movements intact.  Sclera were not icteric or injected, conjunctiva were pink and moist.    Mouth - Examination of the oral cavity showed pink, healthy oral mucosa. No lesions or ulcerations noted.  The tongue was mobile and midline, and the dentition were in good condition.      Throat - The walls of the oropharynx were smooth, pink, moist, symmetric, and had no lesions or ulcerations.  The tonsillar pillars and soft palate were symmetric.  Tonsils 1-2+ in size without exudates without erythema.  The uvula was midline on elevation.    Neck - Normal midline excursion of the laryngotracheal complex during swallowing.  Full range of motion on passive movement.  Palpation of the occipital, submental, submandibular, internal jugular chain, and supraclavicular nodes did not demonstrate any abnormal lymph nodes or masses.  The carotid pulse was palpable bilaterally.  Palpation of the thyroid was soft and smooth, with no nodules or goiter appreciated.  The trachea was mobile and midline.    Nose - External contour is symmetric, no gross deflection or scars.  Nasal mucosa is pink and moist with no abnormal mucus.  The septum was slightly deviated but minimally obstructive, turbinates of enlarged size and position.  No polyps, masses, or purulence noted on examination.    Neuro - Nonfocal neuro exam is normal, CN 2 through 12 intact, normal gait and muscle tone.      Performed in clinic today:  No procedures preformed in clinic today      A/P - Lizzie Oconnell is a 33 year old female with symptoms of recurrent sinusitis mostly around full season which may be related to allergies.  Further allergy evaluation performed as I am referring the patient to see Dr. Mcintosh.  In regard to further evaluation of his sinuses we will get CT of the sinuses.  In the meantime patient  can continue using Flonase as well as nasal saline.  We will also evaluate patient after her home sleep apnea testing.      Philippe Akbar MD

## 2023-11-08 ENCOUNTER — OFFICE VISIT (OUTPATIENT)
Dept: OTOLARYNGOLOGY | Facility: OTHER | Age: 33
End: 2023-11-08
Payer: COMMERCIAL

## 2023-11-08 VITALS
SYSTOLIC BLOOD PRESSURE: 130 MMHG | BODY MASS INDEX: 25.27 KG/M2 | DIASTOLIC BLOOD PRESSURE: 84 MMHG | TEMPERATURE: 97.9 F | WEIGHT: 142.64 LBS | HEIGHT: 63 IN

## 2023-11-08 DIAGNOSIS — J32.9 CHRONIC SINUSITIS, UNSPECIFIED LOCATION: ICD-10-CM

## 2023-11-08 DIAGNOSIS — R09.81 NASAL CONGESTION: ICD-10-CM

## 2023-11-08 DIAGNOSIS — J30.89 SEASONAL ALLERGIC RHINITIS DUE TO OTHER ALLERGIC TRIGGER: Primary | ICD-10-CM

## 2023-11-08 PROCEDURE — 99204 OFFICE O/P NEW MOD 45 MIN: CPT | Performed by: OTOLARYNGOLOGY

## 2023-11-08 NOTE — LETTER
11/8/2023         RE: Lizzie Oconnell  [address] [facility] [address]        Dear Colleague,    Thank you for referring your patient, Lizzie Oconnell, to the [facility]. Please see a copy of my visit note below.    ENT Consultation    Lizzie Oconnell who is a 33 year old female seen in consultation at the request of Jim Norwood PA-C.      History of Present Illness - Lizzie Oconnell is a 33 year old female presents for evaluation of sinus issues.  Apparently for the last 5 years she has had about 2-3 sinus infections annually usually in the fall through the winter.  The symptoms classic facial pressure yellowish discharge.  She snores loudly according to her .  She is a mouth breather in the mornings and her mouth is dry in the mornings.  He is always congested.  Flonase helps her a little bit.  She was noted to have some apnea by her  and feels very unrefreshed during the day.  She is already scheduled for home sleep apnea testing after consultation with sleep physician at [facility].  She was never allergy tested.  She also frequently clenches his teeth and gets right otalgia.  She also has experienced right tinnitus.  Questionable some hearing issues in the right not tested.  No vertigo no dizziness noted.  Sense of smell and taste appear intact.      BP Readings from Last 1 Encounters:   11/08/23 130/84       BP noted to be well controlled today in office.     Lizzie IS NOT a smoker/uses chewing tobacco.      Past Medical History -   Past Medical History:   Diagnosis Date     Anxiety 03/2018     Depressive disorder      Pregnancy        Current Medications -   Current Outpatient Medications:      ALPRAZolam (XANAX) 0.25 MG tablet, Take 1 tablet (0.25 mg) by mouth 3 times daily as needed for anxiety, Disp: 10 tablet, Rfl: 0     DULoxetine (CYMBALTA) 20 MG capsule, TAKE 1 CAPSULE BY MOUTH EVERY DAY, Disp: 30 capsule, Rfl: 1     fluticasone (FLONASE) 50  "MCG/ACT nasal spray, Spray 1-2 sprays into both nostrils daily, Disp: 16 g, Rfl: 3     methylfolate (DEPLIN) 15 MG TABS tablet, Take 7.5 mg by mouth daily, Disp: , Rfl:     Allergies -   Allergies   Allergen Reactions     Aminothiols      Amoxicillin Difficulty breathing     Compazine [Prochlorperazine]      Pcn [Penicillins]        Social History -   Social History     Socioeconomic History     Marital status:    Tobacco Use     Smoking status: Never     Smokeless tobacco: Never   Vaping Use     Vaping Use: Never used   Substance and Sexual Activity     Alcohol use: No     Drug use: No     Sexual activity: Yes     Partners: Male     Birth control/protection: None     Comment: pregnant   Other Topics Concern     Parent/sibling w/ CABG, MI or angioplasty before 65F 55M? No       Family History -   Family History   Problem Relation Age of Onset     Anxiety Disorder Mother      Depression Mother      Anxiety Disorder Brother      Depression Brother      Depression Brother        Review of Systems - As per HPI and PMHx, otherwise review of system review of the head and neck negative. Otherwise 10+ review of system is negative    Physical Exam  /84   Temp 97.9  F (36.6  C) (Temporal)   Ht 1.594 m (5' 2.76\")   Wt 64.7 kg (142 lb 10.2 oz)   BMI 25.46 kg/m    BMI: Body mass index is 25.46 kg/m .    General - The patient is well nourished and well developed, and appears to have good nutritional status.  Alert and oriented to person and place, answers questions and cooperates with examination appropriately.    SKIN - No suspicious lesions or rashes.  Respiration - No respiratory distress.  Head and Face - Normocephalic and atraumatic, with no gross asymmetry noted of the contour of the facial features.  The facial nerve is intact, with strong symmetric movements.    Voice and Breathing - The patient was breathing comfortably without the use of accessory muscles. The patients voice was clear and strong, and " had appropriate pitch and quality.    Ears - Bilateral pinna and EACs with normal appearing overlying skin. Tympanic membrane intact with good mobility on pneumatic otoscopy bilaterally. Bony landmarks of the ossicular chain are normal. The tympanic membranes are normal in appearance. No retraction, perforation, or masses.  No fluid or purulence was seen in the external canal or the middle ear.     Eyes - Extraocular movements intact.  Sclera were not icteric or injected, conjunctiva were pink and moist.    Mouth - Examination of the oral cavity showed pink, healthy oral mucosa. No lesions or ulcerations noted.  The tongue was mobile and midline, and the dentition were in good condition.      Throat - The walls of the oropharynx were smooth, pink, moist, symmetric, and had no lesions or ulcerations.  The tonsillar pillars and soft palate were symmetric.  Tonsils 1-2+ in size without exudates without erythema.  The uvula was midline on elevation.    Neck - Normal midline excursion of the laryngotracheal complex during swallowing.  Full range of motion on passive movement.  Palpation of the occipital, submental, submandibular, internal jugular chain, and supraclavicular nodes did not demonstrate any abnormal lymph nodes or masses.  The carotid pulse was palpable bilaterally.  Palpation of the thyroid was soft and smooth, with no nodules or goiter appreciated.  The trachea was mobile and midline.    Nose - External contour is symmetric, no gross deflection or scars.  Nasal mucosa is pink and moist with no abnormal mucus.  The septum was slightly deviated but minimally obstructive, turbinates of enlarged size and position.  No polyps, masses, or purulence noted on examination.    Neuro - Nonfocal neuro exam is normal, CN 2 through 12 intact, normal gait and muscle tone.      Performed in clinic today:  No procedures preformed in clinic today      A/P - Lizzie Oconnell is a 33 year old female with symptoms of recurrent  sinusitis mostly around full season which may be related to allergies.  Further allergy evaluation performed as I am referring the patient to see Dr. Mcintosh.  In regard to further evaluation of his sinuses we will get CT of the sinuses.  In the meantime patient can continue using Flonase as well as nasal saline.  We will also evaluate patient after her home sleep apnea testing.      Philippe Akbar MD       Again, thank you for allowing me to participate in the care of your patient.        Sincerely,        Philippe Akbar MD, MD

## 2023-12-26 NOTE — NURSING NOTE
My Chart message sent to patient:  Berlin Samuel,    This is a quick message following up on your 10/18/2023 appointment with Dr. Diaz.  It looks like she ordered a sleep study and a follow-up appointment with her to go over the results of sleep study.     If this is something you would like to move forward in scheduling, please call us at [phone].      Thank you!    [facility]  Ruthy Anderson CMA

## 2024-01-03 ENCOUNTER — OFFICE VISIT (OUTPATIENT)
Dept: ALLERGY | Facility: OTHER | Age: 34
End: 2024-01-03
Payer: COMMERCIAL

## 2024-01-03 VITALS
SYSTOLIC BLOOD PRESSURE: 125 MMHG | DIASTOLIC BLOOD PRESSURE: 87 MMHG | BODY MASS INDEX: 24.99 KG/M2 | RESPIRATION RATE: 16 BRPM | OXYGEN SATURATION: 95 % | HEART RATE: 95 BPM | WEIGHT: 140 LBS

## 2024-01-03 DIAGNOSIS — T78.1XXA REACTION TO FOOD, INITIAL ENCOUNTER: ICD-10-CM

## 2024-01-03 DIAGNOSIS — J32.9 RECURRENT SINUS INFECTIONS: ICD-10-CM

## 2024-01-03 DIAGNOSIS — J31.0 CHRONIC RHINITIS: Primary | ICD-10-CM

## 2024-01-03 PROCEDURE — 95004 PERQ TESTS W/ALRGNC XTRCS: CPT | Performed by: ALLERGY & IMMUNOLOGY

## 2024-01-03 PROCEDURE — 99203 OFFICE O/P NEW LOW 30 MIN: CPT | Mod: 25 | Performed by: ALLERGY & IMMUNOLOGY

## 2024-01-03 RX ORDER — AZELASTINE 1 MG/ML
2 SPRAY, METERED NASAL 2 TIMES DAILY PRN
Qty: 30 ML | Refills: 3 | Status: SHIPPED | OUTPATIENT
Start: 2024-01-03

## 2024-01-03 ASSESSMENT — ENCOUNTER SYMPTOMS
VOMITING: 0
RHINORRHEA: 0
FATIGUE: 0
CHILLS: 0
DIARRHEA: 0
SHORTNESS OF BREATH: 0
COUGH: 1
CONSTIPATION: 0
ABDOMINAL PAIN: 0
SORE THROAT: 0
FEVER: 0
WHEEZING: 0
EYE ITCHING: 1
EYE DISCHARGE: 0
LIGHT-HEADEDNESS: 0
CHEST TIGHTNESS: 0
NAUSEA: 0
JOINT SWELLING: 0
SINUS PRESSURE: 1
HEADACHES: 0
ACTIVITY CHANGE: 0
DIZZINESS: 0
EYE REDNESS: 0

## 2024-01-03 ASSESSMENT — PAIN SCALES - GENERAL: PAINLEVEL: NO PAIN (0)

## 2024-01-03 NOTE — PROGRESS NOTES
SUBJECTIVE:                                                                 Lizzie Oconnell presents today to our Allergy Clinic at [facility] for a new patient visit. She is a 33 year old female with concerns about possible environmental/seasonal allergies.      Question 1/3/2024  6:18 AM CST - Filed by Patient   Have you had close contact with someone who has traveled outside the country in the past 30 days and is sick? No     [facility]    Question 1/3/2024  6:24 AM CST - Filed by Patient   Reason for visit: Nasal or sinus symptoms   Nasal/ Sinus/ Eye Symptoms    When did your symptoms begin? As long as i can remember   What symptoms do you have? Itchy eyes    Watery eyes    Stuffy nose    Sinus pressure    Drainage down the throat    Ear pressure    Headaches   Any prior sinus surgeries? No   History of nasal polyps? Yes, was told so by primary, but ENT said no polyps   History of sinus infections? Yes   How many in the past year? Several, lost track   Have you tried pills/oral medications? Yes   Which ones? Psudaphed and antibiotics   Have you tried nasal sprays? Yes   Which ones? Flonase and other over the counter sprays. Partially helpful.   Have you used eye drops? Yes   Which ones?    When do symptoms occur? Year-round, worse with changes of seasons    Indoors    Outdoors   What makes symptoms worse? Animals    Dust    Temperature changes    Scents/perfumes   Environmental and Social History    Place of Residence: House   Do you have Central Air Conditioning? 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 No   Does anyone living in your home smoke or vape? No   Family History    Do your parents, siblings, or children have asthma? Yes   Who? 2 of 5 children diagnosed so far   Do your parents, siblings, or children have environmental  allergies? Yes   Who? 3 of 5 children disahnosed so far   Do your parents, siblings, or children have food allergies? No   Do your parents, siblings, or children have eczema? Yes   Who? 3 of 5 children diagnosed so far       When she develops rhinoconjunctivitis, she also develops itchy mouth. Oral antihistamines are partially helpful. Oral antihistamines in the past were somewhat helpful. She has not taken antihistamines in the past 7 days in anticipation of allergy testing.   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 In the past, when she ate peanuts or tree nuts, she would develop an itchy/burning tongue. It could last from minutes, up to hours, up to a whole day. She avoids peanuts and tree nuts for that reason. On the other hand, she also develops an itchy mouth when she doesn't eat anything.        Patient Active Problem List   Diagnosis    Pain in joint, multiple sites    TYREL (generalized anxiety disorder)    Mild major depression (H)    Panic attack    History of prior pregnancy with SGA     Migraines    Dysmenorrhea    Tetanus, diphtheria, and acellular pertussis (Tdap) vaccination declined    Moderate episode of recurrent major depressive disorder (H)       Past Medical History:   Diagnosis Date    Anxiety 2018    Depressive disorder     Pregnancy       Problem (# of Occurrences) Relation (Name,Age of Onset)    Anxiety Disorder (2) Mother, Brother    Depression (3) Mother, Brother, Brother (Brother)          Past Surgical History:   Procedure Laterality Date     SECTION  2021    CHOLECYSTECTOMY      GALLBLADDER SURGERY       Social History     Socioeconomic History    Marital status:      Spouse name: None    Number of children: None    Years of education: None    Highest education level: None   Tobacco Use    Smoking status: Never    Smokeless tobacco: Never   Vaping Use    Vaping Use: Never used   Substance and Sexual Activity    Alcohol use: No    Drug use: No    Sexual activity:  Yes     Partners: Male     Birth control/protection: None     Comment: pregnant   Other Topics Concern    Parent/sibling w/ CABG, MI or angioplasty before 65F 55M? No   Social History Narrative    ENVIRONMENTAL HISTORY: [place] home is heated with a forced air. They does have central air conditioning. The patient's bedroom is furnished with carpeting in bedroom.  Pets inside the house include 0. There is no history of cockroach or mice infestation. There is/are 0 smokers in the house.  The house does have a damp basement.             Review of Systems   Constitutional:  Negative for activity change, chills, fatigue and fever.   HENT:  Positive for congestion, postnasal drip and sinus pressure. Negative for nosebleeds, rhinorrhea, sneezing and sore throat.    Eyes:  Positive for itching. Negative for discharge and redness.   Respiratory:  Positive for cough. Negative for chest tightness, shortness of breath and wheezing.    Cardiovascular:  Negative for chest pain.   Gastrointestinal:  Negative for abdominal pain, constipation, diarrhea, nausea and vomiting.   Musculoskeletal:  Negative for joint swelling.   Skin:  Negative for rash.   Neurological:  Negative for dizziness, light-headedness and headaches.           Current Outpatient Medications:     azelastine (ASTELIN) 0.1 % nasal spray, Spray 2 sprays into both nostrils 2 times daily as needed for rhinitis, Disp: 30 mL, Rfl: 3    ALPRAZolam (XANAX) 0.25 MG tablet, Take 1 tablet (0.25 mg) by mouth 3 times daily as needed for anxiety, Disp: 10 tablet, Rfl: 0    DULoxetine (CYMBALTA) 20 MG capsule, TAKE 1 CAPSULE BY MOUTH EVERY DAY, Disp: 30 capsule, Rfl: 1    fluticasone (FLONASE) 50 MCG/ACT nasal spray, Spray 1-2 sprays into both nostrils daily, Disp: 16 g, Rfl: 3    methylfolate (DEPLIN) 15 MG TABS tablet, Take 7.5 mg by mouth daily, Disp: , Rfl:   Immunization History   Administered Date(s) Administered    HIB  (PRP-T) 1992, 1992    HepB 08/06/2004    Historical DTP/aP 1992, 1992, 02/12/1999    MMR 09/02/1993, 10/11/2001    OPV, trivalent, live 1992, 1992    Poliovirus, inactivated (IPV) 10/11/2001, 12/13/2001    TD,PF 7+ (Tenivac) 02/12/1999    Varicella 08/28/2001     Allergies   Allergen Reactions    Aminothiols     Amoxicillin Difficulty breathing    Compazine [Prochlorperazine]     Pcn [Penicillins]      OBJECTIVE:                                                                 /87   Pulse 95   Resp 16   Wt 63.5 kg (140 lb)   SpO2 95%   BMI 24.99 kg/m          Physical Exam  Vitals and nursing note reviewed.   Constitutional:       General: She is not in acute distress.     Appearance: She is not ill-appearing, toxic-appearing or diaphoretic.   HENT:      Head: Normocephalic and atraumatic.      Right Ear: Tympanic membrane, ear canal and external ear normal.      Left Ear: Tympanic membrane, ear canal and external ear normal.      Nose: No mucosal edema, congestion or rhinorrhea.      Right Turbinates: Not enlarged, swollen or pale.      Left Turbinates: Not enlarged, swollen or pale.      Mouth/Throat:      Lips: Pink.      Mouth: Mucous membranes are moist.      Pharynx: Oropharynx is clear. No pharyngeal swelling, oropharyngeal exudate, posterior oropharyngeal erythema or uvula swelling.   Eyes:      General:         Right eye: No discharge.         Left eye: No discharge.      Conjunctiva/sclera: Conjunctivae normal.   Cardiovascular:      Rate and Rhythm: Normal rate and regular rhythm.      Heart sounds: Normal heart sounds. No murmur heard.  Pulmonary:      Effort: Pulmonary effort is normal. No respiratory distress.      Breath sounds: Normal breath sounds and air entry. No stridor, decreased air movement or transmitted upper airway sounds. No decreased breath sounds, wheezing, rhonchi or rales.   Musculoskeletal:         General: Normal range of motion.    Neurological:      Mental Status: She is alert and oriented to person, place, and time.   Psychiatric:         Mood and Affect: Mood normal.         Behavior: Behavior normal.         At today's visit, the patient and I engaged in an informed consent discussion about allergy testing.  We discussed skin testing, blood testing, and the alternative of not undergoing any testing. The patient has a preference for skin testing. We then discussed the risks and benefits of skin testing.  The patient understands skin testing risks can include, but are not limited to, urticaria, angioedema, shortness of breath, and severe anaphylaxis.  The benefits include, but are not limited, to evaluation for allergens causing symptoms.  After answering the patient's questions they have agreed to proceed with skin testing.    ENVIRONMENTAL PERCUTANEOUS SKIN TESTING: ADULT      1/3/2024     2:00 PM   [facility]   Consent Y   Ordering Physician Dayna   Interpreting Physician Dayna   Testing Technician Dona   Location Back   Time start: 15:00   Time End: 15:15   Positive Control: Histatrol*ALK 1 mg/ml 5/5   Negative Control: 50% Glycerin 0   Cat Hair*ALK (10,000 BAU/ml) 0   AP Dog Hair/Dander (1:100 w/v) 0   Dust Mite p. 30,000 AU/ml 0   Dust Mite f. (30,000 AU/ml) 0   Julio (W/F in millimeters) 0   Jeffrey Grass (100,000 BAU/mL) 0   Red Cedar (W/F in millimeters) 0   Maple/Renville (W/F in millimeters) 0   Hackberry (W/F in millimeters) 0   Benton (W/F in millimeters) 0   [place] *ALK (W/F in millimeters) 0   American Elm (W/F in millimeters) 0   [place] (W/F in millimeters) 0   Black [place] (W/F in millimeters) 0   Birch Mix (W/F in millimeters) 0   Leeper (W/F in millimeters) 0   Oak (W/F in millimeters) 0   Cocklebur (W/F in millimeters) 0   Lagro (W/F in millimeters) 0   White Armando (W/F in millimeters) 0   Careless (W/F in millimeters) 0   Nettle (W/F in millimeters) 0   English Plantain (W/F in millimeters)  0   Kochia (W/F in millimeters) 0   Lamb's Quarter (W/F in millimeters) 0   Marshelder (W/F in millimeters) 0   Ragweed Mix* ALK (W/F in millimeters) 0   Russian Thistle (W/F in millimeters) 0   Sagebrush/Mugwort (W/F in millimeters) 0   Sheep Sorrel (W/F in millimeters) 0   Feather Mix* ALK (W/F in millimeters) 0   Penicillium Mix (1:10 w/v) 0   Curvularia spicifera (1:10 w/v) 0   Epicoccum (1:10 w/v) 0   Aspergillus fumigatus (1:10 w/v): 0   Alternaria tenius (1:10 w/v) 0   H. Cladosporium (1:10 w/v) 0   Phoma herbarum (1:10 w/v) 0      FOOD ALLERGEN PERCUTANEOUS SKIN TESTING      1/3/2024     3:00 PM   [employer]    Peanut 1:20 (W/F in millimeters) 0   [place]  1:20 (W/F in millimeters) 0   Cashew  1:20 (W/F in millimeters) 0   Pecan  1:20 (W/F in millimeters) 0   Pistachio*ALK (1:10 w/v) 0   [place] 1:20 (W/F in millimeters) 0   Hazelnut (Filbert)  1:20 (W/F in millimeters) 0   Brazil Nut  1:20 (W/F in millimeters) 0      My interpretation: Percutaneous skin puncture testing for aeroallergens, peanut, and tree nuts, performed today was negative with appropriate responses to positive and negative controls.     ASSESSMENT/PLAN:    Chronic rhinitis  Recurrent sinus infections    Negative percutaneous skin puncture testing for aeroallergens suggesting a lack of sensitivity to tested environmental/seasonal allergens.  Unable to recommend avoidance measures or allergen immunotherapy.   -Use azelastine 2 sprays in each nostril twice a day when necessary.  - Add intranasal fluticasone if nasal symptoms are still persistent.   If she develops a sinus infection symptoms, recommend getting sinus CT first.  I ordered it, with them expiration date in 1 year.    - azelastine (ASTELIN) 0.1 % nasal spray  Dispense: 30 mL; Refill: 3  - ALLERGY SKIN TESTS,ALLERGENS  - CT Sinus w/o Contrast      Reaction to food, initial encounter    Negative skin test for peanut and tree nuts, which is reassuring.  She may develop itchy mouth  even when she does not eat anything.  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  Variant of burning mouth syndrome?  - She will continue monitoring symptoms.  Depending on symptom control, she may need to see ENT for that.    - ALLERGY SKIN TESTS,ALLERGENS     Follow up in 10 weeks  or sooner if needed.     Thank you for allowing us to participate in the care of this patient. Please feel free to contact us if there are any questions or concerns about the patient.    Disclaimer: This note consists of symbols derived from keyboarding, dictation and/or voice recognition software. As a result, there may be errors in the script that have gone undetected. Please consider this when interpreting information found in this chart.    Zackary Mcintosh MD, FAAAAI, FACAAI  Allergy and Asthma     [facility]

## 2024-01-03 NOTE — PATIENT INSTRUCTIONS
-Use azelastine 2 sprays in each nostril twice a day when necessary.    Add intranasal fluticasone if nasal symptoms are still persistent.     If you feel you have a sinus infection it could be worthwhile getting a sinus CT, to see what your sinuses look like. I put the orser in.       Skin test for peanuts and tree nuts was negative.      Dr Mcintosh Scheduling:  Allergy Appointment line: [phone]    All visits for food challenges, venom allergy testing, and medication/drug challenges MUST be scheduled through the allergy clinic nurse. Please send a [facility] message or call the allergy scheduling line and ask to speak with Dr Mcintosh's team for scheduling these appointments. Appointments for these visits that are made through the schedulers or via [facility] may be cancelled or rescheduled.    Allergy Shot Room [facility]): [phone]    Pulmonary Function Scheduling:  [place] - [phone]  [facility] [phone]  [place] - [phone]     Prescription Assistance  If you need assistance with your prescriptions (cost, coverage, etc) please contact: [place] [facility] [phone]

## 2024-01-03 NOTE — LETTER
1/3/2024         RE: Lizzie Oconnell  [id number] [facility] [address]        Dear Colleague,    Thank you for referring your patient, Lizzie Oconnell, to the [facility]. Please see a copy of my visit note below.    SUBJECTIVE:                                                                 Lizzie Oconnell presents today to our Allergy Clinic at [facility] for a new patient visit. She is a 33 year old female with concerns about possible environmental/seasonal allergies.      Question 1/3/2024  6:18 AM CST - Filed by Patient   Have you had close contact with someone who has traveled outside the country in the past 30 days and is sick? No     [facility]    Question 1/3/2024  6:24 AM CST - Filed by Patient   Reason for visit: Nasal or sinus symptoms   Nasal/ Sinus/ Eye Symptoms    When did your symptoms begin? As long as i can remember   What symptoms do you have? Itchy eyes    Watery eyes    Stuffy nose    Sinus pressure    Drainage down the throat    Ear pressure    Headaches   Any prior sinus surgeries? No   History of nasal polyps? Yes, was told so by primary, but ENT said no polyps   History of sinus infections? Yes   How many in the past year? Several, lost track   Have you tried pills/oral medications? Yes   Which ones? Psudaphed and antibiotics   Have you tried nasal sprays? Yes   Which ones? Flonase and other over the counter sprays. Partially helpful.   Have you used eye drops? Yes   Which ones?    When do symptoms occur? Year-round, worse with changes of seasons    Indoors    Outdoors   What makes symptoms worse? Animals    Dust    Temperature changes    Scents/perfumes   Environmental and Social History    Place of Residence: House   Do you have Central Air Conditioning? 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 No   Does anyone living in your home smoke or vape? No   Family History    Do your parents, siblings, or children have asthma? Yes   Who? 2 of 5 children diagnosed so far   Do your parents, siblings, or children have environmental allergies? Yes   Who? 3 of 5 children disahnosed so far   Do your parents, siblings, or children have food allergies? No   Do your parents, siblings, or children have eczema? Yes   Who? 3 of 5 children diagnosed so far       When she develops rhinoconjunctivitis, she also develops itchy mouth. Oral antihistamines are partially helpful. Oral antihistamines in the past were somewhat helpful. She has not taken antihistamines in the past 7 days in anticipation of allergy testing.   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 In the past, when she ate peanuts or tree nuts, she would develop an itchy/burning tongue. It could last from minutes, up to hours, up to a whole day. She avoids peanuts and tree nuts for that reason. On the other hand, she also develops an itchy mouth when she doesn't eat anything.        Patient Active Problem List   Diagnosis     Pain in joint, multiple sites     TYREL (generalized anxiety disorder)     Mild major depression (H)     Panic attack     History of prior pregnancy with SGA      Migraines     Dysmenorrhea     Tetanus, diphtheria, and acellular pertussis (Tdap) vaccination declined     Moderate episode of recurrent major depressive disorder (H)       Past Medical History:   Diagnosis Date     Anxiety 2018     Depressive disorder      Pregnancy       Problem (# of Occurrences) Relation (Name,Age of Onset)    Anxiety Disorder (2) Mother, Brother    Depression (3) Mother, Brother, Brother (Brother)          Past Surgical History:   Procedure Laterality Date      SECTION  2021     CHOLECYSTECTOMY       GALLBLADDER SURGERY       Social History     Socioeconomic History     Marital status:      Spouse name: None     Number of  children: None     Years of education: None     Highest education level: None   Tobacco Use     Smoking status: Never     Smokeless tobacco: Never   Vaping Use     Vaping Use: Never used   Substance and Sexual Activity     Alcohol use: No     Drug use: No     Sexual activity: Yes     Partners: Male     Birth control/protection: None     Comment: pregnant   Other Topics Concern     Parent/sibling w/ CABG, MI or angioplasty before 65F 55M? No   Social History Narrative    ENVIRONMENTAL HISTORY: [place] The home is heated with a forced air. They does have central air conditioning. The patient's bedroom is furnished with carpeting in bedroom.  Pets inside the house include 0. There is no history of cockroach or mice infestation. There is/are 0 smokers in the house.  The house does have a damp basement.             Review of Systems   Constitutional:  Negative for activity change, chills, fatigue and fever.   HENT:  Positive for congestion, postnasal drip and sinus pressure. Negative for nosebleeds, rhinorrhea, sneezing and sore throat.    Eyes:  Positive for itching. Negative for discharge and redness.   Respiratory:  Positive for cough. Negative for chest tightness, shortness of breath and wheezing.    Cardiovascular:  Negative for chest pain.   Gastrointestinal:  Negative for abdominal pain, constipation, diarrhea, nausea and vomiting.   Musculoskeletal:  Negative for joint swelling.   Skin:  Negative for rash.   Neurological:  Negative for dizziness, light-headedness and headaches.           Current Outpatient Medications:      azelastine (ASTELIN) 0.1 % nasal spray, Spray 2 sprays into both nostrils 2 times daily as needed for rhinitis, Disp: 30 mL, Rfl: 3     ALPRAZolam (XANAX) 0.25 MG tablet, Take 1 tablet (0.25 mg) by mouth 3 times daily as needed for anxiety, Disp: 10 tablet, Rfl: 0     DULoxetine (CYMBALTA) 20 MG capsule, TAKE 1 CAPSULE BY MOUTH EVERY DAY, Disp: 30  capsule, Rfl: 1     fluticasone (FLONASE) 50 MCG/ACT nasal spray, Spray 1-2 sprays into both nostrils daily, Disp: 16 g, Rfl: 3     methylfolate (DEPLIN) 15 MG TABS tablet, Take 7.5 mg by mouth daily, Disp: , Rfl:   Immunization History   Administered Date(s) Administered     HIB (PRP-T) 1992, 1992     HepB 08/06/2004     Historical DTP/aP 1992, 1992, 02/12/1999     MMR 09/02/1993, 10/11/2001     OPV, trivalent, live 1992, 1992     Poliovirus, inactivated (IPV) 10/11/2001, 12/13/2001     TD,PF 7+ (Tenivac) 02/12/1999     Varicella 08/28/2001     Allergies   Allergen Reactions     Aminothiols      Amoxicillin Difficulty breathing     Compazine [Prochlorperazine]      Pcn [Penicillins]      OBJECTIVE:                                                                 /87   Pulse 95   Resp 16   Wt 63.5 kg (140 lb)   SpO2 95%   BMI 24.99 kg/m          Physical Exam  Vitals and nursing note reviewed.   Constitutional:       General: She is not in acute distress.     Appearance: She is not ill-appearing, toxic-appearing or diaphoretic.   HENT:      Head: Normocephalic and atraumatic.      Right Ear: Tympanic membrane, ear canal and external ear normal.      Left Ear: Tympanic membrane, ear canal and external ear normal.      Nose: No mucosal edema, congestion or rhinorrhea.      Right Turbinates: Not enlarged, swollen or pale.      Left Turbinates: Not enlarged, swollen or pale.      Mouth/Throat:      Lips: Pink.      Mouth: Mucous membranes are moist.      Pharynx: Oropharynx is clear. No pharyngeal swelling, oropharyngeal exudate, posterior oropharyngeal erythema or uvula swelling.   Eyes:      General:         Right eye: No discharge.         Left eye: No discharge.      Conjunctiva/sclera: Conjunctivae normal.   Cardiovascular:      Rate and Rhythm: Normal rate and regular rhythm.      Heart sounds: Normal heart sounds. No murmur heard.  Pulmonary:      Effort: Pulmonary  effort is normal. No respiratory distress.      Breath sounds: Normal breath sounds and air entry. No stridor, decreased air movement or transmitted upper airway sounds. No decreased breath sounds, wheezing, rhonchi or rales.   Musculoskeletal:         General: Normal range of motion.   Neurological:      Mental Status: She is alert and oriented to person, place, and time.   Psychiatric:         Mood and Affect: Mood normal.         Behavior: Behavior normal.         At today's visit, the patient and I engaged in an informed consent discussion about allergy testing.  We discussed skin testing, blood testing, and the alternative of not undergoing any testing. The patient has a preference for skin testing. We then discussed the risks and benefits of skin testing.  The patient understands skin testing risks can include, but are not limited to, urticaria, angioedema, shortness of breath, and severe anaphylaxis.  The benefits include, but are not limited, to evaluation for allergens causing symptoms.  After answering the patient's questions they have agreed to proceed with skin testing.    ENVIRONMENTAL PERCUTANEOUS SKIN TESTING: ADULT      1/3/2024     2:00 PM   [facility]   Consent ARVIND   Ordering Physician Dayna   Interpreting Physician Dayna   Testing Technician Dona   Location Back   Time start: 15:00   Time End: 15:15   Positive Control: Histatrol*ALK 1 mg/ml 5/5   Negative Control: 50% Glycerin 0   Cat Hair*ALK (10,000 BAU/ml) 0   AP Dog Hair/Dander (1:100 w/v) 0   Dust Mite p. 30,000 AU/ml 0   Dust Mite f. (30,000 AU/ml) 0   Julio (W/F in millimeters) 0   Jeffrey Grass (100,000 BAU/mL) 0   Red Cedar (W/F in millimeters) 0   Maple/Brookings (W/F in millimeters) 0   Hackberry (W/F in millimeters) 0   Benzie (W/F in millimeters) 0   Ross *ALK (W/F in millimeters) 0   American Elm (W/F in millimeters) 0   [place] (W/F in millimeters) 0   Black [place] (W/F in millimeters) 0   Birch Mix (W/F in  millimeters) 0   [place] (W/F in millimeters) 0   Oak (W/F in millimeters) 0   Cocklebur (W/F in millimeters) 0   [place] (W/F in millimeters) 0   White Arm[place] (W/F in millimeters) 0   Careless (W/F in millimeters) 0   Nettle (W/F in millimeters) 0   English Plantain (W/F in millimeters) 0   Kochia (W/F in millimeters) 0   Lamb's Quarter (W/F in millimeters) 0   Marshelder (W/F in millimeters) 0   Ragweed Mix* ALK (W/F in millimeters) 0   Russian Thistle (W/F in millimeters) 0   Sagebrush/Mugwort (W/F in millimeters) 0   Sheep Sorrel (W/F in millimeters) 0   Feather Mix* ALK (W/F in millimeters) 0   Penicillium Mix (1:10 w/v) 0   Curvularia spicifera (1:10 w/v) 0   Epicoccum (1:10 w/v) 0   Aspergillus fumigatus (1:10 w/v): 0   Alternaria tenius (1:10 w/v) 0   H. Cladosporium (1:10 w/v) 0   Phoma herbarum (1:10 w/v) 0      FOOD ALLERGEN PERCUTANEOUS SKIN TESTING      1/3/2024     3:00 PM   [employer]    Peanut 1:20 (W/F in millimeters) 0   [place]  1:20 (W/F in millimeters) 0   Cashew  1:20 (W/F in millimeters) 0   Pecan  1:20 (W/F in millimeters) 0   Pistachio*ALK (1:10 w/v) 0   B[place] 1:20 (W/F in millimeters) 0   Hazelnut (Filbert)  1:20 (W/F in millimeters) 0   Brazil Nut  1:20 (W/F in millimeters) 0      My interpretation: Percutaneous skin puncture testing for aeroallergens, peanut, and tree nuts, performed today was negative with appropriate responses to positive and negative controls.     ASSESSMENT/PLAN:    Chronic rhinitis  Recurrent sinus infections    Negative percutaneous skin puncture testing for aeroallergens suggesting a lack of sensitivity to tested environmental/seasonal allergens.  Unable to recommend avoidance measures or allergen immunotherapy.   -Use azelastine 2 sprays in each nostril twice a day when necessary.  - Add intranasal fluticasone if nasal symptoms are still persistent.   If she develops a sinus infection symptoms, recommend getting sinus CT first.  I ordered it, with them expiration  date in 1 year.    - azelastine (ASTELIN) 0.1 % nasal spray  Dispense: 30 mL; Refill: 3  - ALLERGY SKIN TESTS,ALLERGENS  - CT Sinus w/o Contrast      Reaction to food, initial encounter    Negative skin test for peanut and tree nuts, which is reassuring.  She may develop itchy mouth even when she does not eat anything.  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  Variant of burning mouth syndrome?  - She will continue monitoring symptoms.  Depending on symptom control, she may need to see ENT for that.    - ALLERGY SKIN TESTS,ALLERGENS     Follow up in 10 weeks  or sooner if needed.     Thank you for allowing us to participate in the care of this patient. Please feel free to contact us if there are any questions or concerns about the patient.    Disclaimer: This note consists of symbols derived from keyboarding, dictation and/or voice recognition software. As a result, there may be errors in the script that have gone undetected. Please consider this when interpreting information found in this chart.    Zackary Mcintosh MD, FAAAAI, FACAAI  Allergy and Asthma     [facility]        Again, thank you for allowing me to participate in the care of your patient.        Sincerely,        Zackary Mcintosh MD

## 2024-11-02 ENCOUNTER — HEALTH MAINTENANCE LETTER (OUTPATIENT)
Age: 34
End: 2024-11-02